# Patient Record
Sex: MALE | Race: WHITE | NOT HISPANIC OR LATINO | Employment: OTHER | URBAN - NONMETROPOLITAN AREA
[De-identification: names, ages, dates, MRNs, and addresses within clinical notes are randomized per-mention and may not be internally consistent; named-entity substitution may affect disease eponyms.]

---

## 2017-01-30 RX ORDER — ESOMEPRAZOLE MAGNESIUM 40 MG/1
CAPSULE, DELAYED RELEASE ORAL
Qty: 90 CAPSULE | Refills: 1 | Status: SHIPPED | OUTPATIENT
Start: 2017-01-30 | End: 2017-06-22 | Stop reason: ALTCHOICE

## 2017-02-13 RX ORDER — FUROSEMIDE 20 MG/1
TABLET ORAL
Qty: 30 TABLET | Refills: 5 | Status: SHIPPED | OUTPATIENT
Start: 2017-02-13 | End: 2017-06-14 | Stop reason: SDUPTHER

## 2017-03-24 ENCOUNTER — APPOINTMENT (OUTPATIENT)
Dept: GENERAL RADIOLOGY | Facility: HOSPITAL | Age: 77
End: 2017-03-24

## 2017-03-24 ENCOUNTER — HOSPITAL ENCOUNTER (EMERGENCY)
Facility: HOSPITAL | Age: 77
Discharge: HOME | End: 2017-03-24
Attending: EMERGENCY MEDICINE | Admitting: EMERGENCY MEDICINE

## 2017-03-24 VITALS
BODY MASS INDEX: 34.84 KG/M2 | OXYGEN SATURATION: 97 % | WEIGHT: 222 LBS | SYSTOLIC BLOOD PRESSURE: 136 MMHG | TEMPERATURE: 97.9 F | DIASTOLIC BLOOD PRESSURE: 77 MMHG | HEART RATE: 56 BPM | HEIGHT: 67 IN | RESPIRATION RATE: 18 BRPM

## 2017-03-24 DIAGNOSIS — R07.9 CHEST PAIN, UNSPECIFIED TYPE: Primary | ICD-10-CM

## 2017-03-24 LAB
ALBUMIN SERPL-MCNC: 3.9 G/DL (ref 3.5–5)
ALBUMIN/GLOB SERPL: 1.2 G/DL (ref 1–2)
ALP SERPL-CCNC: 67 U/L (ref 38–126)
ALT SERPL W P-5'-P-CCNC: 34 U/L (ref 13–69)
ANION GAP SERPL CALCULATED.3IONS-SCNC: 13.3 MMOL/L
AST SERPL-CCNC: 29 U/L (ref 15–46)
BASOPHILS # BLD AUTO: 0.06 10*3/MM3 (ref 0–0.2)
BASOPHILS NFR BLD AUTO: 0.7 % (ref 0–2.5)
BILIRUB SERPL-MCNC: 0.5 MG/DL (ref 0.2–1.3)
BUN BLD-MCNC: 28 MG/DL (ref 7–20)
BUN/CREAT SERPL: 21.5 (ref 6.3–21.9)
CALCIUM SPEC-SCNC: 9.4 MG/DL (ref 8.4–10.2)
CHLORIDE SERPL-SCNC: 105 MMOL/L (ref 98–107)
CO2 SERPL-SCNC: 29 MMOL/L (ref 26–30)
CREAT BLD-MCNC: 1.3 MG/DL (ref 0.6–1.3)
DEPRECATED RDW RBC AUTO: 46 FL (ref 37–54)
EOSINOPHIL # BLD AUTO: 0.28 10*3/MM3 (ref 0–0.7)
EOSINOPHIL NFR BLD AUTO: 3.4 % (ref 0–7)
ERYTHROCYTE [DISTWIDTH] IN BLOOD BY AUTOMATED COUNT: 13.9 % (ref 11.5–14.5)
GFR SERPL CREATININE-BSD FRML MDRD: 54 ML/MIN/1.73
GLOBULIN UR ELPH-MCNC: 3.3 GM/DL
GLUCOSE BLD-MCNC: 149 MG/DL (ref 74–98)
HCT VFR BLD AUTO: 42.7 % (ref 42–52)
HGB BLD-MCNC: 14.7 G/DL (ref 14–18)
HOLD SPECIMEN: NORMAL
HOLD SPECIMEN: NORMAL
IMM GRANULOCYTES # BLD: 0.03 10*3/MM3 (ref 0–0.06)
IMM GRANULOCYTES NFR BLD: 0.4 % (ref 0–0.6)
LYMPHOCYTES # BLD AUTO: 2.74 10*3/MM3 (ref 0.6–3.4)
LYMPHOCYTES NFR BLD AUTO: 33.3 % (ref 10–50)
MCH RBC QN AUTO: 31 PG (ref 27–31)
MCHC RBC AUTO-ENTMCNC: 34.4 G/DL (ref 30–37)
MCV RBC AUTO: 90.1 FL (ref 80–94)
MONOCYTES # BLD AUTO: 0.83 10*3/MM3 (ref 0–0.9)
MONOCYTES NFR BLD AUTO: 10.1 % (ref 0–12)
NEUTROPHILS # BLD AUTO: 4.3 10*3/MM3 (ref 2–6.9)
NEUTROPHILS NFR BLD AUTO: 52.1 % (ref 37–80)
NRBC BLD MANUAL-RTO: 0 /100 WBC (ref 0–0)
PLATELET # BLD AUTO: 140 10*3/MM3 (ref 130–400)
PMV BLD AUTO: 10.1 FL (ref 6–12)
POTASSIUM BLD-SCNC: 3.3 MMOL/L (ref 3.5–5.1)
PROT SERPL-MCNC: 7.2 G/DL (ref 6.3–8.2)
RBC # BLD AUTO: 4.74 10*6/MM3 (ref 4.7–6.1)
SODIUM BLD-SCNC: 144 MMOL/L (ref 137–145)
TROPONIN I SERPL-MCNC: 0.01 NG/ML (ref 0–0.05)
TROPONIN I SERPL-MCNC: <0.012 NG/ML (ref 0–0.03)
TROPONIN I SERPL-MCNC: <0.012 NG/ML (ref 0–0.03)
WBC NRBC COR # BLD: 8.24 10*3/MM3 (ref 4.8–10.8)
WHOLE BLOOD HOLD SPECIMEN: NORMAL
WHOLE BLOOD HOLD SPECIMEN: NORMAL

## 2017-03-24 PROCEDURE — 99284 EMERGENCY DEPT VISIT MOD MDM: CPT

## 2017-03-24 PROCEDURE — 85025 COMPLETE CBC W/AUTO DIFF WBC: CPT | Performed by: EMERGENCY MEDICINE

## 2017-03-24 PROCEDURE — 71010 HC CHEST PA OR AP: CPT

## 2017-03-24 PROCEDURE — 84484 ASSAY OF TROPONIN QUANT: CPT | Performed by: EMERGENCY MEDICINE

## 2017-03-24 PROCEDURE — 93005 ELECTROCARDIOGRAM TRACING: CPT

## 2017-03-24 PROCEDURE — 80053 COMPREHEN METABOLIC PANEL: CPT | Performed by: EMERGENCY MEDICINE

## 2017-03-24 RX ORDER — ASPIRIN 81 MG/1
243 TABLET, CHEWABLE ORAL ONCE
Status: COMPLETED | OUTPATIENT
Start: 2017-03-24 | End: 2017-03-24

## 2017-03-24 RX ORDER — SODIUM CHLORIDE 0.9 % (FLUSH) 0.9 %
10 SYRINGE (ML) INJECTION AS NEEDED
Status: DISCONTINUED | OUTPATIENT
Start: 2017-03-24 | End: 2017-03-24 | Stop reason: HOSPADM

## 2017-03-24 RX ADMIN — ASPIRIN 81 MG 243 MG: 81 TABLET ORAL at 15:49

## 2017-03-24 NOTE — ED PROVIDER NOTES
TRIAGE CHIEF COMPLAINT:   Chief Complaint   Patient presents with   • Chest Pain      HPI: Gabriel Conley is a 76 y.o. male who presents to the emergency department complaining of chest discomfort.  Patient describes aching midsternal chest discomfort that radiated to his left shoulder and hand.  Symptoms began yesterday and it been intermittently present.  Patient denies any discomfort on arrival.  Denies any associated shortness of breath.  No lightheadedness or dizziness.  No nausea or vomiting or diaphoresis.  Patient is a history of coronary artery disease and has had bypass surgery in the past.  Denies any cough or fevers or chills.  No other complaints currently.     REVIEW OF SYSTEMS: Otherwise negative unless stated above     PAST MEDICAL HISTORY:   Past Medical History:   Diagnosis Date   • Acid reflux    • Heart murmur    • Kidney stone    • Melena 9/9/2016        FAMILY HISTORY:   Family History   Problem Relation Age of Onset   • Stroke Other    • Hypertension Other    • Diabetes Other    • Arthritis Other    • Heart attack Other         SOCIAL HISTORY:   Social History     Social History   • Marital status:      Spouse name: N/A   • Number of children: N/A   • Years of education: N/A     Occupational History   • Not on file.     Social History Main Topics   • Smoking status: Never Smoker   • Smokeless tobacco: Not on file   • Alcohol use No   • Drug use: Not on file   • Sexual activity: Not on file     Other Topics Concern   • Not on file     Social History Narrative   • No narrative on file        SURGICAL HISTORY:   Past Surgical History:   Procedure Laterality Date   • CARDIAC SURGERY     • CORONARY ARTERY BYPASS GRAFT       Coronary Artery Triple Arterial Bypass Graft   • HERNIA REPAIR          CURRENT MEDICATIONS:      Medication List      ASK your doctor about these medications          aspirin 81 MG tablet       atorvastatin 20 MG tablet   Commonly known as:  LIPITOR       Coenzyme Q10 100 MG  tablet       * esomeprazole 40 MG packet   Commonly known as:  NexIUM   Take 40 mg by mouth every morning before breakfast.       * esomeprazole 40 MG capsule   Commonly known as:  nexIUM   TAKE 1 CAPSULE EVERY DAY       furosemide 20 MG tablet   Commonly known as:  LASIX   TAKE 1 TABLET DAILY AS NEEDED.       melatonin 5 MG tablet tablet       metoprolol tartrate 50 MG tablet   Commonly known as:  LOPRESSOR       MICARDIS 20 MG tablet   Generic drug:  telmisartan       NITROSTAT 0.4 MG SL tablet   Generic drug:  nitroglycerin       * LUTEIN VISION BLEND PO       * OSTEO BI-FLEX ADV DOUBLE ST PO       potassium chloride 20 MEQ CR tablet   Commonly known as:  K-DUR,KLOR-CON       telmisartan-hydrochlorothiazide 80-25 MG per tablet   Commonly known as:  MICARDIS HCT       * Notice:  This list has 4 medication(s) that are the same as other   medications prescribed for you. Read the directions carefully, and ask   your doctor or other care provider to review them with you.         ALLERGIES: Vicodin [hydrocodone-acetaminophen]     PHYSICAL EXAM:   VITAL SIGNS:   Vitals:    03/24/17 1732   BP: 126/76   Pulse: 57   Resp:    Temp:    SpO2: 94%      CONSTITUTIONAL: Awake, oriented, appears non-toxic   HENT: Atraumatic, normocephalic, oral mucosa pink and moist, airway patent.  EYES: Conjunctiva clear  NECK: Trachea midline, non-tender, supple   CARDIOVASCULAR: Normal heart rate, Normal rhythm, No murmurs, rubs, gallops   PULMONARY/CHEST: Clear to auscultation, no rhonchi, wheezes, or rales. Symmetrical breath sounds.   ABDOMINAL: Non-distended, soft, non-tender - no rebound or guarding.  NEUROLOGIC: Non-focal, moving all four extremities, no gross sensory or motor deficits.   EXTREMITIES: No clubbing, cyanosis, or edema   SKIN: Warm, Dry, No erythema, No rash     ED COURSE / MEDICAL DECISION MAKING:   Gabriel Conley is a 76 y.o. male who presents to the emergency department for evaluation of chest discomfort.  Patient currently  asymptomatic on arrival.  Vital signs stable on arrival.  Physical exam is unremarkable.  EKG on arrival revealed sinus rhythm with a rate of 67 bpm.  There were some nonspecific findings but no acute ischemic changes.  EKG was similar in appearance to previous EKGs.  Laboratory testing was largely unremarkable.  Chest x-ray per my interpretation did not reveal any acute abnormalities.  Patient remained asymptomatic in the emergency department.  Repeat set of cardiac enzymes was obtained and did not reveal any acute abnormalities, within normal limits.  Repeat EKG revealed sinus bradycardia with rate of 57 bpm.  It was similar in appearance to previous EKG.  Patient remained asymptomatic after several hours.  At this point I think is safe for discharged home.  Will refer him back to his cardiologist and have him return should his symptoms worsen in any way.    DECISION TO DISCHARGE/ADMIT: see ED care timeline     FINAL IMPRESSION:   1 -- chest pain   2 --   3 --     Electronically signed by: Apryl Rea MD, 3/24/2017 6:37 PM       Apryl Rea MD  03/24/17 1954

## 2017-03-24 NOTE — DISCHARGE INSTRUCTIONS
"Most recent vital signs: /61  Pulse 59  Temp 98.2 °F (36.8 °C) (Oral)   Resp 18  Ht 66.5\" (168.9 cm)  Wt 222 lb (101 kg)  SpO2 96%  BMI 35.29 kg/m2     Below you fill find any summaries of imaging results and further discharge instructions as discussed during your visit.     XR Chest 1 View    (Results Pending)        --PLEASE READ THESE DIRECTIONS IN FULL--     Our goal is to provide the best care we can AND to find any medical or surgical emergency while you are in the ER. If a medical or surgical emergency was not identified during your visit (or if the issue was resolved during the visit), following these directions for follow-up with a primary care provider and/or specialists and following the return precautions will be the safest and most effective way to protect your health after leaving the emergency room.     Therefore, in addition to the conversation we had in the room, please read all of the information in these discharge instructions, take medications as directed, and follow-up as directed.     You should seek immediate medical help for:     Worsening pain that is not helped with prescribed pain medicines and/or the recommended doses of over the counter pain medicines such as acetaminophen (Tylenol) or ibuprofen (Motrin/Advil)*.   New or worsening chest pain or trouble breathing   New or worsening headache, confusion, weakness, or visual changes   New or worsening fever (>100.4 F) that does not improve with the recommended doses of over the counter fever treatments such as acetaminophen (Tylenol) or ibuprofen (Motrin/Advil)* for more than a few hours.   Any other concerns that you feel could be life, limb, or eye-sight threatening     As a reminder, if you received any medicines or prescriptions for medicines that may be sedating (\"narcotics\", \"opioids\"), do NOT:   - operate any vehicles   - take if you are already tired   - take if you have had or plan to have alcohol   - perform other " responsibilities that require your full attention.     Common medications include, but are not limited to:   Opiates: Morphine, Norco, Lortab, Vicodin, Percocet, oxycodone, hydrocodone, Dilaudid, hydromorphone   Benzodiazepines: Ativan, Valium, alprazolam, lorazepam, diazepam,   Other sedating medications: promethazine, Phenergan, trazodone, Benadryl, hydroxyzine, Vistaril, diphenhydramine, zolpidem, and Ambien     *If you have any history of GI (stomach/intestinal) bleeding, allergic reactions, kidney problems, and/or certain heart problems or there is any chance you could be pregnant, and have been told to avoid NSAIDs (ibuprofen, naproxen, Aleve, Motrin, Advil) please avoid these medications. Please remember that prescribed pain medicines often contain Tylenol/acetaminophen, so make sure your total daily (24 hour) intake does not exceed 4 grams or 4000mg.

## 2017-06-14 ENCOUNTER — TELEPHONE (OUTPATIENT)
Dept: INTERNAL MEDICINE | Facility: CLINIC | Age: 77
End: 2017-06-14

## 2017-06-14 RX ORDER — FUROSEMIDE 20 MG/1
20 TABLET ORAL DAILY
Qty: 30 TABLET | Refills: 0 | Status: SHIPPED | OUTPATIENT
Start: 2017-06-14 | End: 2017-08-28 | Stop reason: SDUPTHER

## 2017-06-14 NOTE — TELEPHONE ENCOUNTER
PT SAID HE WANTS TO DISCONTINUE ESOMEPRAZOLE MAG 40MG. HE SAID PEPTO BISMOL IS HELPING HIS STOMACH PROBLEM.

## 2017-06-29 ENCOUNTER — HOSPITAL ENCOUNTER (EMERGENCY)
Facility: HOSPITAL | Age: 77
Discharge: ANOTHER HOSPITAL | End: 2017-06-30
Attending: EMERGENCY MEDICINE | Admitting: EMERGENCY MEDICINE

## 2017-06-29 ENCOUNTER — APPOINTMENT (OUTPATIENT)
Dept: GENERAL RADIOLOGY | Facility: HOSPITAL | Age: 77
End: 2017-06-29

## 2017-06-29 VITALS
TEMPERATURE: 97.7 F | RESPIRATION RATE: 18 BRPM | OXYGEN SATURATION: 95 % | DIASTOLIC BLOOD PRESSURE: 68 MMHG | WEIGHT: 221 LBS | BODY MASS INDEX: 34.69 KG/M2 | SYSTOLIC BLOOD PRESSURE: 118 MMHG | HEART RATE: 59 BPM | HEIGHT: 67 IN

## 2017-06-29 DIAGNOSIS — I48.0 PAROXYSMAL ATRIAL FIBRILLATION (HCC): ICD-10-CM

## 2017-06-29 DIAGNOSIS — I21.4 ACUTE NON-ST SEGMENT ELEVATION MYOCARDIAL INFARCTION (HCC): Primary | ICD-10-CM

## 2017-06-29 LAB
ALBUMIN SERPL-MCNC: 4.2 G/DL (ref 3.5–5)
ALBUMIN/GLOB SERPL: 1.3 G/DL (ref 1–2)
ALP SERPL-CCNC: 69 U/L (ref 38–126)
ALT SERPL W P-5'-P-CCNC: 34 U/L (ref 13–69)
ANION GAP SERPL CALCULATED.3IONS-SCNC: 16.2 MMOL/L
AST SERPL-CCNC: 25 U/L (ref 15–46)
BASOPHILS # BLD AUTO: 0.04 10*3/MM3 (ref 0–0.2)
BASOPHILS NFR BLD AUTO: 0.3 % (ref 0–2.5)
BILIRUB SERPL-MCNC: 0.6 MG/DL (ref 0.2–1.3)
BUN BLD-MCNC: 28 MG/DL (ref 7–20)
BUN/CREAT SERPL: 21.5 (ref 6.3–21.9)
CALCIUM SPEC-SCNC: 9.8 MG/DL (ref 8.4–10.2)
CHLORIDE SERPL-SCNC: 102 MMOL/L (ref 98–107)
CO2 SERPL-SCNC: 27 MMOL/L (ref 26–30)
CREAT BLD-MCNC: 1.3 MG/DL (ref 0.6–1.3)
DEPRECATED RDW RBC AUTO: 45.1 FL (ref 37–54)
EOSINOPHIL # BLD AUTO: 0.13 10*3/MM3 (ref 0–0.7)
EOSINOPHIL NFR BLD AUTO: 1.1 % (ref 0–7)
ERYTHROCYTE [DISTWIDTH] IN BLOOD BY AUTOMATED COUNT: 14 % (ref 11.5–14.5)
GFR SERPL CREATININE-BSD FRML MDRD: 54 ML/MIN/1.73
GLOBULIN UR ELPH-MCNC: 3.3 GM/DL
GLUCOSE BLD-MCNC: 191 MG/DL (ref 74–98)
HCT VFR BLD AUTO: 43.6 % (ref 42–52)
HGB BLD-MCNC: 15 G/DL (ref 14–18)
HOLD SPECIMEN: NORMAL
IMM GRANULOCYTES # BLD: 0.05 10*3/MM3 (ref 0–0.06)
IMM GRANULOCYTES NFR BLD: 0.4 % (ref 0–0.6)
LYMPHOCYTES # BLD AUTO: 2.85 10*3/MM3 (ref 0.6–3.4)
LYMPHOCYTES NFR BLD AUTO: 24.3 % (ref 10–50)
MCH RBC QN AUTO: 30.5 PG (ref 27–31)
MCHC RBC AUTO-ENTMCNC: 34.4 G/DL (ref 30–37)
MCV RBC AUTO: 88.8 FL (ref 80–94)
MONOCYTES # BLD AUTO: 0.72 10*3/MM3 (ref 0–0.9)
MONOCYTES NFR BLD AUTO: 6.1 % (ref 0–12)
NEUTROPHILS # BLD AUTO: 7.92 10*3/MM3 (ref 2–6.9)
NEUTROPHILS NFR BLD AUTO: 67.8 % (ref 37–80)
NRBC BLD MANUAL-RTO: 0 /100 WBC (ref 0–0)
PLATELET # BLD AUTO: 132 10*3/MM3 (ref 130–400)
PMV BLD AUTO: 10.1 FL (ref 6–12)
POTASSIUM BLD-SCNC: 3.2 MMOL/L (ref 3.5–5.1)
PROT SERPL-MCNC: 7.5 G/DL (ref 6.3–8.2)
RBC # BLD AUTO: 4.91 10*6/MM3 (ref 4.7–6.1)
SODIUM BLD-SCNC: 142 MMOL/L (ref 137–145)
TROPONIN I SERPL-MCNC: 0.01 NG/ML (ref 0–0.05)
TROPONIN I SERPL-MCNC: 0.29 NG/ML (ref 0–0.03)
TROPONIN I SERPL-MCNC: <0.012 NG/ML (ref 0–0.03)
TSH SERPL DL<=0.05 MIU/L-ACNC: 1.89 MIU/ML (ref 0.47–4.68)
WBC NRBC COR # BLD: 11.71 10*3/MM3 (ref 4.8–10.8)
WHOLE BLOOD HOLD SPECIMEN: NORMAL
WHOLE BLOOD HOLD SPECIMEN: NORMAL

## 2017-06-29 PROCEDURE — 71010 HC CHEST PA OR AP: CPT

## 2017-06-29 PROCEDURE — 80053 COMPREHEN METABOLIC PANEL: CPT | Performed by: EMERGENCY MEDICINE

## 2017-06-29 PROCEDURE — 84443 ASSAY THYROID STIM HORMONE: CPT | Performed by: PHYSICIAN ASSISTANT

## 2017-06-29 PROCEDURE — 99284 EMERGENCY DEPT VISIT MOD MDM: CPT

## 2017-06-29 PROCEDURE — 84484 ASSAY OF TROPONIN QUANT: CPT | Performed by: PHYSICIAN ASSISTANT

## 2017-06-29 PROCEDURE — 25010000002 ENOXAPARIN PER 10 MG: Performed by: PHYSICIAN ASSISTANT

## 2017-06-29 PROCEDURE — 93005 ELECTROCARDIOGRAM TRACING: CPT | Performed by: EMERGENCY MEDICINE

## 2017-06-29 PROCEDURE — 96374 THER/PROPH/DIAG INJ IV PUSH: CPT

## 2017-06-29 PROCEDURE — 85025 COMPLETE CBC W/AUTO DIFF WBC: CPT | Performed by: EMERGENCY MEDICINE

## 2017-06-29 PROCEDURE — 96372 THER/PROPH/DIAG INJ SC/IM: CPT

## 2017-06-29 PROCEDURE — 84484 ASSAY OF TROPONIN QUANT: CPT | Performed by: EMERGENCY MEDICINE

## 2017-06-29 RX ORDER — ASPIRIN 81 MG/1
243 TABLET, CHEWABLE ORAL ONCE
Status: COMPLETED | OUTPATIENT
Start: 2017-06-29 | End: 2017-06-29

## 2017-06-29 RX ORDER — POTASSIUM CHLORIDE 750 MG/1
40 CAPSULE, EXTENDED RELEASE ORAL ONCE
Status: COMPLETED | OUTPATIENT
Start: 2017-06-29 | End: 2017-06-29

## 2017-06-29 RX ORDER — DILTIAZEM HYDROCHLORIDE 5 MG/ML
20 INJECTION INTRAVENOUS ONCE
Status: COMPLETED | OUTPATIENT
Start: 2017-06-29 | End: 2017-06-29

## 2017-06-29 RX ORDER — SODIUM CHLORIDE 0.9 % (FLUSH) 0.9 %
10 SYRINGE (ML) INJECTION AS NEEDED
Status: DISCONTINUED | OUTPATIENT
Start: 2017-06-29 | End: 2017-06-30 | Stop reason: HOSPADM

## 2017-06-29 RX ORDER — ASPIRIN 325 MG
325 TABLET ORAL ONCE
Status: DISCONTINUED | OUTPATIENT
Start: 2017-06-29 | End: 2017-06-30 | Stop reason: HOSPADM

## 2017-06-29 RX ADMIN — DILTIAZEM HYDROCHLORIDE 20 MG: 5 INJECTION INTRAVENOUS at 18:01

## 2017-06-29 RX ADMIN — ENOXAPARIN SODIUM 100 MG: 100 INJECTION SUBCUTANEOUS at 23:40

## 2017-06-29 RX ADMIN — ASPIRIN 81 MG 243 MG: 81 TABLET ORAL at 21:25

## 2017-06-29 RX ADMIN — POTASSIUM CHLORIDE 40 MEQ: 750 CAPSULE, EXTENDED RELEASE ORAL at 21:25

## 2017-08-28 RX ORDER — FUROSEMIDE 20 MG/1
TABLET ORAL
Qty: 30 TABLET | Refills: 11 | Status: SHIPPED | OUTPATIENT
Start: 2017-08-28 | End: 2019-12-11 | Stop reason: SDUPTHER

## 2018-02-05 ENCOUNTER — OFFICE VISIT (OUTPATIENT)
Dept: INTERNAL MEDICINE | Facility: CLINIC | Age: 78
End: 2018-02-05

## 2018-02-05 VITALS
RESPIRATION RATE: 16 BRPM | HEIGHT: 67 IN | SYSTOLIC BLOOD PRESSURE: 122 MMHG | HEART RATE: 56 BPM | OXYGEN SATURATION: 97 % | TEMPERATURE: 98.3 F | DIASTOLIC BLOOD PRESSURE: 70 MMHG | BODY MASS INDEX: 37.67 KG/M2 | WEIGHT: 240 LBS

## 2018-02-05 DIAGNOSIS — Z12.5 ENCOUNTER FOR SCREENING FOR MALIGNANT NEOPLASM OF PROSTATE: ICD-10-CM

## 2018-02-05 DIAGNOSIS — I25.810 CORONARY ARTERY DISEASE INVOLVING CORONARY BYPASS GRAFT OF NATIVE HEART WITHOUT ANGINA PECTORIS: ICD-10-CM

## 2018-02-05 DIAGNOSIS — Z12.11 SCREEN FOR COLON CANCER: ICD-10-CM

## 2018-02-05 DIAGNOSIS — I10 ESSENTIAL HYPERTENSION: Primary | ICD-10-CM

## 2018-02-05 DIAGNOSIS — I48.20 CHRONIC ATRIAL FIBRILLATION (HCC): ICD-10-CM

## 2018-02-05 DIAGNOSIS — G47.33 OSA (OBSTRUCTIVE SLEEP APNEA): ICD-10-CM

## 2018-02-05 DIAGNOSIS — L82.1 SK (SEBORRHEIC KERATOSIS): ICD-10-CM

## 2018-02-05 DIAGNOSIS — E55.9 VITAMIN D DEFICIENCY: ICD-10-CM

## 2018-02-05 DIAGNOSIS — R97.20 ELEVATED PSA: ICD-10-CM

## 2018-02-05 PROCEDURE — 99214 OFFICE O/P EST MOD 30 MIN: CPT | Performed by: INTERNAL MEDICINE

## 2018-02-05 NOTE — PROGRESS NOTES
Subjective     Patient ID: Gabriel Conley is a 77 y.o. male. Patient is here for management of multiple medical problems.     Chief Complaint   Patient presents with   • Hypertension     Initial visit to establish care   • Nevus     patient would like to have several moles on his face and back to be checked     History of Present Illness     Pcp Dr Lima.    Increased development of skin lesion.  Face and back.  Pt was to get colonoscopy 3 years ago. Had a stool study years before that.    CABG 13 yrs ago. 4v.  Stents after that 12 yrs ago.  Followed by Dr Kelly.    Pt with a fib.      RORY not treated.  Had cpap in the past and didn't tolerate.          The following portions of the patient's history were reviewed and updated as appropriate: allergies, current medications, past family history, past medical history, past social history, past surgical history and problem list.    Review of Systems   Constitutional: Negative for diaphoresis and fatigue.   Gastrointestinal: Negative for abdominal pain and diarrhea.   Genitourinary: Negative for difficulty urinating.   Musculoskeletal: Negative for arthralgias and back pain.   Psychiatric/Behavioral: Positive for sleep disturbance.   All other systems reviewed and are negative.      Current Outpatient Prescriptions:   •  aspirin 81 MG tablet, Take  by mouth., Disp: , Rfl:   •  atorvastatin (LIPITOR) 20 MG tablet, Take 40 mg by mouth Every Night., Disp: , Rfl:   •  Coenzyme Q10 100 MG tablet, Take  by mouth., Disp: , Rfl:   •  furosemide (LASIX) 20 MG tablet, TAKE 1 TABLET BY MOUTH DAILY. NEEDS APPOINTMENT FOR ADDITIONAL REFILLS (Patient taking differently: Patient taking 3 times a week.), Disp: 30 tablet, Rfl: 11  •  melatonin 5 MG tablet tablet, Take 5 mg by mouth., Disp: , Rfl:   •  metoprolol tartrate (LOPRESSOR) 50 MG tablet, Take  by mouth 2 (two) times a day., Disp: , Rfl:   •  Misc Natural Products (LUTEIN VISION BLEND PO), Take  by mouth., Disp: , Rfl:   •  Misc  "Natural Products (OSTEO BI-FLEX ADV DOUBLE ST PO), Take  by mouth., Disp: , Rfl:   •  potassium chloride (K-DUR,KLOR-CON) 20 MEQ CR tablet, Take  by mouth daily., Disp: , Rfl:   •  rivaroxaban (XARELTO) 15 MG tablet, Take 15 mg by mouth Daily., Disp: , Rfl:   •  telmisartan-hydrochlorothiazide (MICARDIS HCT) 80-25 MG per tablet, Take  by mouth daily., Disp: , Rfl:   •  nitroglycerin (NITROSTAT) 0.4 MG SL tablet, Place  under the tongue., Disp: , Rfl:     Objective      Blood pressure 122/70, pulse 56, temperature 98.3 °F (36.8 °C), temperature source Oral, resp. rate 16, height 168.9 cm (66.5\"), weight 109 kg (240 lb), SpO2 97 %.    Physical Exam     General Appearance:    Alert, cooperative, no distress, appears stated age   Head:    Normocephalic, without obvious abnormality, atraumatic   Eyes:    PERRL, conjunctiva/corneas clear, EOM's intact   Ears:    Normal TM's and external ear canals, both ears   Nose:   Nares normal, septum midline, mucosa normal, no drainage   or sinus tenderness   Throat:   Narrowed oral airway.  Lips, mucosa, and tongue normal; teeth and gums normal   Neck:   Thick neck, Supple, symmetrical, trachea midline, no adenopathy;        thyroid:  No enlargement/tenderness/nodules; no carotid    bruit or JVD   Back:     Symmetric, no curvature, ROM normal, no CVA tenderness   Lungs:     Clear to auscultation bilaterally, respirations unlabored   Chest wall:    No tenderness or deformity   Heart:    Regular rate and rhythm, S1 and S2 normal, no murmur,        rub or gallop   Abdomen:     Soft, non-tender, bowel sounds active all four quadrants,     no masses, no organomegaly   Extremities:   Extremities normal, atraumatic, no cyanosis +2 edema   Pulses:   2+ and symmetric  extremities   Skin:  skin lesion sk on back.      Lymph nodes:   Cervical, supraclavicular, and axillary nodes normal   Neurologic:   CNII-XII intact. Normal strength, sensation and reflexes       throughout      Results for " orders placed or performed during the hospital encounter of 06/29/17   Comprehensive Metabolic Panel   Result Value Ref Range    Glucose 191 (H) 74 - 98 mg/dL    BUN 28 (H) 7 - 20 mg/dL    Creatinine 1.30 0.60 - 1.30 mg/dL    Sodium 142 137 - 145 mmol/L    Potassium 3.2 (L) 3.5 - 5.1 mmol/L    Chloride 102 98 - 107 mmol/L    CO2 27.0 26.0 - 30.0 mmol/L    Calcium 9.8 8.4 - 10.2 mg/dL    Total Protein 7.5 6.3 - 8.2 g/dL    Albumin 4.20 3.50 - 5.00 g/dL    ALT (SGPT) 34 13 - 69 U/L    AST (SGOT) 25 15 - 46 U/L    Alkaline Phosphatase 69 38 - 126 U/L    Total Bilirubin 0.6 0.2 - 1.3 mg/dL    eGFR Non African Amer 54 (L) >60 mL/min/1.73    Globulin 3.3 gm/dL    A/G Ratio 1.3 1.0 - 2.0 g/dL    BUN/Creatinine Ratio 21.5 6.3 - 21.9    Anion Gap 16.2 mmol/L   Troponin I-Stat   Result Value Ref Range    Troponin I 0.010 0.000 - 0.050 ng/mL   Troponin   Result Value Ref Range    Troponin I <0.012 0.000 - 0.034 ng/mL   CBC Auto Differential   Result Value Ref Range    WBC 11.71 (H) 4.80 - 10.80 10*3/mm3    RBC 4.91 4.70 - 6.10 10*6/mm3    Hemoglobin 15.0 14.0 - 18.0 g/dL    Hematocrit 43.6 42.0 - 52.0 %    MCV 88.8 80.0 - 94.0 fL    MCH 30.5 27.0 - 31.0 pg    MCHC 34.4 30.0 - 37.0 g/dL    RDW 14.0 11.5 - 14.5 %    RDW-SD 45.1 37.0 - 54.0 fl    MPV 10.1 6.0 - 12.0 fL    Platelets 132 130 - 400 10*3/mm3    Neutrophil % 67.8 37.0 - 80.0 %    Lymphocyte % 24.3 10.0 - 50.0 %    Monocyte % 6.1 0.0 - 12.0 %    Eosinophil % 1.1 0.0 - 7.0 %    Basophil % 0.3 0.0 - 2.5 %    Immature Grans % 0.4 0.0 - 0.6 %    Neutrophils, Absolute 7.92 (H) 2.00 - 6.90 10*3/mm3    Lymphocytes, Absolute 2.85 0.60 - 3.40 10*3/mm3    Monocytes, Absolute 0.72 0.00 - 0.90 10*3/mm3    Eosinophils, Absolute 0.13 0.00 - 0.70 10*3/mm3    Basophils, Absolute 0.04 0.00 - 0.20 10*3/mm3    Immature Grans, Absolute 0.05 0.00 - 0.06 10*3/mm3    nRBC 0.0 0.0 - 0.0 /100 WBC   TSH   Result Value Ref Range    TSH 1.890 0.470 - 4.680 mIU/mL   Troponin   Result Value Ref  Range    Troponin I 0.289 (C) 0.000 - 0.034 ng/mL   Light Blue Top   Result Value Ref Range    Extra Tube hold for add-on    Lavender Top   Result Value Ref Range    Extra Tube hold for add-on    Gold Top - SST   Result Value Ref Range    Extra Tube Hold for add-ons.          Assessment/Plan       Gabriel was seen today for hypertension and nevus.    Diagnoses and all orders for this visit:    Essential hypertension  -     Ambulatory Referral to Neurology  -     TSH  -     T4, Free  -     Vitamin B12  -     CBC & Differential  -     Comprehensive Metabolic Panel  -     Lipid Panel  -     Occult Blood, Fecal By Immunoassay - Stool, Per Rectum    Coronary artery disease involving coronary bypass graft of native heart without angina pectoris  -     Ambulatory Referral to Neurology  -     TSH  -     T4, Free  -     Vitamin B12  -     CBC & Differential  -     Comprehensive Metabolic Panel  -     Lipid Panel  -     Occult Blood, Fecal By Immunoassay - Stool, Per Rectum    Vitamin D deficiency  -     Ambulatory Referral to Neurology  -     TSH  -     T4, Free  -     Vitamin B12  -     CBC & Differential  -     Comprehensive Metabolic Panel  -     Lipid Panel  -     Occult Blood, Fecal By Immunoassay - Stool, Per Rectum    Chronic atrial fibrillation  -     Ambulatory Referral to Neurology  -     TSH  -     T4, Free  -     Vitamin B12  -     CBC & Differential  -     Comprehensive Metabolic Panel  -     Lipid Panel  -     Occult Blood, Fecal By Immunoassay - Stool, Per Rectum    RORY (obstructive sleep apnea)  -     Ambulatory Referral to Neurology  -     TSH  -     T4, Free  -     Vitamin B12  -     CBC & Differential  -     Comprehensive Metabolic Panel  -     Lipid Panel  -     Occult Blood, Fecal By Immunoassay - Stool, Per Rectum    Elevated PSA  -     PSA Screen    Encounter for screening for malignant neoplasm of prostate   -     PSA Screen    SK (seborrheic keratosis)    Screen for colon cancer  -     Occult Blood,  Fecal By Immunoassay - Stool, Per Rectum      Return in about 6 weeks (around 3/19/2018).          There are no Patient Instructions on file for this visit.     Frank Torrez MD    Assessment/Plan

## 2018-02-11 LAB — HEMOCCULT STL QL IA: POSITIVE

## 2018-02-12 DIAGNOSIS — R19.5 HEME POSITIVE STOOL: Primary | ICD-10-CM

## 2018-02-20 ENCOUNTER — OFFICE VISIT (OUTPATIENT)
Dept: SURGERY | Facility: CLINIC | Age: 78
End: 2018-02-20

## 2018-02-20 VITALS
DIASTOLIC BLOOD PRESSURE: 78 MMHG | WEIGHT: 236 LBS | BODY MASS INDEX: 37.04 KG/M2 | SYSTOLIC BLOOD PRESSURE: 122 MMHG | HEIGHT: 67 IN | HEART RATE: 60 BPM | OXYGEN SATURATION: 98 % | TEMPERATURE: 98.2 F | RESPIRATION RATE: 18 BRPM

## 2018-02-20 DIAGNOSIS — Z12.11 SCREENING FOR COLON CANCER: ICD-10-CM

## 2018-02-20 DIAGNOSIS — R19.5 GUAIAC POSITIVE STOOLS: Primary | ICD-10-CM

## 2018-02-20 DIAGNOSIS — K59.04 CHRONIC IDIOPATHIC CONSTIPATION: ICD-10-CM

## 2018-02-20 PROCEDURE — 99203 OFFICE O/P NEW LOW 30 MIN: CPT | Performed by: SURGERY

## 2018-02-20 RX ORDER — FENOFIBRATE 145 MG/1
TABLET, COATED ORAL
COMMUNITY
Start: 2017-12-19 | End: 2019-01-28

## 2018-02-20 RX ORDER — ATORVASTATIN CALCIUM 20 MG/1
TABLET, FILM COATED ORAL
COMMUNITY
Start: 2017-12-19 | End: 2019-01-28 | Stop reason: SDUPTHER

## 2018-02-20 NOTE — PROGRESS NOTES
Patient: Gabriel Conley    YOB: 1940    Date: 02/20/2018    Primary Care Provider: Frank Torrez MD    Chief complaint:   Chief Complaint   Patient presents with   • Colonoscopy     evaluation.       Subjective .     History of present illness:   I saw the patient in the office today as a consultation for evaluation for colonoscopy, he had a recent positive hemocult stool test. Patient had an episode of bleeding after a constipation episode and had the stool guaiac shortly thereafter.  He notices no black or bloody bowel movements otherwise.  He does have relative constipation but he takes fiber therapy for this.    The following portions of the patient's history were reviewed and updated as appropriate: allergies, current medications, past family history, past medical history, past social history, past surgical history and problem list.      Review of Systems   Constitutional: Negative for chills, fever and unexpected weight change.   HENT: Negative for trouble swallowing and voice change.    Eyes: Negative for visual disturbance.   Respiratory: Negative for apnea, cough, chest tightness, shortness of breath and wheezing.    Cardiovascular: Negative for chest pain, palpitations and leg swelling.   Gastrointestinal: Positive for anal bleeding and constipation. Negative for abdominal distention, abdominal pain, blood in stool, diarrhea, nausea, rectal pain and vomiting.   Endocrine: Negative for cold intolerance and heat intolerance.   Genitourinary: Negative for difficulty urinating, dysuria, flank pain, scrotal swelling and testicular pain.   Musculoskeletal: Negative for back pain, gait problem and joint swelling.   Skin: Negative for color change, rash and wound.   Neurological: Negative for dizziness, syncope, speech difficulty, weakness, numbness and headaches.   Hematological: Negative for adenopathy. Does not bruise/bleed easily.   Psychiatric/Behavioral: Negative for confusion. The patient is  not nervous/anxious.        History:  Past Medical History:   Diagnosis Date   • Acid reflux    • Atrial fibrillation    • Cataract    • Coronary artery disease    • Heart murmur    • Kidney stone    • Macular degeneration    • Melena 9/9/2016       Past Surgical History:   Procedure Laterality Date   • CARDIAC SURGERY     • CATARACT EXTRACTION     • CORONARY ARTERY BYPASS GRAFT       Coronary Artery Triple Arterial Bypass Graft   • HERNIA REPAIR         Family History   Problem Relation Age of Onset   • Stroke Other    • Hypertension Other    • Diabetes Other    • Arthritis Other    • Heart attack Other    • Hypertension Mother    • Cancer Mother        Social History   Substance Use Topics   • Smoking status: Never Smoker   • Smokeless tobacco: Never Used   • Alcohol use No       Allergies:  Allergies   Allergen Reactions   • Vicodin [Hydrocodone-Acetaminophen] Nausea And Vomiting       Medications:    Current Outpatient Prescriptions:   •  aspirin 81 MG tablet, Take  by mouth., Disp: , Rfl:   •  atorvastatin (LIPITOR) 20 MG tablet, one by mouth daily, Disp: , Rfl:   •  Coenzyme Q10 100 MG tablet, Take  by mouth., Disp: , Rfl:   •  fenofibrate (TRICOR) 145 MG tablet, one by mouth daily, Disp: , Rfl:   •  furosemide (LASIX) 20 MG tablet, TAKE 1 TABLET BY MOUTH DAILY. NEEDS APPOINTMENT FOR ADDITIONAL REFILLS (Patient taking differently: Patient taking 3 times a week.), Disp: 30 tablet, Rfl: 11  •  Misc Natural Products (OSTEO BI-FLEX ADV DOUBLE ST PO), Take  by mouth., Disp: , Rfl:   •  nitroglycerin (NITROSTAT) 0.4 MG SL tablet, Place  under the tongue., Disp: , Rfl:   •  potassium chloride (K-DUR,KLOR-CON) 20 MEQ CR tablet, Take  by mouth daily., Disp: , Rfl:   •  rivaroxaban (XARELTO) 15 MG tablet, Take 15 mg by mouth Daily., Disp: , Rfl:   •  telmisartan-hydrochlorothiazide (MICARDIS HCT) 80-25 MG per tablet, Take  by mouth daily., Disp: , Rfl:   •  melatonin 5 MG tablet tablet, Take 5 mg by mouth., Disp: , Rfl:    •  metoprolol tartrate (LOPRESSOR) 50 MG tablet, Take  by mouth 2 (two) times a day., Disp: , Rfl:     Objective     Vital Signs:   Temp:  [98.2 °F (36.8 °C)] 98.2 °F (36.8 °C)  Heart Rate:  [60] 60  Resp:  [18] 18  BP: (122)/(78) 122/78    Physical Exam:   General Appearance:    Alert, cooperative, in no acute distress   Head:    Normocephalic, without obvious abnormality, atraumatic   Eyes:            Lids and lashes normal, conjunctivae and sclerae normal, no   icterus, no pallor, corneas clear, PERRL   Ears:    Ears appear intact with no abnormalities noted   Lungs:     Clear to auscultation,respirations regular, even and                  unlabored    Heart:    Regular rhythm and normal rate, normal S1 and S2. does not appear to be in atrial fibrillation currently    Abdomen:     no masses, no organomegaly, soft non-tender, non-distended, no guarding, there is no evidence of tenderness   Extremities:   Moves all extremities well, no edema, no cyanosis, no             redness   Skin:   No bleeding, bruising or rash   Neurologic:   Cranial nerves 2 - 12 grossly intact, sensation intact   Psychiatric: No evidence of depression or anxiety.       Results Review:   None    Review of Systems was reviewed and confirmed as accurate today.    Assessment/Plan :    1. Guaiac positive stools :Likely related to hemorrhoidal bleeding after constipation.  Otherwise having no issues.     2. Screening for colon cancer    3. Chronic idiopathic constipation        I recommend a colonoscopy for further evaluation. The procedure was explained as well as the risks which include but are not limited to bleeding, infection, perforation, abdominal pain etc. The patient understands these risks and the procedure and wishes to proceed.     Electronically signed by Ifeanyi Conley MD  02/20/18 9:41 AM      Scribed for Ifeanyi Conley MD by Daly Kelley. 2/20/2018  10:13 AM

## 2018-02-21 PROBLEM — Z12.11 SCREENING FOR COLON CANCER: Status: ACTIVE | Noted: 2018-02-21

## 2018-03-02 ENCOUNTER — APPOINTMENT (OUTPATIENT)
Dept: PREADMISSION TESTING | Facility: HOSPITAL | Age: 78
End: 2018-03-02

## 2018-03-02 VITALS — BODY MASS INDEX: 36.1 KG/M2 | WEIGHT: 230 LBS | HEIGHT: 67 IN

## 2018-03-02 RX ORDER — MELATONIN
1000 DAILY
COMMUNITY
End: 2019-01-28

## 2018-03-08 ENCOUNTER — APPOINTMENT (OUTPATIENT)
Dept: LAB | Facility: HOSPITAL | Age: 78
End: 2018-03-08

## 2018-03-08 ENCOUNTER — TRANSCRIBE ORDERS (OUTPATIENT)
Dept: ADMINISTRATIVE | Facility: HOSPITAL | Age: 78
End: 2018-03-08

## 2018-03-08 DIAGNOSIS — E87.6 HYPOKALEMIA: Primary | ICD-10-CM

## 2018-03-08 LAB
ALBUMIN SERPL-MCNC: 4.3 G/DL (ref 3.5–5)
ALBUMIN/GLOB SERPL: 1.3 G/DL (ref 1–2)
ALP SERPL-CCNC: 62 U/L (ref 38–126)
ALT SERPL W P-5'-P-CCNC: 43 U/L (ref 13–69)
ANION GAP SERPL CALCULATED.3IONS-SCNC: 16.8 MMOL/L
AST SERPL-CCNC: 28 U/L (ref 15–46)
BACTERIA UR QL AUTO: ABNORMAL /HPF
BASOPHILS # BLD AUTO: 0.05 10*3/MM3 (ref 0–0.2)
BASOPHILS NFR BLD AUTO: 0.6 % (ref 0–2.5)
BILIRUB SERPL-MCNC: 0.4 MG/DL (ref 0.2–1.3)
BILIRUB UR QL STRIP: NEGATIVE
BUN BLD-MCNC: 24 MG/DL (ref 7–20)
BUN/CREAT SERPL: 20 (ref 6.3–21.9)
CALCIUM SPEC-SCNC: 10 MG/DL (ref 8.4–10.2)
CHLORIDE SERPL-SCNC: 104 MMOL/L (ref 98–107)
CHOLEST SERPL-MCNC: 169 MG/DL (ref 0–199)
CLARITY UR: CLEAR
CO2 SERPL-SCNC: 29 MMOL/L (ref 26–30)
COLOR UR: YELLOW
CREAT BLD-MCNC: 1.2 MG/DL (ref 0.6–1.3)
DEPRECATED RDW RBC AUTO: 45.7 FL (ref 37–54)
EOSINOPHIL # BLD AUTO: 0.28 10*3/MM3 (ref 0–0.7)
EOSINOPHIL NFR BLD AUTO: 3.5 % (ref 0–7)
ERYTHROCYTE [DISTWIDTH] IN BLOOD BY AUTOMATED COUNT: 13.8 % (ref 11.5–14.5)
GFR SERPL CREATININE-BSD FRML MDRD: 59 ML/MIN/1.73
GLOBULIN UR ELPH-MCNC: 3.2 GM/DL
GLUCOSE BLD-MCNC: 59 MG/DL (ref 74–98)
GLUCOSE UR STRIP-MCNC: NEGATIVE MG/DL
HCT VFR BLD AUTO: 44.1 % (ref 42–52)
HDLC SERPL-MCNC: 29 MG/DL (ref 40–60)
HGB BLD-MCNC: 15.2 G/DL (ref 14–18)
HGB UR QL STRIP.AUTO: ABNORMAL
HYALINE CASTS UR QL AUTO: ABNORMAL /LPF
IMM GRANULOCYTES # BLD: 0.03 10*3/MM3 (ref 0–0.06)
IMM GRANULOCYTES NFR BLD: 0.4 % (ref 0–0.6)
KETONES UR QL STRIP: NEGATIVE
LDLC SERPL CALC-MCNC: 74 MG/DL (ref 0–99)
LDLC/HDLC SERPL: 2.56 {RATIO}
LEUKOCYTE ESTERASE UR QL STRIP.AUTO: NEGATIVE
LYMPHOCYTES # BLD AUTO: 3.01 10*3/MM3 (ref 0.6–3.4)
LYMPHOCYTES NFR BLD AUTO: 37.2 % (ref 10–50)
MCH RBC QN AUTO: 31.1 PG (ref 27–31)
MCHC RBC AUTO-ENTMCNC: 34.5 G/DL (ref 30–37)
MCV RBC AUTO: 90.4 FL (ref 80–94)
MONOCYTES # BLD AUTO: 0.97 10*3/MM3 (ref 0–0.9)
MONOCYTES NFR BLD AUTO: 12 % (ref 0–12)
NEUTROPHILS # BLD AUTO: 3.75 10*3/MM3 (ref 2–6.9)
NEUTROPHILS NFR BLD AUTO: 46.3 % (ref 37–80)
NITRITE UR QL STRIP: NEGATIVE
NRBC BLD MANUAL-RTO: 0 /100 WBC (ref 0–0)
PH UR STRIP.AUTO: <=5 [PH] (ref 5–8)
PLATELET # BLD AUTO: 139 10*3/MM3 (ref 130–400)
PMV BLD AUTO: 10.2 FL (ref 6–12)
POTASSIUM BLD-SCNC: 3.8 MMOL/L (ref 3.5–5.1)
PROT SERPL-MCNC: 7.5 G/DL (ref 6.3–8.2)
PROT UR QL STRIP: NEGATIVE
PSA SERPL-MCNC: 5.03 NG/ML (ref 0.06–4)
RBC # BLD AUTO: 4.88 10*6/MM3 (ref 4.7–6.1)
RBC # UR: ABNORMAL /HPF
REF LAB TEST METHOD: ABNORMAL
SODIUM BLD-SCNC: 146 MMOL/L (ref 137–145)
SP GR UR STRIP: 1.02 (ref 1–1.03)
SQUAMOUS #/AREA URNS HPF: ABNORMAL /HPF
T4 FREE SERPL-MCNC: 1.07 NG/DL (ref 0.78–2.19)
TRIGL SERPL-MCNC: 329 MG/DL
TSH SERPL DL<=0.05 MIU/L-ACNC: 3.88 MIU/ML (ref 0.47–4.68)
UROBILINOGEN UR QL STRIP: ABNORMAL
VIT B12 BLD-MCNC: 393 PG/ML (ref 239–931)
VLDLC SERPL-MCNC: 65.8 MG/DL
WBC NRBC COR # BLD: 8.09 10*3/MM3 (ref 4.8–10.8)
WBC UR QL AUTO: ABNORMAL /HPF

## 2018-03-08 PROCEDURE — 81001 URINALYSIS AUTO W/SCOPE: CPT | Performed by: INTERNAL MEDICINE

## 2018-03-08 PROCEDURE — 84443 ASSAY THYROID STIM HORMONE: CPT | Performed by: INTERNAL MEDICINE

## 2018-03-08 PROCEDURE — 85025 COMPLETE CBC W/AUTO DIFF WBC: CPT | Performed by: INTERNAL MEDICINE

## 2018-03-08 PROCEDURE — G0103 PSA SCREENING: HCPCS | Performed by: INTERNAL MEDICINE

## 2018-03-08 PROCEDURE — 84439 ASSAY OF FREE THYROXINE: CPT | Performed by: INTERNAL MEDICINE

## 2018-03-08 PROCEDURE — 82607 VITAMIN B-12: CPT | Performed by: INTERNAL MEDICINE

## 2018-03-08 PROCEDURE — 36415 COLL VENOUS BLD VENIPUNCTURE: CPT | Performed by: INTERNAL MEDICINE

## 2018-03-08 PROCEDURE — 80061 LIPID PANEL: CPT | Performed by: INTERNAL MEDICINE

## 2018-03-08 PROCEDURE — 80053 COMPREHEN METABOLIC PANEL: CPT | Performed by: INTERNAL MEDICINE

## 2018-03-13 ENCOUNTER — HOSPITAL ENCOUNTER (OUTPATIENT)
Facility: HOSPITAL | Age: 78
Setting detail: HOSPITAL OUTPATIENT SURGERY
Discharge: HOME | End: 2018-03-13
Attending: SURGERY | Admitting: SURGERY

## 2018-03-13 ENCOUNTER — ANESTHESIA (OUTPATIENT)
Dept: GASTROENTEROLOGY | Facility: HOSPITAL | Age: 78
End: 2018-03-13

## 2018-03-13 ENCOUNTER — ANESTHESIA EVENT (OUTPATIENT)
Dept: GASTROENTEROLOGY | Facility: HOSPITAL | Age: 78
End: 2018-03-13

## 2018-03-13 ENCOUNTER — TRANSCRIBE ORDERS (OUTPATIENT)
Dept: INTERVENTIONAL RADIOLOGY/VASCULAR | Facility: HOSPITAL | Age: 78
End: 2018-03-13

## 2018-03-13 VITALS
HEART RATE: 66 BPM | DIASTOLIC BLOOD PRESSURE: 82 MMHG | OXYGEN SATURATION: 96 % | RESPIRATION RATE: 16 BRPM | TEMPERATURE: 99.2 F | BODY MASS INDEX: 36.96 KG/M2 | SYSTOLIC BLOOD PRESSURE: 150 MMHG | WEIGHT: 230 LBS | HEIGHT: 66 IN

## 2018-03-13 DIAGNOSIS — Z12.11 SCREENING FOR COLON CANCER: ICD-10-CM

## 2018-03-13 DIAGNOSIS — R31.29 MICROSCOPIC HEMATURIA: Primary | ICD-10-CM

## 2018-03-13 PROCEDURE — 25010000002 PROPOFOL 10 MG/ML EMULSION: Performed by: NURSE ANESTHETIST, CERTIFIED REGISTERED

## 2018-03-13 PROCEDURE — 88305 TISSUE EXAM BY PATHOLOGIST: CPT | Performed by: SURGERY

## 2018-03-13 RX ORDER — PROPOFOL 10 MG/ML
VIAL (ML) INTRAVENOUS AS NEEDED
Status: DISCONTINUED | OUTPATIENT
Start: 2018-03-13 | End: 2018-03-13 | Stop reason: SURG

## 2018-03-13 RX ORDER — LIDOCAINE HYDROCHLORIDE 20 MG/ML
INJECTION, SOLUTION INFILTRATION; PERINEURAL AS NEEDED
Status: DISCONTINUED | OUTPATIENT
Start: 2018-03-13 | End: 2018-03-13 | Stop reason: SURG

## 2018-03-13 RX ORDER — ONDANSETRON 2 MG/ML
4 INJECTION INTRAMUSCULAR; INTRAVENOUS ONCE AS NEEDED
Status: DISCONTINUED | OUTPATIENT
Start: 2018-03-13 | End: 2018-03-13 | Stop reason: HOSPADM

## 2018-03-13 RX ORDER — ONDANSETRON 2 MG/ML
4 INJECTION INTRAMUSCULAR; INTRAVENOUS ONCE AS NEEDED
Status: CANCELLED | OUTPATIENT
Start: 2018-03-13 | End: 2018-03-13

## 2018-03-13 RX ORDER — SODIUM CHLORIDE 0.9 % (FLUSH) 0.9 %
3 SYRINGE (ML) INJECTION AS NEEDED
Status: DISCONTINUED | OUTPATIENT
Start: 2018-03-13 | End: 2018-03-13 | Stop reason: HOSPADM

## 2018-03-13 RX ORDER — SODIUM CHLORIDE, SODIUM LACTATE, POTASSIUM CHLORIDE, CALCIUM CHLORIDE 600; 310; 30; 20 MG/100ML; MG/100ML; MG/100ML; MG/100ML
1000 INJECTION, SOLUTION INTRAVENOUS CONTINUOUS PRN
Status: DISCONTINUED | OUTPATIENT
Start: 2018-03-13 | End: 2018-03-13 | Stop reason: HOSPADM

## 2018-03-13 RX ADMIN — LIDOCAINE HYDROCHLORIDE 100 MG: 20 INJECTION, SOLUTION INFILTRATION; PERINEURAL at 12:51

## 2018-03-13 RX ADMIN — PROPOFOL 350 MG: 10 INJECTION, EMULSION INTRAVENOUS at 13:24

## 2018-03-13 RX ADMIN — SODIUM CHLORIDE, POTASSIUM CHLORIDE, SODIUM LACTATE AND CALCIUM CHLORIDE: 600; 310; 30; 20 INJECTION, SOLUTION INTRAVENOUS at 12:49

## 2018-03-13 RX ADMIN — SODIUM CHLORIDE, POTASSIUM CHLORIDE, SODIUM LACTATE AND CALCIUM CHLORIDE 1000 ML: 600; 310; 30; 20 INJECTION, SOLUTION INTRAVENOUS at 10:35

## 2018-03-13 RX ADMIN — SODIUM CHLORIDE, POTASSIUM CHLORIDE, SODIUM LACTATE AND CALCIUM CHLORIDE: 600; 310; 30; 20 INJECTION, SOLUTION INTRAVENOUS at 13:24

## 2018-03-13 NOTE — ANESTHESIA PREPROCEDURE EVALUATION
Anesthesia Evaluation     Patient summary reviewed and Nursing notes reviewed   no history of anesthetic complications:  NPO Solid Status: > 8 hours  NPO Liquid Status: > 8 hours           Airway   Mallampati: II  TM distance: >3 FB  Neck ROM: full  no difficulty expected  Dental - normal exam     Pulmonary - normal exam   (+) sleep apnea,   Cardiovascular - normal exam    Rhythm: regular  Rate: normal    (+) hypertension well controlled, valvular problems/murmurs murmur, CAD, CABG > 6 Months, cardiac stents more than 12 months ago dysrhythmias Atrial Fib, angina,       Neuro/Psych- negative ROS  GI/Hepatic/Renal/Endo    (+)  hiatal hernia, GERD well controlled,      Musculoskeletal     Abdominal    Substance History - negative use     OB/GYN negative ob/gyn ROS         Other   (+) arthritis                     Anesthesia Plan    ASA 3     MAC   (Pt told that intravenous sedation will be used as the primary anesthetic along with local anesthesia if necessary. Every effort will be made to make sure the patient is comfortable.     The patient was told they may or may not have recall for the procedure. It was further explained that if the MAC was not adequate that a general anesthetic with either an LMA or endotracheal tube would be required.     Will proceed with the plan of care.)  intravenous induction   Anesthetic plan and risks discussed with patient.

## 2018-03-13 NOTE — DISCHARGE INSTRUCTIONS
Rest today  No pushing,pulling,tugging,heavy lifting, or strenuous activity   No major decision making,driving,or drinking alcoholic beverages for 24 hours due to the sedation you received  Always use good hand hygiene/washing technique  No driving on pain medication.  .   To assist you in voiding:  Drink plenty of fluids  Listen to running water while attempting to void.    If you are unable to urinate and you have an uncomfortable urge to void or it has been   6 hours since you were discharged, return to the Emergency Room.

## 2018-03-13 NOTE — ANESTHESIA POSTPROCEDURE EVALUATION
Patient: Gabriel Conley Jr.    Procedure Summary     Date:  03/13/18 Room / Location:  [facility] ENDOSCOPY 2 / [facility] ENDOSCOPY    Anesthesia Start:  1249 Anesthesia Stop:  1332    Procedure:  COLONOSCOPY WITH POLYPECTOMY (N/A Anus) Diagnosis:       Screening for colon cancer      (Screening for colon cancer [Z12.11])    Surgeon:  Ifeanyi Conley MD Provider:  Michael Oliver CRNA    Anesthesia Type:  MAC ASA Status:  3          Anesthesia Type: MAC  Last vitals  BP   150/82 (03/13/18 1405)   Temp   99.2 °F (37.3 °C) (03/13/18 1405)   Pulse   66 (03/13/18 1405)   Resp   16 (03/13/18 1405)     SpO2   96 % (03/13/18 1405)     Post Anesthesia Care and Evaluation    Patient location during evaluation: PHASE II  Patient participation: complete - patient participated  Level of consciousness: awake  Pain score: 1  Pain management: adequate  Airway patency: patent  Anesthetic complications: No anesthetic complications  PONV Status: controlled  Cardiovascular status: acceptable and stable  Respiratory status: acceptable and nasal cannula  Hydration status: acceptable

## 2018-03-17 LAB
LAB AP CASE REPORT: NORMAL
Lab: NORMAL
PATH REPORT.FINAL DX SPEC: NORMAL

## 2018-03-19 ENCOUNTER — HOSPITAL ENCOUNTER (OUTPATIENT)
Dept: CT IMAGING | Facility: HOSPITAL | Age: 78
Discharge: HOME | End: 2018-03-19
Admitting: INTERNAL MEDICINE

## 2018-03-19 DIAGNOSIS — R31.29 MICROSCOPIC HEMATURIA: ICD-10-CM

## 2018-03-19 PROCEDURE — 74176 CT ABD & PELVIS W/O CONTRAST: CPT

## 2018-03-29 ENCOUNTER — OFFICE VISIT (OUTPATIENT)
Dept: INTERNAL MEDICINE | Facility: CLINIC | Age: 78
End: 2018-03-29

## 2018-03-29 VITALS
TEMPERATURE: 97.6 F | HEART RATE: 54 BPM | DIASTOLIC BLOOD PRESSURE: 63 MMHG | WEIGHT: 239 LBS | BODY MASS INDEX: 37.51 KG/M2 | OXYGEN SATURATION: 97 % | SYSTOLIC BLOOD PRESSURE: 136 MMHG | HEIGHT: 67 IN | RESPIRATION RATE: 16 BRPM

## 2018-03-29 DIAGNOSIS — I25.810 CORONARY ARTERY DISEASE INVOLVING CORONARY BYPASS GRAFT OF NATIVE HEART WITHOUT ANGINA PECTORIS: ICD-10-CM

## 2018-03-29 DIAGNOSIS — R97.20 ELEVATED PSA: ICD-10-CM

## 2018-03-29 DIAGNOSIS — E55.9 VITAMIN D DEFICIENCY: ICD-10-CM

## 2018-03-29 DIAGNOSIS — E78.5 DYSLIPIDEMIA: ICD-10-CM

## 2018-03-29 DIAGNOSIS — I10 ESSENTIAL HYPERTENSION: Primary | ICD-10-CM

## 2018-03-29 DIAGNOSIS — R31.1 BENIGN ESSENTIAL MICROSCOPIC HEMATURIA: ICD-10-CM

## 2018-03-29 PROCEDURE — G0438 PPPS, INITIAL VISIT: HCPCS | Performed by: INTERNAL MEDICINE

## 2018-03-29 PROCEDURE — 99214 OFFICE O/P EST MOD 30 MIN: CPT | Performed by: INTERNAL MEDICINE

## 2018-03-29 RX ORDER — CHOLECALCIFEROL (VITAMIN D3) 125 MCG
1 CAPSULE ORAL DAILY
Qty: 90 TABLET | Refills: 3 | Status: SHIPPED | OUTPATIENT
Start: 2018-03-29 | End: 2019-07-30 | Stop reason: SDUPTHER

## 2018-03-29 NOTE — PROGRESS NOTES
QUICK REFERENCE INFORMATION:  The ABCs of the Annual Wellness Visit    Initial Medicare Wellness Visit    HEALTH RISK ASSESSMENT    1940    Recent Hospitalizations:  Recently treated at the following:  [facility].        Current Medical Providers:  Patient Care Team:  Frank Torrez MD as PCP - General (Internal Medicine)  Gabriel Rust MD as PCP - Claims Attributed  Ifeanyi Conley MD as Consulting Physician (General Surgery)    Pain: no pain reported.      Smoking Status:  History   Smoking Status   • Never Smoker   Smokeless Tobacco   • Never Used       Alcohol Consumption:  History   Alcohol Use No       Depression Screen:   PHQ-2/PHQ-9 Depression Screening 3/29/2018   Little interest or pleasure in doing things 0   Feeling down, depressed, or hopeless 0   Total Score 0       Health Habits and Functional and Cognitive Screening:  Functional & Cognitive Status 3/29/2018   Do you have difficulty preparing food and eating? No   Do you have difficulty bathing yourself, getting dressed or grooming yourself? No   Do you have difficulty using the toilet? No   Do you have difficulty moving around from place to place? No   Do you have trouble with steps or getting out of a bed or a chair? No   In the past year have you fallen or experienced a near fall? No   Current Diet Well Balanced Diet   Dental Exam Up to date   Eye Exam Up to date   Exercise (times per week) 6 times per week   Current Exercise Activities Include Walking   Do you need help using the phone?  No   Are you deaf or do you have serious difficulty hearing?  No   Do you need help with transportation? No   Do you need help shopping? No   Do you need help preparing meals?  No   Do you need help with housework?  No   Do you need help with laundry? No   Do you need help taking your medications? No   Do you need help managing money? No   Do you ever drive or ride in a car without wearing a seat belt? No   Have you felt unusual  stress, anger or loneliness in the last month? No   Who do you live with? Spouse   If you need help, do you have trouble finding someone available to you? No   Have you been bothered in the last four weeks by sexual problems? No   Do you have difficulty concentrating, remembering or making decisions? No           Does the patient have evidence of cognitive impairment? No    Asiprin use counseling: Taking ASA appropriately as indicated      Recent Lab Results:    Visual Acuity:  No exam data present    Age-appropriate Screening Schedule:  Refer to the list below for future screening recommendations based on patient's age, sex and/or medical conditions. Orders for these recommended tests are listed in the plan section. The patient has been provided with a written plan.    Health Maintenance   Topic Date Due   • TDAP/TD VACCINES (1 - Tdap) 11/06/1959   • PNEUMOCOCCAL VACCINES (65+ LOW/MEDIUM RISK) (2 of 2 - PPSV23) 12/19/2018   • COLONOSCOPY  03/13/2028   • INFLUENZA VACCINE  Completed   • ZOSTER VACCINE  Completed        Subjective   History of Present Illness    Gabriel Conley Jr. is a 77 y.o. male who presents for an Annual Wellness Visit.    The following portions of the patient's history were reviewed and updated as appropriate: allergies, current medications, past family history, past medical history, past social history, past surgical history and problem list.    Outpatient Medications Prior to Visit   Medication Sig Dispense Refill   • aspirin 81 MG tablet Take 81 mg by mouth Daily.     • atorvastatin (LIPITOR) 20 MG tablet one by mouth daily     • cholecalciferol (VITAMIN D3) 1000 units tablet Take 1,000 Units by mouth Daily.     • Coenzyme Q10 100 MG tablet Take 200 mg by mouth Daily.     • fenofibrate (TRICOR) 145 MG tablet one by mouth daily     • furosemide (LASIX) 20 MG tablet TAKE 1 TABLET BY MOUTH DAILY. NEEDS APPOINTMENT FOR ADDITIONAL REFILLS (Patient taking differently: Patient taking 3 times a week.)  30 tablet 11   • melatonin 5 MG tablet tablet Take 5 mg by mouth Every Night.     • metoprolol tartrate (LOPRESSOR) 50 MG tablet Take 50 mg by mouth 2 (Two) Times a Day.     • Misc Natural Products (OSTEO BI-FLEX ADV DOUBLE ST PO) Take 1 tablet by mouth 2 (Two) Times a Day.     • nitroglycerin (NITROSTAT) 0.4 MG SL tablet Place 0.4 mg under the tongue Every 5 (Five) Minutes As Needed.     • Nutritional Supplements (ENSURE HIGH PROTEIN PO) Take 8 oz by mouth Every Morning.     • potassium chloride (K-DUR,KLOR-CON) 20 MEQ CR tablet Take 20 mEq by mouth Daily.     • Psyllium (METAMUCIL) 30.9 % powder Take 1 dose by mouth Daily.     • rivaroxaban (XARELTO) 15 MG tablet Take 15 mg by mouth Daily.     • telmisartan-hydrochlorothiazide (MICARDIS HCT) 80-25 MG per tablet Take  by mouth daily.       No facility-administered medications prior to visit.        Patient Active Problem List   Diagnosis   • Angina pectoris   • Coronary artery disease   • Dyslipidemia   • Elevated fasting glucose   • Encounter for long-term (current) use of medications   • Hernia, hiatal   • Hypertension   • Melena   • Prediabetes   • BMI 36.0-36.9,adult   • Vitamin D deficiency   • Encounter for special screening examination for neoplasm of prostate   • Screening for colon cancer       Advance Care Planning:  has an advance directive - a copy HAS NOT been provided. Have asked the patient to send this to us to add to record.    Identification of Risk Factors:  Risk factors include: weight , unhealthy diet, increased fall risk and polypharmacy.    Review of Systems    Compared to one year ago, the patient feels his physical health is the same.  Compared to one year ago, the patient feels his mental health is the same.    Objective     Physical Exam    Vitals:    03/29/18 1016   BP: 136/63   BP Location: Left arm   Patient Position: Sitting   Cuff Size: Large Adult   Pulse: 54   Resp: 16   Temp: 97.6 °F (36.4 °C)   TempSrc: Oral   SpO2: 97%   Weight:  "108 kg (239 lb)   Height: 168.9 cm (66.5\")       Body mass index is 38 kg/m².  Discussed the patient's BMI with him. BMI is above normal parameters. Follow-up plan includes:  educational material and exercise counseling.    Assessment/Plan   Patient Self-Management and Personalized Health Advice  The patient has been provided with information about: diet, exercise and fall prevention and preventive services including:   · Advance directive, Fall Risk assessment done, Fall Risk plan of care done, TdaP vaccine.    Visit Diagnoses:    ICD-10-CM ICD-9-CM   1. Essential hypertension I10 401.9   2. Dyslipidemia E78.5 272.4   3. Coronary artery disease involving coronary bypass graft of native heart without angina pectoris I25.810 414.05   4. Vitamin D deficiency E55.9 268.9   5. Elevated PSA R97.20 790.93       Orders Placed This Encounter   Procedures   • TSH   • Vitamin B12   • Comprehensive Metabolic Panel   • Lipid Panel   • Vitamin D 25 Hydroxy   • Ambulatory Referral to Urology     Referral Priority:   Routine     Referral Type:   Consultation     Referral Reason:   Specialty Services Required     Referred to Provider:   Cleveland Roldan MD     Requested Specialty:   Urology     Number of Visits Requested:   1   • CBC & Differential     Order Specific Question:   Manual Differential     Answer:   No       Outpatient Encounter Prescriptions as of 3/29/2018   Medication Sig Dispense Refill   • aspirin 81 MG tablet Take 81 mg by mouth Daily.     • atorvastatin (LIPITOR) 20 MG tablet one by mouth daily     • cholecalciferol (VITAMIN D3) 1000 units tablet Take 1,000 Units by mouth Daily.     • Coenzyme Q10 100 MG tablet Take 200 mg by mouth Daily.     • fenofibrate (TRICOR) 145 MG tablet one by mouth daily     • furosemide (LASIX) 20 MG tablet TAKE 1 TABLET BY MOUTH DAILY. NEEDS APPOINTMENT FOR ADDITIONAL REFILLS (Patient taking differently: Patient taking 3 times a week.) 30 tablet 11   • melatonin 5 MG tablet tablet " Take 5 mg by mouth Every Night.     • metoprolol tartrate (LOPRESSOR) 50 MG tablet Take 50 mg by mouth 2 (Two) Times a Day.     • Misc Natural Products (OSTEO BI-FLEX ADV DOUBLE ST PO) Take 1 tablet by mouth 2 (Two) Times a Day.     • nitroglycerin (NITROSTAT) 0.4 MG SL tablet Place 0.4 mg under the tongue Every 5 (Five) Minutes As Needed.     • Nutritional Supplements (ENSURE HIGH PROTEIN PO) Take 8 oz by mouth Every Morning.     • potassium chloride (K-DUR,KLOR-CON) 20 MEQ CR tablet Take 20 mEq by mouth Daily.     • Psyllium (METAMUCIL) 30.9 % powder Take 1 dose by mouth Daily.     • rivaroxaban (XARELTO) 15 MG tablet Take 15 mg by mouth Daily.     • telmisartan-hydrochlorothiazide (MICARDIS HCT) 80-25 MG per tablet Take  by mouth daily.     • B-12, Methylcobalamin, 1000 MCG sublingual tablet Place 1 tablet under the tongue Daily. 90 tablet 3     No facility-administered encounter medications on file as of 3/29/2018.        Reviewed use of high risk medication in the elderly: yes  Reviewed for potential of harmful drug interactions in the elderly: yes    Follow Up:  Return in about 6 months (around 9/29/2018).     An After Visit Summary and PPPS with all of these plans were given to the patient.

## 2018-03-29 NOTE — PROGRESS NOTES
Subjective     Patient ID: Gabriel Conley Jr. is a 77 y.o. male. Patient is here for management of multiple medical problems.     Chief Complaint   Patient presents with   • Hypertension     follow-up     Hypertension   This is a chronic problem. The problem is controlled. Pertinent negatives include no chest pain. Risk factors for coronary artery disease include dyslipidemia, male gender, obesity and stress. Past treatments include diuretics, beta blockers and angiotensin blockers. Current antihypertension treatment includes angiotensin blockers, diuretics and beta blockers. Hypertensive end-organ damage includes CAD/MI. There is no history of heart failure or left ventricular hypertrophy.   Hyperlipidemia   This is a chronic problem. Recent lipid tests were reviewed and are normal. Factors aggravating his hyperlipidemia include thiazides. Pertinent negatives include no chest pain or focal sensory loss. Current antihyperlipidemic treatment includes fibric acid derivatives. Risk factors for coronary artery disease include dyslipidemia, male sex, obesity and stress.        The following portions of the patient's history were reviewed and updated as appropriate: allergies, current medications, past family history, past medical history, past social history, past surgical history and problem list.    Review of Systems   Constitutional: Negative for fatigue.   HENT: Negative for congestion.    Cardiovascular: Negative for chest pain.   Gastrointestinal: Negative for abdominal distention.   Musculoskeletal: Negative for arthralgias.   Psychiatric/Behavioral: Positive for sleep disturbance.   All other systems reviewed and are negative.      Current Outpatient Prescriptions:   •  aspirin 81 MG tablet, Take 81 mg by mouth Daily., Disp: , Rfl:   •  atorvastatin (LIPITOR) 20 MG tablet, one by mouth daily, Disp: , Rfl:   •  cholecalciferol (VITAMIN D3) 1000 units tablet, Take 1,000 Units by mouth Daily., Disp: , Rfl:   •   "Coenzyme Q10 100 MG tablet, Take 200 mg by mouth Daily., Disp: , Rfl:   •  fenofibrate (TRICOR) 145 MG tablet, one by mouth daily, Disp: , Rfl:   •  furosemide (LASIX) 20 MG tablet, TAKE 1 TABLET BY MOUTH DAILY. NEEDS APPOINTMENT FOR ADDITIONAL REFILLS (Patient taking differently: Patient taking 3 times a week.), Disp: 30 tablet, Rfl: 11  •  melatonin 5 MG tablet tablet, Take 5 mg by mouth Every Night., Disp: , Rfl:   •  metoprolol tartrate (LOPRESSOR) 50 MG tablet, Take 50 mg by mouth 2 (Two) Times a Day., Disp: , Rfl:   •  Misc Natural Products (OSTEO BI-FLEX ADV DOUBLE ST PO), Take 1 tablet by mouth 2 (Two) Times a Day., Disp: , Rfl:   •  nitroglycerin (NITROSTAT) 0.4 MG SL tablet, Place 0.4 mg under the tongue Every 5 (Five) Minutes As Needed., Disp: , Rfl:   •  Nutritional Supplements (ENSURE HIGH PROTEIN PO), Take 8 oz by mouth Every Morning., Disp: , Rfl:   •  potassium chloride (K-DUR,KLOR-CON) 20 MEQ CR tablet, Take 20 mEq by mouth Daily., Disp: , Rfl:   •  Psyllium (METAMUCIL) 30.9 % powder, Take 1 dose by mouth Daily., Disp: , Rfl:   •  telmisartan-hydrochlorothiazide (MICARDIS HCT) 80-25 MG per tablet, Take  by mouth daily., Disp: , Rfl:   •  B-12, Methylcobalamin, 1000 MCG sublingual tablet, Place 1 tablet under the tongue Daily., Disp: 90 tablet, Rfl: 3    Objective      Blood pressure 136/63, pulse 54, temperature 97.6 °F (36.4 °C), temperature source Oral, resp. rate 16, height 168.9 cm (66.5\"), weight 108 kg (239 lb), SpO2 97 %.    Physical Exam     General Appearance:    Alert, cooperative, no distress, appears stated age   Head:    Normocephalic, without obvious abnormality, atraumatic   Eyes:    PERRL, conjunctiva/corneas clear, EOM's intact   Ears:    Normal TM's and external ear canals, both ears   Nose:   Nares normal, septum midline, mucosa normal, no drainage   or sinus tenderness   Throat:   Lips, mucosa, and tongue normal; teeth and gums normal   Neck:   Supple, symmetrical, trachea " midline, no adenopathy;        thyroid:  No enlargement/tenderness/nodules; no carotid    bruit or JVD   Back:     Symmetric, no curvature, ROM normal, no CVA tenderness   Lungs:     Clear to auscultation bilaterally, respirations unlabored   Chest wall:    No tenderness or deformity   Heart:    Regular rate and rhythm, S1 and S2 normal, no murmur,        rub or gallop   Abdomen:     Soft, non-tender, bowel sounds active all four quadrants,     no masses, no organomegaly   Extremities:   Extremities normal, atraumatic, no cyanosis or edema   Pulses:   2+ and symmetric all extremities   Skin:   Skin color, texture, turgor normal, no rashes or lesions   Lymph nodes:   Cervical, supraclavicular, and axillary nodes normal   Neurologic:   CNII-XII intact. Normal strength, sensation and reflexes       throughout      Results for orders placed or performed during the hospital encounter of 03/13/18   Tissue Pathology Exam   Result Value Ref Range    Case Report       Surgical Pathology Report                         Case: [id number]                                  Authorizing Provider:  Ifeanyi Conley MD       Collected:           03/13/2018 01:27 PM          Ordering Location:     [facility]    Received:            03/14/2018 02:18 PM                                 [facility]                                                                    Pathologist:           En Gannon MD                                                              Specimens:   1) - Large Intestine, Left / Descending Colon, left colon #1 polyp                                  2) - Large Intestine, Left / Descending Colon, left colon polyp #2                         Final Diagnosis       See Scanned Report        Embedded Images           Assessment/Plan       Gabriel was seen today for hypertension.    Diagnoses and all orders for this visit:    Essential hypertension  -     TSH  -     Vitamin B12  -     Comprehensive Metabolic  Panel  -     CBC & Differential  -     Lipid Panel    Dyslipidemia    Coronary artery disease involving coronary bypass graft of native heart without angina pectoris  -     Vitamin B12  -     Comprehensive Metabolic Panel  -     CBC & Differential  -     Lipid Panel    Vitamin D deficiency  -     Vitamin D 25 Hydroxy    Elevated PSA  -     Cancel: Ambulatory Referral to Urology    Benign essential microscopic hematuria    Other orders  -     B-12, Methylcobalamin, 1000 MCG sublingual tablet; Place 1 tablet under the tongue Daily.      Return in about 4 months (around 7/29/2018).          There are no Patient Instructions on file for this visit.     Frank Torrez MD    Assessment/Plan

## 2018-08-01 ENCOUNTER — OFFICE VISIT (OUTPATIENT)
Dept: INTERNAL MEDICINE | Facility: CLINIC | Age: 78
End: 2018-08-01

## 2018-08-01 VITALS
RESPIRATION RATE: 16 BRPM | BODY MASS INDEX: 38.57 KG/M2 | WEIGHT: 240 LBS | TEMPERATURE: 97.8 F | OXYGEN SATURATION: 96 % | SYSTOLIC BLOOD PRESSURE: 123 MMHG | DIASTOLIC BLOOD PRESSURE: 59 MMHG | HEART RATE: 58 BPM | HEIGHT: 66 IN

## 2018-08-01 DIAGNOSIS — C44.90 SKIN CANCER: ICD-10-CM

## 2018-08-01 DIAGNOSIS — E78.5 DYSLIPIDEMIA: ICD-10-CM

## 2018-08-01 DIAGNOSIS — I10 ESSENTIAL HYPERTENSION: ICD-10-CM

## 2018-08-01 DIAGNOSIS — G47.33 OSA (OBSTRUCTIVE SLEEP APNEA): ICD-10-CM

## 2018-08-01 DIAGNOSIS — H35.61 RETINAL HEMORRHAGE OF RIGHT EYE: Primary | ICD-10-CM

## 2018-08-01 PROCEDURE — 99214 OFFICE O/P EST MOD 30 MIN: CPT | Performed by: INTERNAL MEDICINE

## 2018-08-01 RX ORDER — APIXABAN 5 MG/1
1 TABLET, FILM COATED ORAL 2 TIMES DAILY
Refills: 1 | COMMUNITY
Start: 2018-05-14 | End: 2020-01-29 | Stop reason: SDUPTHER

## 2018-08-01 NOTE — PROGRESS NOTES
Subjective     Patient ID: Gabriel Conley Jr. is a 77 y.o. male. Patient is here for management of multiple medical problems.     Chief Complaint   Patient presents with   • Hypertension     4 month follow-up   • Questions     patient having issues he would prefer to discuss with Dr. Torrez     History of Present Illness     Pt with macular degeneration.  Pt with hemorrhage in back of eye.  Pt given xeralto       Skin lesions and request Derm visit.    Had christiano treated with cpap with Dr Kelly.  Pt didn't tolerate.  Refused treatment.  Sleep upright in lazyboy with humidify.  Open heart surgery in past. 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   States this also hurts with cpap.            The following portions of the patient's history were reviewed and updated as appropriate: allergies, current medications, past family history, past medical history, past social history, past surgical history and problem list.    Review of Systems   Constitutional: Negative for fatigue.   Respiratory: Negative for shortness of breath.    Skin: Positive for color change, pallor, rash and wound.   All other systems reviewed and are negative.      Current Outpatient Prescriptions:   •  aspirin 81 MG tablet, Take 81 mg by mouth Daily., Disp: , Rfl:   •  atorvastatin (LIPITOR) 20 MG tablet, one by mouth daily, Disp: , Rfl:   •  B-12, Methylcobalamin, 1000 MCG sublingual tablet, Place 1 tablet under the tongue Daily., Disp: 90 tablet, Rfl: 3  •  cholecalciferol (VITAMIN D3) 1000 units tablet, Take 1,000 Units by mouth Daily., Disp: , Rfl:   •  Coenzyme Q10 100 MG tablet, Take 200 mg by mouth Daily., Disp: , Rfl:   •  ELIQUIS 5 MG tablet tablet, Take 1 tablet by mouth 2 (Two) Times a Day., Disp: , Rfl: 1  •  fenofibrate (TRICOR) 145 MG tablet, one by mouth daily, Disp: , Rfl:   •  furosemide (LASIX) 20 MG tablet, TAKE 1 TABLET BY MOUTH DAILY. NEEDS APPOINTMENT FOR ADDITIONAL REFILLS (Patient taking differently: Patient taking 3 times a week.),  "Disp: 30 tablet, Rfl: 11  •  melatonin 5 MG tablet tablet, Take 5 mg by mouth Every Night., Disp: , Rfl:   •  metoprolol tartrate (LOPRESSOR) 50 MG tablet, Take 50 mg by mouth 2 (Two) Times a Day., Disp: , Rfl:   •  Misc Natural Products (OSTEO BI-FLEX ADV DOUBLE ST PO), Take 1 tablet by mouth 2 (Two) Times a Day., Disp: , Rfl:   •  nitroglycerin (NITROSTAT) 0.4 MG SL tablet, Place 0.4 mg under the tongue Every 5 (Five) Minutes As Needed., Disp: , Rfl:   •  Nutritional Supplements (ENSURE HIGH PROTEIN PO), Take 8 oz by mouth Every Morning., Disp: , Rfl:   •  potassium chloride (K-DUR,KLOR-CON) 20 MEQ CR tablet, Take 20 mEq by mouth Daily., Disp: , Rfl:   •  Psyllium (METAMUCIL) 30.9 % powder, Take 1 dose by mouth Daily., Disp: , Rfl:   •  telmisartan-hydrochlorothiazide (MICARDIS HCT) 80-25 MG per tablet, Take  by mouth daily., Disp: , Rfl:     Objective      Blood pressure 123/59, pulse 58, temperature 97.8 °F (36.6 °C), temperature source Oral, resp. rate 16, height 167.6 cm (66\"), weight 109 kg (240 lb), SpO2 96 %.    Physical Exam     General Appearance:    Morbidity.    Alert, cooperative, no distress, appears stated age   Head:    Normocephalic, without obvious abnormality, atraumatic   Eyes:    PERRL, conjunctiva/corneas clear, EOM's intact   Ears:    Normal TM's and external ear canals, both ears   Nose:   Nares normal, septum midline, mucosa normal, no drainage   or sinus tenderness   Throat:   Narrowed oral airway.  Lips, mucosa, and tongue normal; teeth and gums normal   Neck:   Supple, symmetrical, trachea midline, no adenopathy;        thyroid:  No enlargement/tenderness/nodules; no carotid    bruit or JVD   Back:     Symmetric, no curvature, ROM normal, no CVA tenderness   Lungs:     Clear to auscultation bilaterally, respirations unlabored   Chest wall:    No tenderness or deformity   Heart:    Regular rate and rhythm, S1 and S2 normal, no murmur,        rub or gallop   Abdomen:     Soft, non-tender, " bowel sounds active all four quadrants,     no masses, no organomegaly   Extremities:   Extremities normal, atraumatic, no cyanosis or edema   Pulses:   2+ and symmetric all extremities   Skin:   Lesion Many SK on on back.  Squamous on right face.     Lymph nodes:   Cervical, supraclavicular, and axillary nodes normal   Neurologic:   CNII-XII intact. Normal strength, sensation and reflexes       throughout      Results for orders placed or performed during the hospital encounter of 03/13/18   Tissue Pathology Exam   Result Value Ref Range    Case Report       Surgical Pathology Report                         Case: [id number]                                  Authorizing Provider:  Ifeanyi Conley MD       Collected:           03/13/2018 01:27 PM          Ordering Location[facility]     [facility]    Received:            03/14/2018 02:18 PM                                 [facility]                                                                    Pathologist:           En Gannon MD                                                              Specimens:   1) - Large Intestine, Left / Descending Colon, left colon #1 polyp                                  2) - Large Intestine, Left / Descending Colon, left colon polyp #2                         Final Diagnosis       See Scanned Report        Embedded Images           Assessment/Plan     Pt need labs done.  Pt needs rory treated. Difficult with cpap in past.  Will get in with DR Navarro.     Pt will get tdap from HD.    Eye followed by retina specialist. MD nelson get worse without treatment of rory.  Pt informed.        Gabriel was seen today for hypertension and questions.    Diagnoses and all orders for this visit:    Retinal hemorrhage of right eye    Dyslipidemia    Essential hypertension    RORY (obstructive sleep apnea)  -     Ambulatory Referral to Neurology    Skin cancer  -     Ambulatory Referral to Dermatology      Return in about 3 months (around 11/1/2018).           There are no Patient Instructions on file for this visit.     Frank Torrez MD    Assessment/Plan

## 2018-11-01 ENCOUNTER — OFFICE VISIT (OUTPATIENT)
Dept: INTERNAL MEDICINE | Facility: CLINIC | Age: 78
End: 2018-11-01

## 2018-11-01 VITALS
HEART RATE: 56 BPM | TEMPERATURE: 97.9 F | WEIGHT: 238 LBS | DIASTOLIC BLOOD PRESSURE: 67 MMHG | SYSTOLIC BLOOD PRESSURE: 126 MMHG | RESPIRATION RATE: 16 BRPM | BODY MASS INDEX: 37.35 KG/M2 | OXYGEN SATURATION: 96 % | HEIGHT: 67 IN

## 2018-11-01 DIAGNOSIS — G47.33 OSA (OBSTRUCTIVE SLEEP APNEA): ICD-10-CM

## 2018-11-01 DIAGNOSIS — K21.9 GASTROESOPHAGEAL REFLUX DISEASE WITHOUT ESOPHAGITIS: ICD-10-CM

## 2018-11-01 DIAGNOSIS — K27.9 PUD (PEPTIC ULCER DISEASE): ICD-10-CM

## 2018-11-01 DIAGNOSIS — K44.9 HERNIA, HIATAL: ICD-10-CM

## 2018-11-01 DIAGNOSIS — H57.89 BLEEDING OF EYE: ICD-10-CM

## 2018-11-01 DIAGNOSIS — M79.646 THUMB PAIN, UNSPECIFIED LATERALITY: ICD-10-CM

## 2018-11-01 DIAGNOSIS — I48.91 ATRIAL FIBRILLATION, UNSPECIFIED TYPE (HCC): Primary | ICD-10-CM

## 2018-11-01 PROCEDURE — 99214 OFFICE O/P EST MOD 30 MIN: CPT | Performed by: INTERNAL MEDICINE

## 2018-11-01 RX ORDER — NICOTINE POLACRILEX 4 MG/1
20 GUM, CHEWING ORAL DAILY
Qty: 90 EACH | Refills: 3 | Status: SHIPPED | OUTPATIENT
Start: 2018-11-01 | End: 2019-11-29 | Stop reason: SDUPTHER

## 2018-11-01 NOTE — PROGRESS NOTES
Subjective     Patient ID: Gabriel Conley Jr. is a 77 y.o. male. Patient is here for management of multiple medical problems.     Chief Complaint   Patient presents with   • Hyperlipidemia     follow-up   • Hypertension     follow-up   • Heartburn     patient states he has a hiatal hernia and he is having increased gas and acid reflux needs to discuss medication   • Carpel Tunnel     patient having pain in the right wrist needs to discuss medication options   • Blood Thinner     patient needs to discuss eye issues and blood thinner medication     History of Present Illness        recent retinal bleed. Started with bleeding in gums and bladder and gi on Xeralto.   Changed to eliquis now with retinal bleed.  Had episode of a fib in the past under extreme condtion last year.   Pt reports no further episodes of a fib.    Had open heart surgery.       Has dx of christiano. Unable to tolerate due to sternal pain on cpap.    Pt reluctant to get back on cpap.    Missed christiano apt.        Increase upper gi issues.  Gas bloating and ab pain.            The following portions of the patient's history were reviewed and updated as appropriate: allergies, current medications, past family history, past medical history, past social history, past surgical history and problem list.    Review of Systems   Constitutional: Positive for fatigue.   Eyes: Positive for pain and visual disturbance.   Psychiatric/Behavioral: Positive for sleep disturbance.   All other systems reviewed and are negative.      Current Outpatient Prescriptions:   •  aspirin 81 MG tablet, Take 81 mg by mouth Daily., Disp: , Rfl:   •  atorvastatin (LIPITOR) 20 MG tablet, one by mouth daily, Disp: , Rfl:   •  B-12, Methylcobalamin, 1000 MCG sublingual tablet, Place 1 tablet under the tongue Daily., Disp: 90 tablet, Rfl: 3  •  cholecalciferol (VITAMIN D3) 1000 units tablet, Take 1,000 Units by mouth Daily., Disp: , Rfl:   •  Coenzyme Q10 100 MG tablet, Take 200 mg by mouth  "Daily., Disp: , Rfl:   •  ELIQUIS 5 MG tablet tablet, Take 1 tablet by mouth 2 (Two) Times a Day., Disp: , Rfl: 1  •  fenofibrate (TRICOR) 145 MG tablet, one by mouth daily, Disp: , Rfl:   •  furosemide (LASIX) 20 MG tablet, TAKE 1 TABLET BY MOUTH DAILY. NEEDS APPOINTMENT FOR ADDITIONAL REFILLS (Patient taking differently: Patient taking 3 times a week.), Disp: 30 tablet, Rfl: 11  •  melatonin 5 MG tablet tablet, Take 5 mg by mouth Every Night., Disp: , Rfl:   •  metoprolol tartrate (LOPRESSOR) 50 MG tablet, Take 50 mg by mouth 2 (Two) Times a Day., Disp: , Rfl:   •  Misc Natural Products (OSTEO BI-FLEX ADV DOUBLE ST PO), Take 1 tablet by mouth 2 (Two) Times a Day., Disp: , Rfl:   •  Multiple Vitamins-Minerals (ICAPS AREDS 2) capsule, Take  by mouth 2 (Two) Times a Day., Disp: , Rfl:   •  nitroglycerin (NITROSTAT) 0.4 MG SL tablet, Place 0.4 mg under the tongue Every 5 (Five) Minutes As Needed., Disp: , Rfl:   •  Nutritional Supplements (ENSURE HIGH PROTEIN PO), Take 8 oz by mouth Every Morning., Disp: , Rfl:   •  potassium chloride (K-DUR,KLOR-CON) 20 MEQ CR tablet, Take 20 mEq by mouth Daily., Disp: , Rfl:   •  Psyllium (METAMUCIL) 30.9 % powder, Take 1 dose by mouth Daily., Disp: , Rfl:   •  telmisartan-hydrochlorothiazide (MICARDIS HCT) 80-25 MG per tablet, Take  by mouth daily., Disp: , Rfl:   •  Omeprazole (EQ OMEPRAZOLE) 20 MG tablet delayed-release, Take 20 mg by mouth Daily., Disp: 90 each, Rfl: 3    Objective      Blood pressure 126/67, pulse 56, temperature 97.9 °F (36.6 °C), temperature source Oral, resp. rate 16, height 168.9 cm (66.5\"), weight 108 kg (238 lb), SpO2 96 %.    Physical Exam     General Appearance:    Alert, cooperative, no distress, appears stated age   Head:    Normocephalic, without obvious abnormality, atraumatic   Eyes:    PERRL, conjunctiva/corneas clear, EOM's intact   Ears:    Normal TM's and external ear canals, both ears   Nose:   Nares normal, septum midline, mucosa normal, no " drainage   or sinus tenderness   Throat:   Lips, mucosa, and tongue normal; teeth and gums normal   Neck:   Supple, symmetrical, trachea midline, no adenopathy;        thyroid:  No enlargement/tenderness/nodules; no carotid    bruit or JVD   Back:     Symmetric, no curvature, ROM normal, no CVA tenderness   Lungs:     Clear to auscultation bilaterally, respirations unlabored   Chest wall:    No tenderness or deformity   Heart:    Regular rate and rhythm, S1 and S2 normal, no murmur,        rub or gallop   Abdomen:     Soft, non-tender, bowel sounds active all four quadrants,     no masses, no organomegaly   Extremities:   Extremities normal, atraumatic, no cyanosis or edema   Pulses:   2+ and symmetric all extremities   Skin:   Skin color, texture, turgor normal, no rashes or lesions   Lymph nodes:   Cervical, supraclavicular, and axillary nodes normal   Neurologic:   CNII-XII intact. Normal strength, sensation and reflexes       throughout      Results for orders placed or performed during the hospital encounter of 03/13/18   Tissue Pathology Exam   Result Value Ref Range    Case Report       Surgical Pathology Report                         Case: [id number]                                  Authorizing Provider:  Ifeanyi Conley MD       Collected:           03/13/2018 01:27 PM          Ordering Location:     [facility]    Received:            03/14/2018 02:18 PM                                 [facility]                                                                    Pathologist:           En Gannon MD                                                              Specimens:   1) - Large Intestine, Left / Descending Colon, left colon #1 polyp                                  2) - Large Intestine, Left / Descending Colon, left colon polyp #2                         Final Diagnosis       See Scanned Report        Embedded Images           Assessment/Plan   Increase pud symptoms. Pt does well on  ppi.not on as he is worried about med SE.   Pt will start ppi today.    Pt with NSR for now. Needs monitored to see if eliquis is needed. On eliquis with retinal bleed. Pt would like 2nd opinion for cardiac condition.         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      Gabriel was seen today for hyperlipidemia, hypertension, heartburn, carpel tunnel and blood thinner.    Diagnoses and all orders for this visit:    Atrial fibrillation, unspecified type (CMS/[facility])  -     Ambulatory Referral to Cardiology  -     Ambulatory Referral to Neurology    Bleeding of eye  -     Ambulatory Referral to Cardiology  -     Ambulatory Referral to Neurology    Gastroesophageal reflux disease without esophagitis  -     Omeprazole (EQ OMEPRAZOLE) 20 MG tablet delayed-release; Take 20 mg by mouth Daily.  -     Ambulatory Referral to Cardiology    PUD (peptic ulcer disease)  -     Omeprazole (EQ OMEPRAZOLE) 20 MG tablet delayed-release; Take 20 mg by mouth Daily.  -     Ambulatory Referral to Cardiology    Hernia, hiatal  -     Omeprazole (EQ OMEPRAZOLE) 20 MG tablet delayed-release; Take 20 mg by mouth Daily.    Thumb pain, unspecified laterality    RORY (obstructive sleep apnea)  -     Ambulatory Referral to Neurology      No Follow-up on file.          There are no Patient Instructions on file for this visit.     Frank Torrez MD    Assessment/Plan

## 2018-12-14 ENCOUNTER — OFFICE VISIT (OUTPATIENT)
Dept: INTERNAL MEDICINE | Facility: CLINIC | Age: 78
End: 2018-12-14

## 2018-12-14 VITALS
DIASTOLIC BLOOD PRESSURE: 67 MMHG | HEART RATE: 52 BPM | RESPIRATION RATE: 16 BRPM | TEMPERATURE: 97.9 F | WEIGHT: 236 LBS | BODY MASS INDEX: 37.93 KG/M2 | SYSTOLIC BLOOD PRESSURE: 115 MMHG | HEIGHT: 66 IN | OXYGEN SATURATION: 96 %

## 2018-12-14 DIAGNOSIS — I10 ESSENTIAL HYPERTENSION: Primary | ICD-10-CM

## 2018-12-14 DIAGNOSIS — H35.3211 EXUDATIVE AGE-RELATED MACULAR DEGENERATION OF RIGHT EYE WITH ACTIVE CHOROIDAL NEOVASCULARIZATION (HCC): ICD-10-CM

## 2018-12-14 DIAGNOSIS — E78.5 DYSLIPIDEMIA: ICD-10-CM

## 2018-12-14 PROCEDURE — 99213 OFFICE O/P EST LOW 20 MIN: CPT | Performed by: INTERNAL MEDICINE

## 2018-12-14 NOTE — PROGRESS NOTES
Subjective     Patient ID: Gabriel Conley Jr. is a 78 y.o. male. Patient is here for management of multiple medical problems.     Chief Complaint   Patient presents with   • Hypertension     follow-up   • Atrial Fibrillation     follow-up     Hypertension   This is a chronic problem. The current episode started more than 1 year ago. The problem is unchanged. The problem is controlled. Pertinent negatives include no anxiety, blurred vision, chest pain, headaches, palpitations or shortness of breath. There are no associated agents to hypertension. Past treatments include diuretics and angiotensin blockers. Current antihypertension treatment includes beta blockers and angiotensin blockers.   Atrial Fibrillation   Symptoms are negative for chest pain, palpitations and shortness of breath. Past medical history includes atrial fibrillation.        Shaun eval in FEB.          The following portions of the patient's history were reviewed and updated as appropriate: allergies, current medications, past family history, past medical history, past social history, past surgical history and problem list.    Review of Systems   Constitutional: Positive for fatigue.   Eyes: Negative for blurred vision.   Respiratory: Negative for shortness of breath.    Cardiovascular: Negative for chest pain and palpitations.   Neurological: Negative for headaches.   Psychiatric/Behavioral: Negative for sleep disturbance.   All other systems reviewed and are negative.      Current Outpatient Medications:   •  atorvastatin (LIPITOR) 20 MG tablet, one by mouth daily, Disp: , Rfl:   •  B-12, Methylcobalamin, 1000 MCG sublingual tablet, Place 1 tablet under the tongue Daily., Disp: 90 tablet, Rfl: 3  •  cholecalciferol (VITAMIN D3) 1000 units tablet, Take 1,000 Units by mouth Daily., Disp: , Rfl:   •  Coenzyme Q10 100 MG tablet, Take 200 mg by mouth Daily., Disp: , Rfl:   •  ELIQUIS 5 MG tablet tablet, Take 1 tablet by mouth 2 (Two) Times a Day., Disp: ,  "Rfl: 1  •  fenofibrate (TRICOR) 145 MG tablet, one by mouth daily, Disp: , Rfl:   •  furosemide (LASIX) 20 MG tablet, TAKE 1 TABLET BY MOUTH DAILY. NEEDS APPOINTMENT FOR ADDITIONAL REFILLS (Patient taking differently: Patient taking 3 times a week.), Disp: 30 tablet, Rfl: 11  •  melatonin 5 MG tablet tablet, Take 5 mg by mouth Every Night., Disp: , Rfl:   •  metoprolol tartrate (LOPRESSOR) 50 MG tablet, Take 50 mg by mouth 2 (Two) Times a Day., Disp: , Rfl:   •  Misc Natural Products (OSTEO BI-FLEX ADV DOUBLE ST PO), Take 1 tablet by mouth 2 (Two) Times a Day., Disp: , Rfl:   •  Multiple Vitamins-Minerals (ICAPS AREDS 2) capsule, Take  by mouth 2 (Two) Times a Day., Disp: , Rfl:   •  nitroglycerin (NITROSTAT) 0.4 MG SL tablet, Place 0.4 mg under the tongue Every 5 (Five) Minutes As Needed., Disp: , Rfl:   •  Nutritional Supplements (ENSURE HIGH PROTEIN PO), Take 8 oz by mouth Every Morning., Disp: , Rfl:   •  Omeprazole (EQ OMEPRAZOLE) 20 MG tablet delayed-release, Take 20 mg by mouth Daily., Disp: 90 each, Rfl: 3  •  potassium chloride (K-DUR,KLOR-CON) 20 MEQ CR tablet, Take 20 mEq by mouth Daily., Disp: , Rfl:   •  Psyllium (METAMUCIL) 30.9 % powder, Take 1 dose by mouth Daily., Disp: , Rfl:   •  telmisartan-hydrochlorothiazide (MICARDIS HCT) 80-25 MG per tablet, Take  by mouth daily., Disp: , Rfl:     Objective      Blood pressure 115/67, pulse 52, temperature 97.9 °F (36.6 °C), temperature source Oral, resp. rate 16, height 167.6 cm (66\"), weight 107 kg (236 lb), SpO2 96 %.    Physical Exam     General Appearance:    Alert, cooperative, no distress, appears stated age   Head:    Normocephalic, without obvious abnormality, atraumatic   Eyes:    PERRL, conjunctiva/corneas clear, EOM's intact   Ears:    Normal TM's and external ear canals, both ears   Nose:   Nares normal, septum midline, mucosa normal, no drainage   or sinus tenderness   Throat:   Thick neck.   Lips, mucosa, and tongue normal; teeth and gums " normal   Neck:   Supple, symmetrical, trachea midline, no adenopathy;        thyroid:  No enlargement/tenderness/nodules; no carotid    bruit or JVD   Back:     Symmetric, no curvature, ROM normal, no CVA tenderness   Lungs:     Clear to auscultation bilaterally, respirations unlabored   Chest wall:    No tenderness or deformity   Heart:    Regular rate and rhythm, S1 and S2 normal, no murmur,        rub or gallop   Abdomen:     Soft, non-tender, bowel sounds active all four quadrants,     no masses, no organomegaly   Extremities:   Extremities normal, atraumatic, no cyanosis or edema   Pulses:   2+ and symmetric all extremities   Skin:   Skin color, texture, turgor normal, no rashes or lesions   Lymph nodes:   Cervical, supraclavicular, and axillary nodes normal   Neurologic:   CNII-XII intact. Normal strength, sensation and reflexes       throughout      Results for orders placed or performed during the hospital encounter of 03/13/18   Tissue Pathology Exam   Result Value Ref Range    Case Report       Surgical Pathology Report                         Case: [id number]                                  Authorizing Provider:  Ifeanyi Conley MD       Collected:           03/13/2018 01:27 PM          Ordering Location:     [facility]    Received:            03/14/2018 02:18 PM                                 [facility]                                                                    Pathologist:           En Gannon MD                                                              Specimens:   1) - Large Intestine, Left / Descending Colon, left colon #1 polyp                                  2) - Large Intestine, Left / Descending Colon, left colon polyp #2                         Final Diagnosis       See Scanned Report        Embedded Images           Assessment/Plan     Pt needs labs done.   pt in NSR today.    Needs to get sleep study down.      Pt getting inj in eye soon. Pt told MD can be related  to untreated christiano.        Get Hep a vac.      Gabriel was seen today for hypertension and atrial fibrillation.    Diagnoses and all orders for this visit:    Essential hypertension    Dyslipidemia    Exudative age-related macular degeneration of right eye with active choroidal neovascularization (CMS/HCC)      Return in about 6 weeks (around 1/25/2019).          Patient Instructions   [facility]         Frank Torrez MD    Assessment/Plan

## 2018-12-17 LAB
25(OH)D3+25(OH)D2 SERPL-MCNC: 31.9 NG/ML
ALBUMIN SERPL-MCNC: 4.2 G/DL (ref 3.5–5)
ALBUMIN/GLOB SERPL: 1.6 G/DL (ref 1–2)
ALP SERPL-CCNC: 76 U/L (ref 38–126)
ALT SERPL-CCNC: 36 U/L (ref 13–69)
AST SERPL-CCNC: 27 U/L (ref 15–46)
BASOPHILS # BLD AUTO: 0.04 10*3/MM3 (ref 0–0.2)
BASOPHILS NFR BLD AUTO: 0.5 % (ref 0–2.5)
BILIRUB SERPL-MCNC: 0.6 MG/DL (ref 0.2–1.3)
BUN SERPL-MCNC: 22 MG/DL (ref 7–20)
BUN/CREAT SERPL: 18.3 (ref 6.3–21.9)
CALCIUM SERPL-MCNC: 9.1 MG/DL (ref 8.4–10.2)
CHLORIDE SERPL-SCNC: 105 MMOL/L (ref 98–107)
CHOLEST SERPL-MCNC: 149 MG/DL (ref 0–199)
CO2 SERPL-SCNC: 28 MMOL/L (ref 26–30)
CREAT SERPL-MCNC: 1.2 MG/DL (ref 0.6–1.3)
EOSINOPHIL # BLD AUTO: 0.26 10*3/MM3 (ref 0–0.7)
EOSINOPHIL NFR BLD AUTO: 3.3 % (ref 0–7)
ERYTHROCYTE [DISTWIDTH] IN BLOOD BY AUTOMATED COUNT: 14.1 % (ref 11.5–14.5)
GLOBULIN SER CALC-MCNC: 2.7 GM/DL
GLUCOSE SERPL-MCNC: 89 MG/DL (ref 74–98)
HCT VFR BLD AUTO: 43.9 % (ref 42–52)
HDLC SERPL-MCNC: 34 MG/DL (ref 40–60)
HGB BLD-MCNC: 15 G/DL (ref 14–18)
IMM GRANULOCYTES # BLD: 0.02 10*3/MM3 (ref 0–0.06)
IMM GRANULOCYTES NFR BLD: 0.3 % (ref 0–0.6)
LDLC SERPL CALC-MCNC: 71 MG/DL (ref 0–99)
LYMPHOCYTES # BLD AUTO: 3.15 10*3/MM3 (ref 0.6–3.4)
LYMPHOCYTES NFR BLD AUTO: 39.9 % (ref 10–50)
MCH RBC QN AUTO: 31 PG (ref 27–31)
MCHC RBC AUTO-ENTMCNC: 34.2 G/DL (ref 30–37)
MCV RBC AUTO: 90.7 FL (ref 80–94)
MONOCYTES # BLD AUTO: 0.64 10*3/MM3 (ref 0–0.9)
MONOCYTES NFR BLD AUTO: 8.1 % (ref 0–12)
NEUTROPHILS # BLD AUTO: 3.78 10*3/MM3 (ref 2–6.9)
NEUTROPHILS NFR BLD AUTO: 47.9 % (ref 37–80)
NRBC BLD AUTO-RTO: 0 /100 WBC (ref 0–0)
PLATELET # BLD AUTO: 140 10*3/MM3 (ref 130–400)
POTASSIUM SERPL-SCNC: 3.6 MMOL/L (ref 3.5–5.1)
PROT SERPL-MCNC: 6.9 G/DL (ref 6.3–8.2)
RBC # BLD AUTO: 4.84 10*6/MM3 (ref 4.7–6.1)
SODIUM SERPL-SCNC: 141 MMOL/L (ref 137–145)
TRIGL SERPL-MCNC: 221 MG/DL
TSH SERPL DL<=0.005 MIU/L-ACNC: 2.85 MIU/ML (ref 0.47–4.68)
VIT B12 SERPL-MCNC: 817 PG/ML (ref 239–931)
VLDLC SERPL CALC-MCNC: 44.2 MG/DL
WBC # BLD AUTO: 7.89 10*3/MM3 (ref 4.8–10.8)

## 2019-01-28 ENCOUNTER — OFFICE VISIT (OUTPATIENT)
Dept: INTERNAL MEDICINE | Facility: CLINIC | Age: 79
End: 2019-01-28

## 2019-01-28 VITALS
DIASTOLIC BLOOD PRESSURE: 65 MMHG | BODY MASS INDEX: 38.25 KG/M2 | SYSTOLIC BLOOD PRESSURE: 121 MMHG | HEIGHT: 66 IN | HEART RATE: 65 BPM | WEIGHT: 238 LBS | OXYGEN SATURATION: 96 % | RESPIRATION RATE: 16 BRPM | TEMPERATURE: 97.6 F

## 2019-01-28 DIAGNOSIS — I10 ESSENTIAL HYPERTENSION: ICD-10-CM

## 2019-01-28 DIAGNOSIS — R79.9 ABNORMAL FINDING OF BLOOD CHEMISTRY: ICD-10-CM

## 2019-01-28 DIAGNOSIS — I25.810 CORONARY ARTERY DISEASE INVOLVING CORONARY BYPASS GRAFT OF NATIVE HEART WITHOUT ANGINA PECTORIS: Primary | ICD-10-CM

## 2019-01-28 PROCEDURE — 99213 OFFICE O/P EST LOW 20 MIN: CPT | Performed by: INTERNAL MEDICINE

## 2019-01-28 RX ORDER — TELMISARTAN AND HYDROCHLORTHIAZIDE 80; 25 MG/1; MG/1
1 TABLET ORAL DAILY
Qty: 90 TABLET | Refills: 3 | Status: SHIPPED | OUTPATIENT
Start: 2019-01-28 | End: 2020-05-19 | Stop reason: SDUPTHER

## 2019-01-28 RX ORDER — CHOLECALCIFEROL (VITAMIN D3) 125 MCG
1 CAPSULE ORAL DAILY
Qty: 90 TABLET | Refills: 3 | Status: SHIPPED | OUTPATIENT
Start: 2019-01-28 | End: 2020-04-06 | Stop reason: SDUPTHER

## 2019-01-28 RX ORDER — METOPROLOL TARTRATE 50 MG/1
50 TABLET, FILM COATED ORAL 2 TIMES DAILY
Qty: 180 TABLET | Refills: 3 | Status: SHIPPED | OUTPATIENT
Start: 2019-01-28 | End: 2020-01-16

## 2019-01-28 RX ORDER — ATORVASTATIN CALCIUM 20 MG/1
20 TABLET, FILM COATED ORAL DAILY
Qty: 90 TABLET | Refills: 3 | Status: SHIPPED | OUTPATIENT
Start: 2019-01-28 | End: 2019-08-13 | Stop reason: SDUPTHER

## 2019-01-28 NOTE — PROGRESS NOTES
Subjective     Patient ID: Gabriel Conley Jr. is a 78 y.o. male. Patient is here for management of multiple medical problems.     Chief Complaint   Patient presents with   • Hypertension     follow-up   • Med Refill     patient needs refills on Micardis, Vitamin B12, Atorvastatin, Metoprolol and Potassium sent to [facility].     History of Present Illness     Pt on [place] dream  Cream otc working well for thumb pain.            The following portions of the patient's history were reviewed and updated as appropriate: allergies, current medications, past family history, past medical history, past social history, past surgical history and problem list.    Review of Systems   Constitutional: Positive for fatigue.   HENT: Negative for congestion, drooling, hearing loss, sinus pressure, sinus pain and sneezing.    Gastrointestinal: Negative for abdominal distention, abdominal pain, anal bleeding and constipation.   Musculoskeletal: Negative for gait problem, joint swelling, myalgias and neck pain.   Psychiatric/Behavioral: Negative for hallucinations and self-injury. The patient is not nervous/anxious and is not hyperactive.    All other systems reviewed and are negative.      Current Outpatient Medications:   •  atorvastatin (LIPITOR) 20 MG tablet, Take 1 tablet by mouth Daily., Disp: 90 tablet, Rfl: 3  •  B-12, Methylcobalamin, 1000 MCG sublingual tablet, Place 1 tablet under the tongue Daily., Disp: 90 tablet, Rfl: 3  •  Coenzyme Q10 100 MG tablet, Take 200 mg by mouth Daily., Disp: , Rfl:   •  ELIQUIS 5 MG tablet tablet, Take 1 tablet by mouth 2 (Two) Times a Day., Disp: , Rfl: 1  •  furosemide (LASIX) 20 MG tablet, TAKE 1 TABLET BY MOUTH DAILY. NEEDS APPOINTMENT FOR ADDITIONAL REFILLS (Patient taking differently: Patient taking 3 times a week.), Disp: 30 tablet, Rfl: 11  •  melatonin 5 MG tablet tablet, Take 5 mg by mouth Every Night., Disp: , Rfl:   •  metoprolol tartrate (LOPRESSOR) 50 MG tablet, Take 1 tablet by mouth  "2 (Two) Times a Day., Disp: 180 tablet, Rfl: 3  •  Misc Natural Products (OSTEO BI-FLEX ADV DOUBLE ST PO), Take 1 tablet by mouth 2 (Two) Times a Day., Disp: , Rfl:   •  Multiple Vitamins-Minerals (ICAPS AREDS 2) capsule, Take  by mouth 2 (Two) Times a Day., Disp: , Rfl:   •  nitroglycerin (NITROSTAT) 0.4 MG SL tablet, Place 0.4 mg under the tongue Every 5 (Five) Minutes As Needed., Disp: , Rfl:   •  Nutritional Supplements (ENSURE HIGH PROTEIN PO), Take 8 oz by mouth Every Morning., Disp: , Rfl:   •  Omeprazole (EQ OMEPRAZOLE) 20 MG tablet delayed-release, Take 20 mg by mouth Daily., Disp: 90 each, Rfl: 3  •  potassium chloride (K-DUR,KLOR-CON) 20 MEQ CR tablet, Take 20 mEq by mouth Daily., Disp: , Rfl:   •  Psyllium (METAMUCIL) 30.9 % powder, Take 1 dose by mouth Daily., Disp: , Rfl:   •  telmisartan-hydrochlorothiazide (MICARDIS HCT) 80-25 MG per tablet, Take 1 tablet by mouth Daily., Disp: 90 tablet, Rfl: 3  •  B-12, Methylcobalamin, 1000 MCG sublingual tablet, Place 1 tablet under the tongue Daily., Disp: 90 tablet, Rfl: 3    Objective      Blood pressure 121/65, pulse 65, temperature 97.6 °F (36.4 °C), temperature source Oral, resp. rate 16, height 167.6 cm (66\"), weight 108 kg (238 lb), SpO2 96 %.    Physical Exam     General Appearance:    Alert, cooperative, no distress, appears stated age   Head:    Normocephalic, without obvious abnormality, atraumatic   Eyes:    PERRL, conjunctiva/corneas clear, EOM's intact   Ears:    Normal TM's and external ear canals, both ears   Nose:   Nares normal, septum midline, mucosa normal, no drainage   or sinus tenderness   Throat:   Lips, mucosa, and tongue normal; teeth and gums normal   Neck:   Supple, symmetrical, trachea midline, no adenopathy;        thyroid:  No enlargement/tenderness/nodules; no carotid    bruit or JVD   Back:     Symmetric, no curvature, ROM normal, no CVA tenderness   Lungs:     Clear to auscultation bilaterally, respirations unlabored   Chest " wall:    No tenderness or deformity   Heart:    Regular rate and rhythm, S1 and S2 normal, no murmur,        rub or gallop   Abdomen:     Soft, non-tender, bowel sounds active all four quadrants,     no masses, no organomegaly   Extremities:   Extremities normal, atraumatic, no cyanosis or edema   Pulses:   2+ and symmetric all extremities   Skin:   sk all over back. Skin color, texture, turgor normal, no rashes or lesions   Lymph nodes:   Cervical, supraclavicular, and axillary nodes normal   Neurologic:   CNII-XII intact. Normal strength, sensation and reflexes       throughout      Results for orders placed or performed in visit on 03/29/18   TSH   Result Value Ref Range    TSH 2.850 0.470 - 4.680 mIU/mL   Vitamin B12   Result Value Ref Range    Vitamin B-12 817 239 - 931 pg/mL   Comprehensive Metabolic Panel   Result Value Ref Range    Glucose 89 74 - 98 mg/dL    BUN 22 (H) 7 - 20 mg/dL    Creatinine 1.20 0.60 - 1.30 mg/dL    eGFR Non African Am 59 (L) >60 mL/min/1.73    eGFR African Am 71 >60 mL/min/1.73    BUN/Creatinine Ratio 18.3 6.3 - 21.9    Sodium 141 137 - 145 mmol/L    Potassium 3.6 3.5 - 5.1 mmol/L    Chloride 105 98 - 107 mmol/L    Total CO2 28.0 26.0 - 30.0 mmol/L    Calcium 9.1 8.4 - 10.2 mg/dL    Total Protein 6.9 6.3 - 8.2 g/dL    Albumin 4.20 3.50 - 5.00 g/dL    Globulin 2.7 gm/dL    A/G Ratio 1.6 1.0 - 2.0 g/dL    Total Bilirubin 0.6 0.2 - 1.3 mg/dL    Alkaline Phosphatase 76 38 - 126 U/L    AST (SGOT) 27 15 - 46 U/L    ALT (SGPT) 36 13 - 69 U/L   Lipid Panel   Result Value Ref Range    Total Cholesterol 149 0 - 199 mg/dL    Triglycerides 221 (H) <150 mg/dL    HDL Cholesterol 34 (L) 40 - 60 mg/dL    VLDL Cholesterol 44.2 mg/dL    LDL Cholesterol  71 0 - 99 mg/dL   Vitamin D 25 Hydroxy   Result Value Ref Range    25 Hydroxy, Vitamin D 31.9 ng/ml   CBC & Differential   Result Value Ref Range    WBC 7.89 4.80 - 10.80 10*3/mm3    RBC 4.84 4.70 - 6.10 10*6/mm3    Hemoglobin 15.0 14.0 - 18.0 g/dL     Hematocrit 43.9 42.0 - 52.0 %    MCV 90.7 80.0 - 94.0 fL    MCH 31.0 27.0 - 31.0 pg    MCHC 34.2 30.0 - 37.0 g/dL    RDW 14.1 11.5 - 14.5 %    Platelets 140 130 - 400 10*3/mm3    Neutrophil Rel % 47.9 37.0 - 80.0 %    Lymphocyte Rel % 39.9 10.0 - 50.0 %    Monocyte Rel % 8.1 0.0 - 12.0 %    Eosinophil Rel % 3.3 0.0 - 7.0 %    Basophil Rel % 0.5 0.0 - 2.5 %    Neutrophils Absolute 3.78 2.00 - 6.90 10*3/mm3    Lymphocytes Absolute 3.15 0.60 - 3.40 10*3/mm3    Monocytes Absolute 0.64 0.00 - 0.90 10*3/mm3    Eosinophils Absolute 0.26 0.00 - 0.70 10*3/mm3    Basophils Absolute 0.04 0.00 - 0.20 10*3/mm3    Immature Granulocyte Rel % 0.3 0.0 - 0.6 %    Immature Grans Absolute 0.02 0.00 - 0.06 10*3/mm3    nRBC 0.0 0.0 - 0.0 /100 WBC         Assessment/Plan       Gabriel was seen today for hypertension and med refill.    Diagnoses and all orders for this visit:    Coronary artery disease involving coronary bypass graft of native heart without angina pectoris    Essential hypertension    Other orders  -     metoprolol tartrate (LOPRESSOR) 50 MG tablet; Take 1 tablet by mouth 2 (Two) Times a Day.  -     telmisartan-hydrochlorothiazide (MICARDIS HCT) 80-25 MG per tablet; Take 1 tablet by mouth Daily.  -     atorvastatin (LIPITOR) 20 MG tablet; Take 1 tablet by mouth Daily.  -     B-12, Methylcobalamin, 1000 MCG sublingual tablet; Place 1 tablet under the tongue Daily.      Return in about 6 months (around 7/28/2019).          Patient Instructions   Get Hep a vac and Tdap.         Frank Torrez MD    Assessment/Plan

## 2019-07-30 ENCOUNTER — OFFICE VISIT (OUTPATIENT)
Dept: INTERNAL MEDICINE | Facility: CLINIC | Age: 79
End: 2019-07-30

## 2019-07-30 VITALS
HEIGHT: 66 IN | BODY MASS INDEX: 38.06 KG/M2 | OXYGEN SATURATION: 96 % | SYSTOLIC BLOOD PRESSURE: 130 MMHG | RESPIRATION RATE: 16 BRPM | WEIGHT: 236.8 LBS | DIASTOLIC BLOOD PRESSURE: 64 MMHG | TEMPERATURE: 98.1 F | HEART RATE: 67 BPM

## 2019-07-30 DIAGNOSIS — K21.9 GASTROESOPHAGEAL REFLUX DISEASE WITHOUT ESOPHAGITIS: ICD-10-CM

## 2019-07-30 DIAGNOSIS — R14.0 BLOATING: ICD-10-CM

## 2019-07-30 DIAGNOSIS — G47.33 OSA (OBSTRUCTIVE SLEEP APNEA): Primary | ICD-10-CM

## 2019-07-30 DIAGNOSIS — Z95.1 HX OF CABG: ICD-10-CM

## 2019-07-30 DIAGNOSIS — Z78.9 INTOLERANCE OF CONTINUOUS POSITIVE AIRWAY PRESSURE (CPAP) VENTILATION: ICD-10-CM

## 2019-07-30 DIAGNOSIS — Z00.00 ROUTINE GENERAL MEDICAL EXAMINATION AT A HEALTH CARE FACILITY: ICD-10-CM

## 2019-07-30 DIAGNOSIS — I25.10 CORONARY ARTERY DISEASE INVOLVING NATIVE CORONARY ARTERY OF NATIVE HEART WITHOUT ANGINA PECTORIS: ICD-10-CM

## 2019-07-30 LAB
ALBUMIN SERPL-MCNC: 4 G/DL (ref 3.5–5)
ALBUMIN/GLOB SERPL: 1.3 G/DL (ref 1–2)
ALP SERPL-CCNC: 83 U/L (ref 38–126)
ALT SERPL-CCNC: 30 U/L (ref 13–69)
AST SERPL-CCNC: 31 U/L (ref 15–46)
BASOPHILS # BLD AUTO: 0.04 10*3/MM3 (ref 0–0.2)
BASOPHILS NFR BLD AUTO: 0.5 % (ref 0–1.5)
BILIRUB SERPL-MCNC: 0.5 MG/DL (ref 0.2–1.3)
BUN SERPL-MCNC: 21 MG/DL (ref 7–20)
BUN/CREAT SERPL: 17.5 (ref 6.3–21.9)
CALCIUM SERPL-MCNC: 9.3 MG/DL (ref 8.4–10.2)
CHLORIDE SERPL-SCNC: 98 MMOL/L (ref 98–107)
CHOLEST SERPL-MCNC: 169 MG/DL (ref 0–199)
CO2 SERPL-SCNC: 31 MMOL/L (ref 26–30)
CREAT SERPL-MCNC: 1.2 MG/DL (ref 0.6–1.3)
EOSINOPHIL # BLD AUTO: 0.32 10*3/MM3 (ref 0–0.4)
EOSINOPHIL NFR BLD AUTO: 4.2 % (ref 0.3–6.2)
ERYTHROCYTE [DISTWIDTH] IN BLOOD BY AUTOMATED COUNT: 14.1 % (ref 12.3–15.4)
GLOBULIN SER CALC-MCNC: 3.1 GM/DL
GLUCOSE SERPL-MCNC: 92 MG/DL (ref 74–98)
HBA1C MFR BLD: 6.3 % (ref 4.8–5.6)
HCT VFR BLD AUTO: 43.3 % (ref 37.5–51)
HDLC SERPL-MCNC: 32 MG/DL (ref 40–60)
HGB BLD-MCNC: 14.3 G/DL (ref 13–17.7)
IMM GRANULOCYTES # BLD AUTO: 0.03 10*3/MM3 (ref 0–0.05)
IMM GRANULOCYTES NFR BLD AUTO: 0.4 % (ref 0–0.5)
LDLC SERPL CALC-MCNC: 76 MG/DL (ref 0–99)
LYMPHOCYTES # BLD AUTO: 2.49 10*3/MM3 (ref 0.7–3.1)
LYMPHOCYTES NFR BLD AUTO: 32.8 % (ref 19.6–45.3)
MCH RBC QN AUTO: 30.3 PG (ref 26.6–33)
MCHC RBC AUTO-ENTMCNC: 33 G/DL (ref 31.5–35.7)
MCV RBC AUTO: 91.7 FL (ref 79–97)
MONOCYTES # BLD AUTO: 0.77 10*3/MM3 (ref 0.1–0.9)
MONOCYTES NFR BLD AUTO: 10.1 % (ref 5–12)
NEUTROPHILS # BLD AUTO: 3.94 10*3/MM3 (ref 1.7–7)
NEUTROPHILS NFR BLD AUTO: 52 % (ref 42.7–76)
NRBC BLD AUTO-RTO: 0 /100 WBC (ref 0–0.2)
PLATELET # BLD AUTO: 154 10*3/MM3 (ref 140–450)
POTASSIUM SERPL-SCNC: 3.5 MMOL/L (ref 3.5–5.1)
PROT SERPL-MCNC: 7.1 G/DL (ref 6.3–8.2)
RBC # BLD AUTO: 4.72 10*6/MM3 (ref 4.14–5.8)
SODIUM SERPL-SCNC: 139 MMOL/L (ref 137–145)
T4 FREE SERPL-MCNC: 1.22 NG/DL (ref 0.78–2.19)
TRIGL SERPL-MCNC: 306 MG/DL
TSH SERPL DL<=0.005 MIU/L-ACNC: 3.76 MIU/ML (ref 0.47–4.68)
VIT B12 SERPL-MCNC: 904 PG/ML (ref 239–931)
VLDLC SERPL CALC-MCNC: 61.2 MG/DL
WBC # BLD AUTO: 7.59 10*3/MM3 (ref 3.4–10.8)

## 2019-07-30 PROCEDURE — 99213 OFFICE O/P EST LOW 20 MIN: CPT | Performed by: INTERNAL MEDICINE

## 2019-07-30 PROCEDURE — G0439 PPPS, SUBSEQ VISIT: HCPCS | Performed by: INTERNAL MEDICINE

## 2019-07-30 RX ORDER — CLINDAMYCIN HYDROCHLORIDE 300 MG/1
CAPSULE ORAL
Refills: 0 | COMMUNITY
Start: 2019-07-25 | End: 2019-08-13

## 2019-07-30 NOTE — PROGRESS NOTES
Subjective     Patient ID: Gabriel Conley Jr. is a 78 y.o. male. Patient is here for management of multiple medical problems.     Chief Complaint   Patient presents with   • Coronary Artery Disease     6 month follow-up   • Hypertension     6 month follow-up   • skin redness     patient has 2 red areas on right lower leg, that become painful on and off x 3 months     Coronary Artery Disease   Presents for follow-up visit. Pertinent negatives include no chest pain, chest pressure or chest tightness. Compliance with diet is variable. Compliance with exercise is variable. Compliance with medications is variable.   Hypertension   This is a chronic problem. The current episode started more than 1 year ago. The problem is unchanged. The problem is controlled. Pertinent negatives include no anxiety, blurred vision, chest pain or headaches. Risk factors for coronary artery disease include sedentary lifestyle, obesity, male gender and dyslipidemia. Current antihypertension treatment includes angiotensin blockers and diuretics. The current treatment provides moderate improvement. Hypertensive end-organ damage includes CAD/MI. There is no history of angina or kidney disease. There is no history of chronic renal disease, coarctation of the aorta, hyperaldosteronism or hypercortisolism.        Increased gerd and gas and bloating.  EGD 10 years ago.  HH and gerd.    Pt with christiano and refused cpap.  Tried cpap and couldn't breath.  In er for CP due to cpap.  Sleep upright in chair at night.   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  Started by Yocasta.        Recent root canal and on 2 abx. Infected area. No diarrhea.  Tolerating abx ok.   Pain in mouth.    Lower back pain started     The following portions of the patient's history were reviewed and updated as appropriate: allergies, current medications, past family history, past medical history, past social history, past surgical history and problem list.    Review of Systems    Constitutional: Positive for fatigue.   Eyes: Negative for blurred vision.   Respiratory: Negative for chest tightness.    Cardiovascular: Negative for chest pain.   Gastrointestinal: Positive for abdominal distention. Negative for abdominal pain, anal bleeding, blood in stool, constipation and diarrhea.   Neurological: Negative for headaches.   Psychiatric/Behavioral: Negative for dysphoric mood and sleep disturbance.   All other systems reviewed and are negative.      Current Outpatient Medications:   •  atorvastatin (LIPITOR) 20 MG tablet, Take 1 tablet by mouth Daily., Disp: 90 tablet, Rfl: 3  •  B-12, Methylcobalamin, 1000 MCG sublingual tablet, Place 1 tablet under the tongue Daily., Disp: 90 tablet, Rfl: 3  •  clindamycin (CLEOCIN) 300 MG capsule, TAKE 1 CAPSULE BY MOUTH THREE TIMES A DAY UNTIL GONE, Disp: , Rfl: 0  •  Coenzyme Q10 100 MG tablet, Take 200 mg by mouth Daily., Disp: , Rfl:   •  ELIQUIS 5 MG tablet tablet, Take 1 tablet by mouth 2 (Two) Times a Day., Disp: , Rfl: 1  •  furosemide (LASIX) 20 MG tablet, TAKE 1 TABLET BY MOUTH DAILY. NEEDS APPOINTMENT FOR ADDITIONAL REFILLS (Patient taking differently: Patient taking 3 times a week.), Disp: 30 tablet, Rfl: 11  •  melatonin 5 MG tablet tablet, Take 5 mg by mouth Every Night., Disp: , Rfl:   •  metoprolol tartrate (LOPRESSOR) 50 MG tablet, Take 1 tablet by mouth 2 (Two) Times a Day., Disp: 180 tablet, Rfl: 3  •  Misc Natural Products (OSTEO BI-FLEX ADV DOUBLE ST PO), Take 1 tablet by mouth 2 (Two) Times a Day., Disp: , Rfl:   •  Multiple Vitamins-Minerals (ICAPS AREDS 2) capsule, Take  by mouth 2 (Two) Times a Day., Disp: , Rfl:   •  nitroglycerin (NITROSTAT) 0.4 MG SL tablet, Place 0.4 mg under the tongue Every 5 (Five) Minutes As Needed., Disp: , Rfl:   •  Nutritional Supplements (ENSURE HIGH PROTEIN PO), Take 8 oz by mouth Every Morning., Disp: , Rfl:   •  Omeprazole (EQ OMEPRAZOLE) 20 MG tablet delayed-release, Take 20 mg by mouth Daily., Disp:  "90 each, Rfl: 3  •  potassium chloride (K-DUR,KLOR-CON) 20 MEQ CR tablet, Take 20 mEq by mouth Daily., Disp: , Rfl:   •  Psyllium (METAMUCIL) 30.9 % powder, Take 1 dose by mouth Daily., Disp: , Rfl:   •  telmisartan-hydrochlorothiazide (MICARDIS HCT) 80-25 MG per tablet, Take 1 tablet by mouth Daily., Disp: 90 tablet, Rfl: 3    Objective      Blood pressure 130/64, pulse 67, temperature 98.1 °F (36.7 °C), temperature source Oral, resp. rate 16, height 167.6 cm (66\"), weight 107 kg (236 lb 12.8 oz), SpO2 96 %.    Physical Exam     General Appearance:    Alert, cooperative, no distress, appears stated age   Head:    Normocephalic, without obvious abnormality, atraumatic   Eyes:    PERRL, conjunctiva/corneas clear, EOM's intact   Ears:    Normal TM's and external ear canals, both ears   Nose:   Nares normal, septum midline, mucosa normal, no drainage   or sinus tenderness   Throat:   Narrowed oral airway. Lips, mucosa, and tongue normal; teeth and gums normal   Neck:   Supple, symmetrical, trachea midline, no adenopathy;        thyroid:  No enlargement/tenderness/nodules; no carotid    bruit or JVD   Back:     Symmetric, no curvature, ROM normal, no CVA tenderness   Lungs:     Clear to auscultation bilaterally, respirations unlabored   Chest wall:    No tenderness or deformity   Heart:    Regular rate and rhythm, S1 and S2 normal, no murmur,        rub or gallop   Abdomen:     Soft, non-tender, bowel sounds active all four quadrants,     no masses, no organomegaly   Extremities:   Extremities normal, atraumatic, no cyanosis, +2 edema   Pulses:   2+ and symmetric all extremities   Skin:   Skin color, texture, turgor normal, no rashes or lesions   Lymph nodes:   Cervical, supraclavicular, and axillary nodes normal   Neurologic:   CNII-XII intact. Normal strength, sensation and reflexes       throughout      Results for orders placed or performed in visit on 01/28/19   Lipid Panel   Result Value Ref Range    Total " Cholesterol 169 0 - 199 mg/dL    Triglycerides 306 (H) <150 mg/dL    HDL Cholesterol 32 (L) 40 - 60 mg/dL    VLDL Cholesterol 61.2 mg/dL    LDL Cholesterol  76 0 - 99 mg/dL   Vitamin B12   Result Value Ref Range    Vitamin B-12 904 239 - 931 pg/mL   Comprehensive Metabolic Panel   Result Value Ref Range    Glucose 92 74 - 98 mg/dL    BUN 21 (H) 7 - 20 mg/dL    Creatinine 1.20 0.60 - 1.30 mg/dL    eGFR Non African Am 59 (L) >60 mL/min/1.73    eGFR African Am 71 >60 mL/min/1.73    BUN/Creatinine Ratio 17.5 6.3 - 21.9    Sodium 139 137 - 145 mmol/L    Potassium 3.5 3.5 - 5.1 mmol/L    Chloride 98 98 - 107 mmol/L    Total CO2 31.0 (H) 26.0 - 30.0 mmol/L    Calcium 9.3 8.4 - 10.2 mg/dL    Total Protein 7.1 6.3 - 8.2 g/dL    Albumin 4.00 3.50 - 5.00 g/dL    Globulin 3.1 gm/dL    A/G Ratio 1.3 1.0 - 2.0 g/dL    Total Bilirubin 0.5 0.2 - 1.3 mg/dL    Alkaline Phosphatase 83 38 - 126 U/L    AST (SGOT) 31 15 - 46 U/L    ALT (SGPT) 30 13 - 69 U/L   TSH   Result Value Ref Range    TSH 3.760 0.470 - 4.680 mIU/mL   T4, Free   Result Value Ref Range    Free T4 1.22 0.78 - 2.19 ng/dL   Hemoglobin A1c   Result Value Ref Range    Hemoglobin A1C 6.30 (H) 4.80 - 5.60 %   CBC & Differential   Result Value Ref Range    WBC 7.59 3.40 - 10.80 10*3/mm3    RBC 4.72 4.14 - 5.80 10*6/mm3    Hemoglobin 14.3 13.0 - 17.7 g/dL    Hematocrit 43.3 37.5 - 51.0 %    MCV 91.7 79.0 - 97.0 fL    MCH 30.3 26.6 - 33.0 pg    MCHC 33.0 31.5 - 35.7 g/dL    RDW 14.1 12.3 - 15.4 %    Platelets 154 140 - 450 10*3/mm3    Neutrophil Rel % 52.0 42.7 - 76.0 %    Lymphocyte Rel % 32.8 19.6 - 45.3 %    Monocyte Rel % 10.1 5.0 - 12.0 %    Eosinophil Rel % 4.2 0.3 - 6.2 %    Basophil Rel % 0.5 0.0 - 1.5 %    Neutrophils Absolute 3.94 1.70 - 7.00 10*3/mm3    Lymphocytes Absolute 2.49 0.70 - 3.10 10*3/mm3    Monocytes Absolute 0.77 0.10 - 0.90 10*3/mm3    Eosinophils Absolute 0.32 0.00 - 0.40 10*3/mm3    Basophils Absolute 0.04 0.00 - 0.20 10*3/mm3    Immature Granulocyte  Rel % 0.4 0.0 - 0.5 %    Immature Grans Absolute 0.03 0.00 - 0.05 10*3/mm3    nRBC 0.0 0.0 - 0.2 /100 WBC         Assessment/Plan       Gabriel was seen today for coronary artery disease, hypertension and skin redness.    Diagnoses and all orders for this visit:    RORY (obstructive sleep apnea)  -     Ambulatory Referral to Pulmonology    Intolerance of continuous positive airway pressure (CPAP) ventilation  -     Ambulatory Referral to Pulmonology    Hx of CABG  -     Ambulatory Referral to Cardiology    Coronary artery disease involving native coronary artery of native heart without angina pectoris  -     Ambulatory Referral to Cardiology    Routine general medical examination at a [facility] facility    Gastroesophageal reflux disease without esophagitis    Bloating    pt on PPI.   egd 10 years ago. Will start with cardiology eval first. If problem persist consider egd.    Return in about 6 weeks (around 9/10/2019).          There are no Patient Instructions on file for this visit.     Frank Torrez MD    Assessment/Plan

## 2019-07-30 NOTE — PROGRESS NOTES
QUICK REFERENCE INFORMATION:  The ABCs of the Annual Wellness Visit    Medicare Annual Wellness Visit    Subjective   History of Present Illness    Gabriel Conley Jr. is a 78 y.o. male who presents for an Annual Wellness Visit. In addition, we addressed the following health issues:cabg and christiano with intolerance to cpap.        PMH, PSH, SocHx, FamHx, Allergies, and Medications: Reviewed and updated.     Outpatient Medications Prior to Visit   Medication Sig Dispense Refill   • atorvastatin (LIPITOR) 20 MG tablet Take 1 tablet by mouth Daily. 90 tablet 3   • B-12, Methylcobalamin, 1000 MCG sublingual tablet Place 1 tablet under the tongue Daily. 90 tablet 3   • clindamycin (CLEOCIN) 300 MG capsule TAKE 1 CAPSULE BY MOUTH THREE TIMES A DAY UNTIL GONE  0   • Coenzyme Q10 100 MG tablet Take 200 mg by mouth Daily.     • ELIQUIS 5 MG tablet tablet Take 1 tablet by mouth 2 (Two) Times a Day.  1   • furosemide (LASIX) 20 MG tablet TAKE 1 TABLET BY MOUTH DAILY. NEEDS APPOINTMENT FOR ADDITIONAL REFILLS (Patient taking differently: Patient taking 3 times a week.) 30 tablet 11   • melatonin 5 MG tablet tablet Take 5 mg by mouth Every Night.     • metoprolol tartrate (LOPRESSOR) 50 MG tablet Take 1 tablet by mouth 2 (Two) Times a Day. 180 tablet 3   • Misc Natural Products (OSTEO BI-FLEX ADV DOUBLE ST PO) Take 1 tablet by mouth 2 (Two) Times a Day.     • Multiple Vitamins-Minerals (ICAPS AREDS 2) capsule Take  by mouth 2 (Two) Times a Day.     • nitroglycerin (NITROSTAT) 0.4 MG SL tablet Place 0.4 mg under the tongue Every 5 (Five) Minutes As Needed.     • Nutritional Supplements (ENSURE HIGH PROTEIN PO) Take 8 oz by mouth Every Morning.     • Omeprazole (EQ OMEPRAZOLE) 20 MG tablet delayed-release Take 20 mg by mouth Daily. 90 each 3   • potassium chloride (K-DUR,KLOR-CON) 20 MEQ CR tablet Take 20 mEq by mouth Daily.     • Psyllium (METAMUCIL) 30.9 % powder Take 1 dose by mouth Daily.     • telmisartan-hydrochlorothiazide  (MICARDIS HCT) 80-25 MG per tablet Take 1 tablet by mouth Daily. 90 tablet 3   • B-12, Methylcobalamin, 1000 MCG sublingual tablet Place 1 tablet under the tongue Daily. 90 tablet 3     No facility-administered medications prior to visit.        Patient Active Problem List   Diagnosis   • Angina pectoris (CMS/HCC)   • Coronary artery disease   • Dyslipidemia   • Elevated fasting glucose   • Encounter for long-term (current) use of medications   • Hernia, hiatal   • Hypertension   • Melena   • Prediabetes   • BMI 36.0-36.9,adult   • Vitamin D deficiency   • Encounter for special screening examination for neoplasm of prostate   • Screening for colon cancer       Health Habits:  Dental Exam. up to date  Eye Exam. up to date  No exam data present  Exercise: 4 times/week.  Current exercise activities include: walking    Health Risk Assessment:  The patient has completed a Health Risk Assessment. This has been reviewed with them and has been scanned into the patient's chart.    Current Medical Providers:  Patient Care Team:  Frank Torrez MD as PCP - General (Internal Medicine)  Frank Torrez MD as PCP - [facility] Attributed  Ifeanyi Conley MD as Consulting Physician (General Surgery)    The [facility] providers who are involved in the care of this patient are listed above. Additional providers and suppliers are listed below:  Dr Maximino Rust.      Recent Hospitalizations:  No hospitalization(s) within the last year..    Recent Lab Results:  CMP:  Lab Results   Component Value Date    GLU 89 12/17/2018    BUN 22 (H) 12/17/2018    CREATININE 1.20 12/17/2018    EGFRIFNONA 59 (L) 12/17/2018    EGFRIFAFRI 71 12/17/2018    BCR 18.3 12/17/2018     12/17/2018    K 3.6 12/17/2018    CO2 28.0 12/17/2018    CALCIUM 9.1 12/17/2018    PROTENTOTREF 6.9 12/17/2018    ALBUMIN 4.20 12/17/2018    LABGLOBREF 2.7 12/17/2018    LABIL2 1.6 12/17/2018    BILITOT 0.6 12/17/2018    ALKPHOS 76 12/17/2018    AST 27  12/17/2018    ALT 36 12/17/2018       LIPID PANEL:  Lab Results   Component Value Date    CHLPL 149 12/17/2018    TRIG 221 (H) 12/17/2018    HDL 34 (L) 12/17/2018    VLDL 44.2 12/17/2018    LDL 71 12/17/2018       HbA1c:  Lab Results   Component Value Date    HGBA1C 5.70 09/15/2016       URINE MICROALBUMIN:  No results found for: MICROALBUR, POCMALB    PSA:  Lab Results   Component Value Date    PSA 5.030 (H) 03/08/2018       Age-appropriate Screening Schedule:  Refer to the list below for future screening recommendations based on patient's age, sex and/or medical conditions. Orders for these recommended tests are listed in the plan section. The patient has been provided with a written plan.    Health Maintenance   Topic Date Due   • TDAP/TD VACCINES (1 - Tdap) [birth year]/06/1959   • ZOSTER VACCINE (2 of 3) 02/13/2018   • INFLUENZA VACCINE  08/01/2019   • PNEUMOCOCCAL VACCINES (65+ LOW/MEDIUM RISK) (2 of 2 - PPSV23) 09/25/2019   • COLONOSCOPY  03/13/2028       Depression Screen:   PHQ-2/PHQ-9 Depression Screening 7/30/2019   Little interest or pleasure in doing things 0   Feeling down, depressed, or hopeless 0   Total Score 0         Functional and Cognitive Screening:  Functional & Cognitive Status 7/30/2019   Do you have difficulty preparing food and eating? No   Do you have difficulty bathing yourself, getting dressed or grooming yourself? No   Do you have difficulty using the toilet? No   Do you have difficulty moving around from place to place? No   Do you have trouble with steps or getting out of a bed or a chair? No   Current Diet Unhealthy Diet   Dental Exam Up to date   Eye Exam Up to date   Exercise (times per week) 3 times per week   Current Exercise Activities Include Walking   Do you need help using the phone?  No   Are you deaf or do you have serious difficulty hearing?  No   Do you need help with transportation? No   Do you need help shopping? No   Do you need help preparing meals?  No   Do you need help with  "housework?  No   Do you need help with laundry? No   Do you need help taking your medications? No   Do you need help managing money? No   Do you ever drive or ride in a car without wearing a seat belt? No   Have you felt unusual stress, anger or loneliness in the last month? No   Who do you live with? Spouse   If you need help, do you have trouble finding someone available to you? No   Have you been bothered in the last four weeks by sexual problems? Yes   Do you have difficulty concentrating, remembering or making decisions? No       Does the patient have evidence of cognitive impairment? No    Advanced Care Planning:  Patient has an advance directive - a copy has been provided and is visible in patient header    Identification of Risk Factors:  Risk factors include: Advance Directive Discussion  Cardiovascular risk  Chronic Pain   Fall Risk  Obesity/Overweight .    Review of Systems    Compared to one year ago, the patient feels his physical health is better.  Compared to one year ago, the patient feels his mental health is better.    Objective     Physical Exam    Vitals:    07/30/19 0836   BP: 130/64   BP Location: Left arm   Patient Position: Sitting   Cuff Size: Large Adult   Pulse: 67   Resp: 16   Temp: 98.1 °F (36.7 °C)   TempSrc: Oral   SpO2: 96%   Weight: 107 kg (236 lb 12.8 oz)   Height: 167.6 cm (66\")       Body mass index is 38.22 kg/m².  Discussed the patient's BMI with him. The BMI is above average; BMI management plan is completed.    Assessment/Plan   Patient Self-Management and Personalized Health Advice  The patient has been provided with information about: diet, exercise, weight management and fall prevention and preventive services including:   · Cardiovascular Disease Screening Tests (may do this order every 5 years in beneficiaries without signs or symptoms of cardiovascular disease)  · Diabetes Screening-Lab Order for either glucose quantitative blood (except reagent strip), glucose;post " glucose dose(includes glucose), or glucose tolerance test-3 specimens(includes glucose)  · Prostate Cancer Screening .    Visit Diagnoses:    ICD-10-CM ICD-9-CM   1. RORY (obstructive sleep apnea) G47.33 327.23   2. Intolerance of continuous positive airway pressure (CPAP) ventilation Z78.9 V49.89   3. Hx of CABG Z95.1 V45.81   4. Coronary artery disease involving native coronary artery of native heart without angina pectoris I25.10 414.01   5. Routine general medical examination at a health care facility Z00.00 V70.0       Orders Placed This Encounter   Procedures   • Ambulatory Referral to Pulmonology     Referral Priority:   Routine     Referral Type:   Consultation     Referral Reason:   Specialty Services Required     Referred to Provider:   Paty Hutchins MD     Requested Specialty:   Pulmonary Disease     Number of Visits Requested:   1   • Ambulatory Referral to Cardiology     Referral Priority:   Routine     Referral Type:   Consultation     Referral Reason:   Specialty Services Required     Referred to Provider:   Alan Savage MD     Requested Specialty:   Cardiology     Number of Visits Requested:   1       Outpatient Encounter Medications as of 7/30/2019   Medication Sig Dispense Refill   • atorvastatin (LIPITOR) 20 MG tablet Take 1 tablet by mouth Daily. 90 tablet 3   • B-12, Methylcobalamin, 1000 MCG sublingual tablet Place 1 tablet under the tongue Daily. 90 tablet 3   • clindamycin (CLEOCIN) 300 MG capsule TAKE 1 CAPSULE BY MOUTH THREE TIMES A DAY UNTIL GONE  0   • Coenzyme Q10 100 MG tablet Take 200 mg by mouth Daily.     • ELIQUIS 5 MG tablet tablet Take 1 tablet by mouth 2 (Two) Times a Day.  1   • furosemide (LASIX) 20 MG tablet TAKE 1 TABLET BY MOUTH DAILY. NEEDS APPOINTMENT FOR ADDITIONAL REFILLS (Patient taking differently: Patient taking 3 times a week.) 30 tablet 11   • melatonin 5 MG tablet tablet Take 5 mg by mouth Every Night.     • metoprolol tartrate (LOPRESSOR) 50 MG tablet  Take 1 tablet by mouth 2 (Two) Times a Day. 180 tablet 3   • Misc Natural Products (OSTEO BI-FLEX ADV DOUBLE ST PO) Take 1 tablet by mouth 2 (Two) Times a Day.     • Multiple Vitamins-Minerals (ICAPS AREDS 2) capsule Take  by mouth 2 (Two) Times a Day.     • nitroglycerin (NITROSTAT) 0.4 MG SL tablet Place 0.4 mg under the tongue Every 5 (Five) Minutes As Needed.     • Nutritional Supplements (ENSURE HIGH PROTEIN PO) Take 8 oz by mouth Every Morning.     • Omeprazole (EQ OMEPRAZOLE) 20 MG tablet delayed-release Take 20 mg by mouth Daily. 90 each 3   • potassium chloride (K-DUR,KLOR-CON) 20 MEQ CR tablet Take 20 mEq by mouth Daily.     • Psyllium (METAMUCIL) 30.9 % powder Take 1 dose by mouth Daily.     • telmisartan-hydrochlorothiazide (MICARDIS HCT) 80-25 MG per tablet Take 1 tablet by mouth Daily. 90 tablet 3   • [DISCONTINUED] B-12, Methylcobalamin, 1000 MCG sublingual tablet Place 1 tablet under the tongue Daily. 90 tablet 3     No facility-administered encounter medications on file as of 7/30/2019.        Reviewed use of high risk medication in the elderly: yes  Reviewed for potential of harmful drug interactions in the elderly: yes    Follow Up:  Return in about 6 weeks (around 9/10/2019).     An After Visit Summary and PPPS with all of these plans were given to the patient.             Gabriel was seen today for coronary artery disease, hypertension and skin redness.    Diagnoses and all orders for this visit:    Routine general medical examination at a health care facility

## 2019-08-12 NOTE — PROGRESS NOTES
[facility] Cardiology OP Consult Note    Gabriel Conley Jr.  [id number]  08/13/2019    Referred By: Frank Torrez MD    Chief Complaint: Reestablish cardiac care    History of Present Illness:   Mr. Gabriel Conley Jr. is a 78 y.o. male who presents to the Cardiology Clinic to re-establish cardiac care.  Mr. Conley has a past medical history which is significant for hypertension, dyslipidemia, chronic kidney disease, and GERD his past medical history is also significant for peripheral vascular disease, with prior left subclavian artery stenting in 2013.  He has a past cardiac history which is significant for suspected persistent atrial fibrillation and multivessel coronary artery disease reportedly diagnosed on coronary angiography in 2003 in [place].  At that time, he subsequently underwent a four-vessel CABG.  On review of records, he has previously followed by cardiology and it appears his last MPS was in 2008 with no evidence of ischemia at that time.  He presents to the cardiology clinic today to re-establish his primary cardiac care.  Currently, Mr. Conley denies any significant exertional chest pain or discomfort.  He reports his primary symptom leading up to his CABG was exertional dyspnea, which was significantly improved following bypass surgery.  He then had left subclavian stenosis, at which time he began experiencing shortness of breath in the setting of a LIMA graft.  Following subclavian stenting, he again reports resolution of his exertional dyspnea.  Currently, the patient is active and frequently [place].  During exertion, denies any exertional chest discomfort, exertional dyspnea, or anginal symptoms.  He does note one episode of brief substernal chest pain, which appeared to be more muscular in character.  The chest discomfort resolved spontaneously, and has not recurred.  He denies any orthopnea, lower extremity edema, or PND.  In terms of his atrial  "fibrillation, he denies any significant palpitations, presyncopal, or syncopal events.  He continues to tolerate Eliquis without any significant bleeding or bruising episodes.  He reports recently increasing his atorvastatin to 40 mg daily, and has tolerated the increased satin therapy well.  He has no specific complaints at this time.    Past Cardiac Testin. Last Coronary Angio:  in [place], revealed multivessel disease  2. Prior Stress Testing:    -MPS : Reportedly unremarkable with normal perfusion, LVEF 65%  3. Last Echo: Reportedly    -LVEF 55 to 60%   -RVSP 37 mmHg   -No significant valvular disease  4. Prior Holter Monitor: None    Review of Systems:   Review of Systems   Constitutional: Negative for activity change, appetite change, chills, diaphoresis, fatigue, fever, unexpected weight gain and unexpected weight loss.   Respiratory: Negative for cough, chest tightness, shortness of breath and wheezing.    Cardiovascular: Negative for chest pain, palpitations and leg swelling.   Gastrointestinal: Negative for abdominal pain, anal bleeding, blood in stool and GERD.   Endocrine: Negative for cold intolerance and heat intolerance.   Genitourinary: Negative for hematuria.   Neurological: Negative for dizziness, syncope, weakness and light-headedness.   Hematological: Does not bruise/bleed easily.   Psychiatric/Behavioral: Negative for depressed mood and stress. The patient is not nervous/anxious.        Past Medical History:   Past Medical History:   Diagnosis Date   • Acid reflux    • Acid reflux    • Arthritis    • Atrial fibrillation (CMS/HCC)    • Cataract     BILATERAL   • Coronary artery disease    • Glaucoma    • Heart disease    • Heart murmur    • History of nuclear stress test     DR. AMBROSE-\"IT WAS OK\"   • [place] (hard of hearing)    • Kidney stone    • Macular degeneration    • Melena 2016   • Sleep apnea     NO CPAP   • Wears glasses        Past Surgical History:   Past " Surgical History:   Procedure Laterality Date   • CARDIAC CATHETERIZATION      X2   • CARDIAC SURGERY     • CATARACT EXTRACTION Bilateral    • COLONOSCOPY  2000   • COLONOSCOPY N/A 3/13/2018    Procedure: COLONOSCOPY WITH POLYPECTOMY;  Surgeon: Ifeanyi Conley MD;  Location: [facility] ENDOSCOPY;  Service: Gastroenterology   • CORONARY ARTERY BYPASS GRAFT  2002     Coronary Artery Triple Arterial Bypass Graft   • CORONARY STENT PLACEMENT      X2   • ENDOSCOPY     • INGUINAL HERNIA REPAIR Left    • ROOT CANAL  09/22/2019       Family History:   Family History   Problem Relation Age of Onset   • Stroke Other    • Hypertension Other    • Diabetes Other    • Arthritis Other    • Heart attack Other    • Hypertension Mother    • Cancer Mother    • Arthritis Mother    • Heart attack Mother    • Stroke Father        Social History:   Social History     Socioeconomic History   • Marital status:      Spouse name: Not on file   • Number of children: Not on file   • Years of education: Not on file   • Highest education level: Not on file   Tobacco Use   • Smoking status: Never Smoker   • Smokeless tobacco: Never Used   Substance and Sexual Activity   • Alcohol use: No   • Drug use: No   • Sexual activity: Defer       Medications:     Current Outpatient Medications:   •  atorvastatin (LIPITOR) 20 MG tablet, Take 2 tablets by mouth Daily., Disp: 90 tablet, Rfl: 5  •  B-12, Methylcobalamin, 1000 MCG sublingual tablet, Place 1 tablet under the tongue Daily., Disp: 90 tablet, Rfl: 3  •  cholecalciferol (VITAMIN D3) 1000 units tablet, Take 1,000 Units by mouth Daily., Disp: , Rfl:   •  Coenzyme Q10 100 MG tablet, Take 200 mg by mouth Daily., Disp: , Rfl:   •  ELIQUIS 5 MG tablet tablet, Take 1 tablet by mouth 2 (Two) Times a Day., Disp: , Rfl: 1  •  fenofibrate (TRICOR) 145 MG tablet, Take 145 mg by mouth Daily., Disp: , Rfl:   •  furosemide (LASIX) 20 MG tablet, TAKE 1 TABLET BY MOUTH DAILY. NEEDS APPOINTMENT FOR ADDITIONAL  "REFILLS (Patient taking differently: Patient taking 3 times a week.), Disp: 30 tablet, Rfl: 11  •  melatonin 5 MG tablet tablet, Take 5 mg by mouth Every Night., Disp: , Rfl:   •  metoprolol tartrate (LOPRESSOR) 50 MG tablet, Take 1 tablet by mouth 2 (Two) Times a Day., Disp: 180 tablet, Rfl: 3  •  Misc Natural Products (OSTEO BI-FLEX ADV DOUBLE ST PO), Take 1 tablet by mouth 2 (Two) Times a Day., Disp: , Rfl:   •  nitroglycerin (NITROSTAT) 0.4 MG SL tablet, Place 0.4 mg under the tongue Every 5 (Five) Minutes As Needed., Disp: , Rfl:   •  Nutritional Supplements (ENSURE HIGH PROTEIN PO), Take 8 oz by mouth Every Morning., Disp: , Rfl:   •  Omeprazole (EQ OMEPRAZOLE) 20 MG tablet delayed-release, Take 20 mg by mouth Daily., Disp: 90 each, Rfl: 3  •  Psyllium (METAMUCIL) 30.9 % powder, Take 1 dose by mouth Daily., Disp: , Rfl:   •  telmisartan-hydrochlorothiazide (MICARDIS HCT) 80-25 MG per tablet, Take 1 tablet by mouth Daily., Disp: 90 tablet, Rfl: 3  •  potassium chloride (K-DUR,KLOR-CON) 20 MEQ CR tablet, Take 20 mEq by mouth Daily., Disp: , Rfl:     Allergies:   Allergies   Allergen Reactions   • Hydrocodone Delirium     NAUSEA AND VOMITING     • Xarelto [Rivaroxaban] Other (See Comments)     Bleeding gums       Physical Exam:  Vital Signs:   Vitals:    08/13/19 0916 08/13/19 0930   BP: 128/70 124/76   BP Location: Right arm Left arm   Patient Position: Sitting Sitting   Cuff Size: Adult Adult   Pulse: 67    SpO2: 97%    Weight: 105 kg (231 lb 6.4 oz)    Height: 167.6 cm (66\")        Physical Exam   Constitutional: He is oriented to person, place, and time. He appears well-developed. No distress.   Obese.   HENT:   Head: Normocephalic and atraumatic.   Moist Mucous Membranes.    Eyes: EOM are normal. Pupils are equal, round, and reactive to light. No scleral icterus.   Neck: No tracheal deviation present.   Cardiovascular: Normal rate, regular rhythm, normal heart sounds and intact distal pulses. Exam reveals no " gallop and no friction rub.   No murmur heard.  Normal JVD.   No lower extremity edema bilateral.      Pulmonary/Chest: Effort normal and breath sounds normal. No stridor. No respiratory distress. He has no wheezes. He has no rales. He exhibits no tenderness.   Abdominal: Soft. Bowel sounds are normal. He exhibits no distension. There is no tenderness. There is no rebound and no guarding.   Obese abdomen.   Musculoskeletal: Normal range of motion.   Trace bilateral lower extremity edema.   Lymphadenopathy:     He has no cervical adenopathy.   Neurological: He is alert and oriented to person, place, and time.   Skin: Skin is warm and dry. He is not diaphoretic. No erythema.   Psychiatric: He has a normal mood and affect. His behavior is normal.   Nursing note and vitals reviewed.      Results Review:   I reviewed the patient's new clinical results.   Labs reviewed from 7/30/2019:  1.  CMP- creatinine 1.2, GFR 59, sodium 139, potassium 3.5  2.  CBC- WBC 7.59, hemoglobin 14.3, platelets 154  3.  Lipid profile- total cholesterol 169, triglycerides 306, HDL 32, LDL 76  4.  Hemoglobin A1c- 6.3%      ECG 12 Lead  Date/Time: 8/13/2019 11:38 AM  Performed by: Alan Savage MD  Authorized by: Alan Savage MD   Comparison: compared with previous ECG from 6/29/2017  Comparison to previous ECG: No significant change.  Rhythm comments: Atrial fibrillation with a ventricular rate of 64 bpm.  Rate: normal  QRS axis: normal  Other findings: non-specific ST-T wave changes and low voltage    Clinical impression: abnormal EKG        Assessment / Plan:   Gabriel was seen today for advice only and coronary artery disease.    Diagnoses and all orders for this visit:    1.  Multivessel coronary artery disease  --Status post four-vessel CABG in 2003  --Currently active without anginal symptoms   --Given his last echocardiogram was in 2011, will repeat transthoracic echocardiogram to reevaluate LV systolic function  --Continue high  intensity statin therapy with atorvastatin 40 mg daily  --Will discuss reinitiation of daily aspirin at next follow-up visit  --Will schedule follow-up routine in 6 months    2.  Permanent atrial fibrillation  --Ventricular rate currently controlled  --Continue metoprolol for rate control  --Continue chronic anticoagulation with Eliquis for CVA prophylaxis    3.  Essential hypertension  --BP currently well controlled with current antihypertensives    4.  Dyslipidemia  --continue high intensity statin    5.  BMI 36.0-36.9,adult  --Encouraged continued weight loss through diet and exercise      Preventative Cardiology:   Tobacco Cessation: N/A  Obstructive Sleep Apnea Screening: Deffered  AAA Screening: N/A  Peripheral Arterial Disease Screening: N/A    Follow Up:   Return in about 6 months (around 2/13/2020).    Thank you for allowing me to participate in the care of your patient. Please to not hesitate to contact me with additional questions or concerns.     Please note, this document was produced using voice recognition software.     GIL Savage MD  Interventional Cardiology   08/13/2019  11:46 AM

## 2019-08-13 ENCOUNTER — CONSULT (OUTPATIENT)
Dept: CARDIOLOGY | Facility: CLINIC | Age: 79
End: 2019-08-13

## 2019-08-13 VITALS
OXYGEN SATURATION: 97 % | SYSTOLIC BLOOD PRESSURE: 124 MMHG | HEART RATE: 67 BPM | HEIGHT: 66 IN | DIASTOLIC BLOOD PRESSURE: 76 MMHG | BODY MASS INDEX: 37.19 KG/M2 | WEIGHT: 231.4 LBS

## 2019-08-13 DIAGNOSIS — I25.810 CORONARY ARTERY DISEASE INVOLVING CORONARY BYPASS GRAFT OF NATIVE HEART WITHOUT ANGINA PECTORIS: Primary | ICD-10-CM

## 2019-08-13 DIAGNOSIS — E78.5 DYSLIPIDEMIA: ICD-10-CM

## 2019-08-13 DIAGNOSIS — I48.21 PERMANENT ATRIAL FIBRILLATION (HCC): ICD-10-CM

## 2019-08-13 DIAGNOSIS — I10 ESSENTIAL HYPERTENSION: ICD-10-CM

## 2019-08-13 PROBLEM — I48.19 ATRIAL FIBRILLATION, PERSISTENT: Status: ACTIVE | Noted: 2019-08-13

## 2019-08-13 PROCEDURE — 93000 ELECTROCARDIOGRAM COMPLETE: CPT | Performed by: INTERNAL MEDICINE

## 2019-08-13 PROCEDURE — 99204 OFFICE O/P NEW MOD 45 MIN: CPT | Performed by: INTERNAL MEDICINE

## 2019-08-13 RX ORDER — OMEPRAZOLE 20 MG/1
20 CAPSULE, DELAYED RELEASE ORAL DAILY
Refills: 3 | COMMUNITY
Start: 2019-06-07 | End: 2019-08-13

## 2019-08-13 RX ORDER — MELATONIN
1000 DAILY
COMMUNITY

## 2019-08-13 RX ORDER — FENOFIBRATE 145 MG/1
145 TABLET, COATED ORAL DAILY
COMMUNITY
End: 2020-10-07 | Stop reason: ALTCHOICE

## 2019-08-13 RX ORDER — ATORVASTATIN CALCIUM 20 MG/1
40 TABLET, FILM COATED ORAL DAILY
Qty: 90 TABLET | Refills: 5 | Status: SHIPPED | OUTPATIENT
Start: 2019-08-13 | End: 2019-08-27 | Stop reason: ALTCHOICE

## 2019-08-27 ENCOUNTER — TELEPHONE (OUTPATIENT)
Dept: CARDIOLOGY | Facility: CLINIC | Age: 79
End: 2019-08-27

## 2019-08-27 DIAGNOSIS — E78.5 HYPERLIPIDEMIA, UNSPECIFIED HYPERLIPIDEMIA TYPE: Primary | ICD-10-CM

## 2019-08-27 RX ORDER — ATORVASTATIN CALCIUM 40 MG/1
40 TABLET, FILM COATED ORAL DAILY
Qty: 90 TABLET | Refills: 3 | Status: SHIPPED | OUTPATIENT
Start: 2019-08-27 | End: 2019-08-27 | Stop reason: SDUPTHER

## 2019-08-27 RX ORDER — ATORVASTATIN CALCIUM 40 MG/1
40 TABLET, FILM COATED ORAL DAILY
Qty: 90 TABLET | Refills: 3 | Status: SHIPPED | OUTPATIENT
Start: 2019-08-27 | End: 2020-02-14

## 2019-08-27 NOTE — TELEPHONE ENCOUNTER
Refill on Atorvastatin resent in for 40 mg 90/3 refills to [facility]  [address] [place]ott [place]

## 2019-08-27 NOTE — TELEPHONE ENCOUNTER
----- Message from Alan Savage MD sent at 8/26/2019  4:16 PM EDT -----  Please let Mr león know his echocardiogram looked good with no significant abnormalities. We will see him back as previously scheduled.     Thanks.

## 2019-09-11 ENCOUNTER — OFFICE VISIT (OUTPATIENT)
Dept: INTERNAL MEDICINE | Facility: CLINIC | Age: 79
End: 2019-09-11

## 2019-09-11 VITALS
BODY MASS INDEX: 36.64 KG/M2 | HEART RATE: 55 BPM | HEIGHT: 66 IN | RESPIRATION RATE: 16 BRPM | SYSTOLIC BLOOD PRESSURE: 95 MMHG | TEMPERATURE: 98.1 F | WEIGHT: 228 LBS | DIASTOLIC BLOOD PRESSURE: 48 MMHG | OXYGEN SATURATION: 97 %

## 2019-09-11 DIAGNOSIS — E11.59 TYPE 2 DIABETES MELLITUS WITH OTHER CIRCULATORY COMPLICATION, WITHOUT LONG-TERM CURRENT USE OF INSULIN (HCC): ICD-10-CM

## 2019-09-11 DIAGNOSIS — H35.3211 EXUDATIVE AGE-RELATED MACULAR DEGENERATION OF RIGHT EYE WITH ACTIVE CHOROIDAL NEOVASCULARIZATION (HCC): ICD-10-CM

## 2019-09-11 DIAGNOSIS — Z00.00 ROUTINE GENERAL MEDICAL EXAMINATION AT A HEALTH CARE FACILITY: Primary | ICD-10-CM

## 2019-09-11 DIAGNOSIS — I25.810 CORONARY ARTERY DISEASE INVOLVING CORONARY BYPASS GRAFT OF NATIVE HEART WITHOUT ANGINA PECTORIS: ICD-10-CM

## 2019-09-11 DIAGNOSIS — I48.21 PERMANENT ATRIAL FIBRILLATION (HCC): ICD-10-CM

## 2019-09-11 DIAGNOSIS — I20.9 ANGINA PECTORIS (HCC): ICD-10-CM

## 2019-09-11 DIAGNOSIS — E66.01 MORBIDLY OBESE (HCC): ICD-10-CM

## 2019-09-11 DIAGNOSIS — G47.34 NOCTURNAL HYPOXIA: ICD-10-CM

## 2019-09-11 PROBLEM — E11.9 DIABETES MELLITUS: Status: ACTIVE | Noted: 2019-09-11

## 2019-09-11 PROCEDURE — 99214 OFFICE O/P EST MOD 30 MIN: CPT | Performed by: INTERNAL MEDICINE

## 2019-09-11 NOTE — PROGRESS NOTES
QUICK REFERENCE INFORMATION:  The ABCs of the Annual Wellness Visit    Medicare Annual Wellness Visit    Subjective   History of Present Illness    Gabriel Conley Jr. is a 78 y.o. male who presents for an Annual Wellness Visit. In addition, we addressed the following health issues:low o2 at night.    Chronic pain. None to report.          PMH, PSH, SocHx, FamHx, Allergies, and Medications: Reviewed and updated.     Outpatient Medications Prior to Visit   Medication Sig Dispense Refill   • atorvastatin (LIPITOR) 40 MG tablet Take 1 tablet by mouth Daily. 90 tablet 3   • B-12, Methylcobalamin, 1000 MCG sublingual tablet Place 1 tablet under the tongue Daily. 90 tablet 3   • cholecalciferol (VITAMIN D3) 1000 units tablet Take 1,000 Units by mouth Daily.     • Coenzyme Q10 100 MG tablet Take 200 mg by mouth Daily.     • ELIQUIS 5 MG tablet tablet Take 1 tablet by mouth 2 (Two) Times a Day.  1   • fenofibrate (TRICOR) 145 MG tablet Take 145 mg by mouth Daily.     • furosemide (LASIX) 20 MG tablet TAKE 1 TABLET BY MOUTH DAILY. NEEDS APPOINTMENT FOR ADDITIONAL REFILLS (Patient taking differently: Patient taking 3 times a week.) 30 tablet 11   • melatonin 5 MG tablet tablet Take 5 mg by mouth Every Night.     • metoprolol tartrate (LOPRESSOR) 50 MG tablet Take 1 tablet by mouth 2 (Two) Times a Day. 180 tablet 3   • Misc Natural Products (OSTEO BI-FLEX ADV DOUBLE ST PO) Take 1 tablet by mouth 2 (Two) Times a Day.     • nitroglycerin (NITROSTAT) 0.4 MG SL tablet Place 0.4 mg under the tongue Every 5 (Five) Minutes As Needed.     • Nutritional Supplements (ENSURE HIGH PROTEIN PO) Take 8 oz by mouth Every Morning.     • Omeprazole (EQ OMEPRAZOLE) 20 MG tablet delayed-release Take 20 mg by mouth Daily. 90 each 3   • potassium chloride (K-DUR,KLOR-CON) 20 MEQ CR tablet Take 20 mEq by mouth Daily.     • Psyllium (METAMUCIL) 30.9 % powder Take 1 dose by mouth Daily.     • telmisartan-hydrochlorothiazide (MICARDIS HCT) 80-25 MG per  tablet Take 1 tablet by mouth Daily. 90 tablet 3     No facility-administered medications prior to visit.        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 up to date  Eye Exam. up to date  No exam data present  Exercise: 4 times/week.  Current exercise activities include: walking    Health Risk Assessment:  The patient has completed a Health Risk Assessment. This has been reviewed with them and has been scanned into the patient's chart.    Current Medical Providers:  Patient Care Team:  Frank Torrez MD as PCP - General (Internal Medicine)  Gabriel Rust MD as PCP - Claims Attributed  Ifeanyi Conley MD as Consulting Physician (General Surgery)    The [facility] providers who are involved in the care of this patient are listed above. Additional providers and suppliers are listed below:  Dr Savage      Recent Hospitalizations:  No hospitalization(s) within the last year..    Recent Lab Results:  CMP:  Lab Results   Component Value Date    GLU 92 07/30/2019    BUN 21 (H) 07/30/2019    CREATININE 1.20 07/30/2019    EGFRIFNONA 59 (L) 07/30/2019    EGFRIFAFRI 71 07/30/2019    BCR 17.5 07/30/2019     07/30/2019    K 3.5 07/30/2019    CO2 31.0 (H) 07/30/2019    CALCIUM 9.3 07/30/2019    PROTENTOTREF 7.1 07/30/2019    ALBUMIN 4.00 07/30/2019    LABGLOBREF 3.1  07/30/2019    LABIL2 1.3 07/30/2019    BILITOT 0.5 07/30/2019    ALKPHOS 83 07/30/2019    AST 31 07/30/2019    ALT 30 07/30/2019       LIPID PANEL:  Lab Results   Component Value Date    CHLPL 169 07/30/2019    TRIG 306 (H) 07/30/2019    HDL 32 (L) 07/30/2019    VLDL 61.2 07/30/2019    LDL 76 07/30/2019       HbA1c:  Lab Results   Component Value Date    HGBA1C 6.30 (H) 07/30/2019       URINE MICROALBUMIN:  No results found for: MICROALBUR, POCMALB    PSA:  Lab Results   Component Value Date    PSA 5.030 (H) 03/08/2018       Age-appropriate Screening Schedule:  Refer to the list below for future screening recommendations based on patient's age, sex and/or medical conditions. Orders for these recommended tests are listed in the plan section. The patient has been provided with a written plan.    Health Maintenance   Topic Date Due   • TDAP/TD VACCINES (1 - Tdap) [birth year]06/1959   • ZOSTER VACCINE (2 of 3) 02/13/2018   • INFLUENZA VACCINE  08/01/2019   • PNEUMOCOCCAL VACCINES (65+ LOW/MEDIUM RISK) (2 of 2 - PPSV23) 09/25/2019   • LIPID PANEL  07/30/2020   • COLONOSCOPY  03/13/2028       Depression Screen:   PHQ-2/PHQ-9 Depression Screening 9/11/2019   Little interest or pleasure in doing things 0   Feeling down, depressed, or hopeless 1   Total Score 1         Functional and Cognitive Screening:  Functional & Cognitive Status 9/11/2019   Do you have difficulty preparing food and eating? No   Do you have difficulty bathing yourself, getting dressed or grooming yourself? No   Do you have difficulty using the toilet? No   Do you have difficulty moving around from place to place? No   Do you have trouble with steps or getting out of a bed or a chair? No   Current Diet Well Balanced Diet   Dental Exam Up to date   Eye Exam Up to date   Exercise (times per week) 1 times per week   Current Exercise Activities Include Walking   Do you need help using the phone?  No   Are you deaf or do you have serious difficulty hearing?  Yes   Do  "you need help with transportation? No   Do you need help shopping? -   Do you need help preparing meals?  No   Do you need help with housework?  No   Do you need help with laundry? No   Do you need help taking your medications? No   Do you need help managing money? Yes   Do you ever drive or ride in a car without wearing a seat belt? No   Have you felt unusual stress, anger or loneliness in the last month? -   Who do you live with? -   If you need help, do you have trouble finding someone available to you? -   Have you been bothered in the last four weeks by sexual problems? -   Do you have difficulty concentrating, remembering or making decisions? -       Does the patient have evidence of cognitive impairment? No    Advanced Care Planning:  Patient has an advance directive - a copy has been provided and is visible in patient header    Identification of Risk Factors:  Risk factors include: Cardiovascular risk  Fall Risk.    Review of Systems    Compared to one year ago, the patient feels his physical health is better.  Compared to one year ago, the patient feels his mental health is better.    Objective     Physical Exam    Vitals:    09/11/19 1346   BP: 95/48   BP Location: Left arm   Patient Position: Sitting   Cuff Size: Large Adult   Pulse: 55   Resp: 16   Temp: 98.1 °F (36.7 °C)   TempSrc: Oral   SpO2: 97%   Weight: 103 kg (228 lb)   Height: 167.6 cm (66\")       Body mass index is 36.8 kg/m².  Discussed the patient's BMI with him. The BMI is above average; BMI management plan is completed.    Assessment/Plan   Patient Self-Management and Personalized Health Advice  The patient has been provided with information about: diet, exercise, weight management, prevention of cardiac or vascular disease and fall prevention and preventive services including:   · Annual Wellness Visit (AWV).    Visit Diagnoses:    ICD-10-CM ICD-9-CM   1. Routine general medical examination at a health care facility Z00.00 V70.0   2. " Permanent atrial fibrillation (CMS/HCC) I48.2 427.31   3. Coronary artery disease involving coronary bypass graft of native heart without angina pectoris I25.810 414.05   4. Angina pectoris (CMS/HCC) I20.9 413.9   5. Exudative age-related macular degeneration of right eye with active choroidal neovascularization (CMS/HCC) H35.3211 362.52     362.16   6. Morbidly obese (CMS/HCC) E66.01 278.01   7. Nocturnal hypoxia G47.34 327.24   8. Type 2 diabetes mellitus with other circulatory complication, without long-term current use of insulin (CMS/HCC) E11.59 250.70       Orders Placed This Encounter   Procedures   • Vitamin B12   • Lipid Panel   • Comprehensive Metabolic Panel   • TSH   • T4, Free   • Hemoglobin A1c   • Overnight Sleep Oximetry Study     Standing Status:   Future     Standing Expiration Date:   9/10/2020     Order Specific Question:   On room air?     Answer:   Yes   • CBC & Differential     Order Specific Question:   Manual Differential     Answer:   No       Outpatient Encounter Medications as of 9/11/2019   Medication Sig Dispense Refill   • atorvastatin (LIPITOR) 40 MG tablet Take 1 tablet by mouth Daily. 90 tablet 3   • B-12, Methylcobalamin, 1000 MCG sublingual tablet Place 1 tablet under the tongue Daily. 90 tablet 3   • cholecalciferol (VITAMIN D3) 1000 units tablet Take 1,000 Units by mouth Daily.     • Coenzyme Q10 100 MG tablet Take 200 mg by mouth Daily.     • ELIQUIS 5 MG tablet tablet Take 1 tablet by mouth 2 (Two) Times a Day.  1   • fenofibrate (TRICOR) 145 MG tablet Take 145 mg by mouth Daily.     • furosemide (LASIX) 20 MG tablet TAKE 1 TABLET BY MOUTH DAILY. NEEDS APPOINTMENT FOR ADDITIONAL REFILLS (Patient taking differently: Patient taking 3 times a week.) 30 tablet 11   • melatonin 5 MG tablet tablet Take 5 mg by mouth Every Night.     • metoprolol tartrate (LOPRESSOR) 50 MG tablet Take 1 tablet by mouth 2 (Two) Times a Day. 180 tablet 3   • Misc Natural Products (OSTEO BI-FLEX ADV  DOUBLE ST PO) Take 1 tablet by mouth 2 (Two) Times a Day.     • nitroglycerin (NITROSTAT) 0.4 MG SL tablet Place 0.4 mg under the tongue Every 5 (Five) Minutes As Needed.     • Nutritional Supplements (ENSURE HIGH PROTEIN PO) Take 8 oz by mouth Every Morning.     • Omeprazole (EQ OMEPRAZOLE) 20 MG tablet delayed-release Take 20 mg by mouth Daily. 90 each 3   • potassium chloride (K-DUR,KLOR-CON) 20 MEQ CR tablet Take 20 mEq by mouth Daily.     • Psyllium (METAMUCIL) 30.9 % powder Take 1 dose by mouth Daily.     • telmisartan-hydrochlorothiazide (MICARDIS HCT) 80-25 MG per tablet Take 1 tablet by mouth Daily. 90 tablet 3     No facility-administered encounter medications on file as of 9/11/2019.        Reviewed use of high risk medication in the elderly: yes  Reviewed for potential of harmful drug interactions in the elderly: yes    Follow Up:  No Follow-up on file.     An After Visit Summary and PPPS with all of these plans were given to the patient.        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 Will try to get over night o2 sat study and start o2 if qualifies.       Gabriel was seen today for medicare wellness-subsequent.    Diagnoses and all orders for this visit:    Routine general medical examination at a health care facility  -     Vitamin B12  -     CBC & Differential  -     Lipid Panel  -     Comprehensive Metabolic Panel  -     TSH  -     T4, Free  -     Hemoglobin A1c    Permanent atrial fibrillation (CMS/HCC)  -     Overnight Sleep Oximetry Study; Future    Coronary artery disease involving coronary bypass graft of native heart without angina pectoris  -     Overnight Sleep Oximetry Study; Future    Angina pectoris (CMS/HCC)  -     Vitamin B12  -     CBC & Differential  -     Lipid Panel  -     Comprehensive Metabolic Panel  -     TSH  -     T4, Free  -     Hemoglobin A1c    Exudative age-related macular degeneration of right eye with active choroidal neovascularization (CMS/HCC)  -      Overnight Sleep Oximetry Study; Future  -     Vitamin B12  -     CBC & Differential  -     Lipid Panel  -     Comprehensive Metabolic Panel  -     TSH  -     T4, Free  -     Hemoglobin A1c    Morbidly obese (CMS/HCC)  -     Overnight Sleep Oximetry Study; Future  -     Vitamin B12  -     CBC & Differential  -     Lipid Panel  -     Comprehensive Metabolic Panel  -     TSH  -     T4, Free  -     Hemoglobin A1c    Nocturnal hypoxia  -     Overnight Sleep Oximetry Study; Future  -     Vitamin B12  -     CBC & Differential  -     Lipid Panel  -     Comprehensive Metabolic Panel  -     TSH  -     T4, Free  -     Hemoglobin A1c    Type 2 diabetes mellitus with other circulatory complication, without long-term current use of insulin (CMS/[facility])  -     Vitamin B12  -     CBC & Differential  -     Lipid Panel  -     Comprehensive Metabolic Panel  -     TSH  -     T4, Free  -     Hemoglobin A1c

## 2019-11-29 DIAGNOSIS — K44.9 HERNIA, HIATAL: ICD-10-CM

## 2019-11-29 DIAGNOSIS — K21.9 GASTROESOPHAGEAL REFLUX DISEASE WITHOUT ESOPHAGITIS: ICD-10-CM

## 2019-11-29 DIAGNOSIS — K27.9 PUD (PEPTIC ULCER DISEASE): ICD-10-CM

## 2019-12-02 RX ORDER — OMEPRAZOLE 20 MG/1
CAPSULE, DELAYED RELEASE ORAL
Qty: 90 CAPSULE | Refills: 3 | Status: SHIPPED | OUTPATIENT
Start: 2019-12-02 | End: 2020-12-07 | Stop reason: SDUPTHER

## 2019-12-11 ENCOUNTER — OFFICE VISIT (OUTPATIENT)
Dept: INTERNAL MEDICINE | Facility: CLINIC | Age: 79
End: 2019-12-11

## 2019-12-11 VITALS
HEIGHT: 66 IN | HEART RATE: 57 BPM | TEMPERATURE: 97.7 F | BODY MASS INDEX: 37.61 KG/M2 | SYSTOLIC BLOOD PRESSURE: 136 MMHG | OXYGEN SATURATION: 97 % | DIASTOLIC BLOOD PRESSURE: 66 MMHG | WEIGHT: 234 LBS | RESPIRATION RATE: 16 BRPM

## 2019-12-11 DIAGNOSIS — M19.90 ARTHRITIS: Primary | ICD-10-CM

## 2019-12-11 DIAGNOSIS — E11.59 TYPE 2 DIABETES MELLITUS WITH OTHER CIRCULATORY COMPLICATION, WITHOUT LONG-TERM CURRENT USE OF INSULIN (HCC): ICD-10-CM

## 2019-12-11 DIAGNOSIS — I10 HYPERTENSION, UNSPECIFIED TYPE: ICD-10-CM

## 2019-12-11 DIAGNOSIS — Z12.5 ENCOUNTER FOR SCREENING FOR MALIGNANT NEOPLASM OF PROSTATE: ICD-10-CM

## 2019-12-11 PROCEDURE — 99214 OFFICE O/P EST MOD 30 MIN: CPT | Performed by: INTERNAL MEDICINE

## 2019-12-11 RX ORDER — FUROSEMIDE 20 MG/1
20 TABLET ORAL 3 TIMES WEEKLY
Qty: 30 TABLET | Refills: 11 | Status: SHIPPED | OUTPATIENT
Start: 2019-12-11 | End: 2020-10-07 | Stop reason: SDUPTHER

## 2019-12-11 NOTE — PROGRESS NOTES
Subjective     Patient ID: Gabriel Conley Jr. is a 79 y.o. male. Patient is here for management of multiple medical problems.     Chief Complaint   Patient presents with   • Coronary Artery Disease     follow-up     History of Present Illness         Htn, bp elevated has to under go root canal. Has ongoing tooth pain.    Increase arthitis in hands.  Chronic. Never worked up and now worse over the past few weeks.    On recently increase dose by Dr Brooks recently. Never seen him in the past year. Pt well established with Dr Savage and likes his care. Now dizzy on increase lasix dose.          The following portions of the patient's history were reviewed and updated as appropriate: allergies, current medications, past family history, past medical history, past social history, past surgical history and problem list.    Review of Systems   Constitutional: Positive for fatigue.   Musculoskeletal: Positive for arthralgias, back pain, gait problem and joint swelling.   Psychiatric/Behavioral: Negative for self-injury and sleep disturbance. The patient is not nervous/anxious.    All other systems reviewed and are negative.      Current Outpatient Medications:   •  atorvastatin (LIPITOR) 40 MG tablet, Take 1 tablet by mouth Daily., Disp: 90 tablet, Rfl: 3  •  B-12, Methylcobalamin, 1000 MCG sublingual tablet, Place 1 tablet under the tongue Daily., Disp: 90 tablet, Rfl: 3  •  cholecalciferol (VITAMIN D3) 1000 units tablet, Take 1,000 Units by mouth Daily., Disp: , Rfl:   •  Coenzyme Q10 100 MG tablet, Take 200 mg by mouth Daily., Disp: , Rfl:   •  ELIQUIS 5 MG tablet tablet, Take 1 tablet by mouth 2 (Two) Times a Day., Disp: , Rfl: 1  •  fenofibrate (TRICOR) 145 MG tablet, Take 145 mg by mouth Daily., Disp: , Rfl:   •  furosemide (LASIX) 20 MG tablet, Take 1 tablet by mouth 3 (Three) Times a Week., Disp: 30 tablet, Rfl: 11  •  melatonin 5 MG tablet tablet, Take 5 mg by mouth Every Night., Disp: , Rfl:   •  metoprolol tartrate  "(LOPRESSOR) 50 MG tablet, Take 1 tablet by mouth 2 (Two) Times a Day., Disp: 180 tablet, Rfl: 3  •  Misc Natural Products (OSTEO BI-FLEX ADV DOUBLE ST PO), Take 1 tablet by mouth 2 (Two) Times a Day., Disp: , Rfl:   •  nitroglycerin (NITROSTAT) 0.4 MG SL tablet, Place 0.4 mg under the tongue Every 5 (Five) Minutes As Needed., Disp: , Rfl:   •  Nutritional Supplements (ENSURE HIGH PROTEIN PO), Take 8 oz by mouth Every Morning., Disp: , Rfl:   •  omeprazole (priLOSEC) 20 MG capsule, TAKE 1 CAPSULE BY MOUTH EVERY DAY, Disp: 90 capsule, Rfl: 3  •  potassium chloride (K-DUR,KLOR-CON) 20 MEQ CR tablet, Take 20 mEq by mouth Daily., Disp: , Rfl:   •  Psyllium (METAMUCIL) 30.9 % powder, Take 1 dose by mouth Daily., Disp: , Rfl:   •  telmisartan-hydrochlorothiazide (MICARDIS HCT) 80-25 MG per tablet, Take 1 tablet by mouth Daily., Disp: 90 tablet, Rfl: 3    Objective      Blood pressure 136/66, pulse 57, temperature 97.7 °F (36.5 °C), temperature source Oral, resp. rate 16, height 167.6 cm (66\"), weight 106 kg (234 lb), SpO2 97 %.    Physical Exam     General Appearance:    Alert, cooperative, no distress, appears stated age   Head:    Normocephalic, without obvious abnormality, atraumatic   Eyes:    PERRL, conjunctiva/corneas clear, EOM's intact   Ears:    Normal TM's and external ear canals, both ears   Nose:   Nares normal, septum midline, mucosa normal, no drainage   or sinus tenderness   Throat:   Lips, mucosa, and tongue normal; teeth and gums normal   Neck:   Supple, symmetrical, trachea midline, no adenopathy;        thyroid:  No enlargement/tenderness/nodules; no carotid    bruit or JVD   Back:     Symmetric, no curvature, ROM normal, no CVA tenderness   Lungs:     Clear to auscultation bilaterally, respirations unlabored   Chest wall:    No tenderness or deformity   Heart:    Regular rate and rhythm, S1 and S2 normal, no murmur,        rub or gallop   Abdomen:     Soft, non-tender, bowel sounds active all four " quadrants,     no masses, no organomegaly   Extremities:   Synovitis in hand joints multiple.  Extremities normal, atraumatic, no cyanosis or edema   Pulses:   2+ and symmetric all extremities   Skin:   Skin color, texture, turgor normal, no rashes or lesions   Lymph nodes:   Cervical, supraclavicular, and axillary nodes normal   Neurologic:   CNII-XII intact. Normal strength, sensation and reflexes       throughout      Results for orders placed or performed during the hospital encounter of 08/21/19   Adult Transthoracic Echo Complete W/ Cont if Necessary Per Protocol   Result Value Ref Range    IVSd 1.0 cm    IVSs 1.8 cm    LVIDd 5.1 cm    LVIDs 3.8 cm    LVPWd 1.7 cm    BH CV ECHO DEIDRA - LVPWS 1.9 cm    IVS/LVPW 0.62     FS 26.0 %    EDV(Teich) 121.9 ml    ESV(Teich) 60.0 ml    EF(Teich) 50.8 %    EDV(cubed) 130.1 ml    ESV(cubed) 52.7 ml    EF(cubed) 59.5 %    % IVS thick 69.4 %    % LVPW thick 14.0 %    LV mass(C)d 282.2 grams    LV mass(C)s 293.9 grams    SV(Teich) 61.9 ml    SV(cubed) 77.3 ml    Ao root diam 2.2 cm    Ao root area 3.8 cm^2    LA dimension 4.6 cm    LA/Ao 2.1     LVOT diam 2.1 cm    LVOT area 3.5 cm^2    LVOT area(traced) 3.5 cm^2    LAd major 5.3 cm    LVLd ap4 8.1 cm    EDV(MOD-sp4) 125.0 ml    LVLs ap4 6.6 cm    ESV(MOD-sp4) 43.0 ml    EF(MOD-sp4) 65.6 %    LA volume 64.0 ml    SV(MOD-sp4) 82.0 ml    MV E max josue 59.1 cm/sec    MV A max josue 72.2 cm/sec    MV E/A 0.82     MV V2 max 77.2 cm/sec    MV max PG 2.4 mmHg    MV V2 mean 48.6 cm/sec    MV mean PG 1.0 mmHg    MV V2 VTI 22.6 cm    MVA(VTI) 3.4 cm^2    MV P1/2t max joseu 63.3 cm/sec    MV P1/2t 81.3 msec    MVA(P1/2t) 2.7 cm^2    MV dec slope 228.0 cm/sec^2    MV dec time 0.29 sec    Ao pk josue 169.0 cm/sec    Ao max PG 11.0 mmHg    Ao max PG (full) 6.8 mmHg    Ao V2 mean 125.0 cm/sec    Ao mean PG 7.0 mmHg    Ao mean PG (full) 5.0 mmHg    Ao V2 VTI 31.9 cm    CLARENCE(I,A) 2.4 cm^2    CLARENCE(I,D) 2.4 cm^2    CLARENCE(V,A) 2.1 cm^2    CLARENCE(V,D) 2.1 cm^2     LV V1 max PG 4.2 mmHg    LV V1 mean PG 2.0 mmHg    LV V1 max 102.7 cm/sec    LV V1 mean 53.2 cm/sec    LV V1 VTI 22.1 cm    SV(Ao) 121.3 ml    SV(LVOT) 76.5 ml    TV V2 max 63.9 cm/sec    TV max PG 1.6 mmHg    PA V2 max 87.3 cm/sec    PA max PG 3.0 mmHg    TR max kristian 197.5 cm/sec    BH CV ECHO DEIDRA - TR MAX PG 16.0 mmHg    RVSP(TR) 26.0 mmHg    RAP systole 10.0 mmHg    MVA P1/2T LCG 3.5 cm^2    Lat E/e'  4.2     Med E/e' 7.1     Lat Peak E' Kristian 14.1 cm/sec    Med Peak E' Kristian 8.3 cm/sec    Avg E/e' ratio 5.28          Assessment/Plan       Gabriel was seen today for coronary artery disease.    Diagnoses and all orders for this visit:    Arthritis  -     TSH  -     T4, Free  -     Comprehensive Metabolic Panel  -     Vitamin B12  -     CBC & Differential  -     Lipid Panel  -     PSA Screen  -     [place] Spotted Fever, IgM  -     [place] Spotted Fever, IgG  -     Lyme Disease, PCR  -     Ehrlichia Antibody Panel  -     Cyclic Citrul Peptide Antibody, IgG / IgA  -     Rheumatoid Factor  -     Sedimentation Rate  -     Uric Acid  -     C-reactive Protein  -     Antistreptolysin O Titer  -     Hemoglobin A1c  -     MicroAlbumin, Urine, Random - Urine, Clean Catch    Type 2 diabetes mellitus with other circulatory complication, without long-term current use of insulin (CMS/[facility])  -     TSH  -     T4, Free  -     Comprehensive Metabolic Panel  -     Vitamin B12  -     CBC & Differential  -     Lipid Panel  -     PSA Screen  -     [place] Spotted Fever, IgM  -     [place] Spotted Fever, IgG  -     Lyme Disease, PCR  -     Ehrlichia Antibody Panel  -     Cyclic Citrul Peptide Antibody, IgG / IgA  -     Rheumatoid Factor  -     Sedimentation Rate  -     Uric Acid  -     C-reactive Protein  -     Antistreptolysin O Titer  -     Hemoglobin A1c  -     MicroAlbumin, Urine, Random - Urine, Clean Catch    Hypertension, unspecified type  -     TSH  -     T4, Free  -     Comprehensive Metabolic Panel  -     Vitamin  B12  -     CBC & Differential  -     Lipid Panel  -     PSA Screen  -     [place] Spotted Fever, IgM  -     [place] Spotted Fever, IgG  -     Lyme Disease, PCR  -     Ehrlichia Antibody Panel  -     Cyclic Citrul Peptide Antibody, IgG / IgA  -     Rheumatoid Factor  -     Sedimentation Rate  -     Uric Acid  -     C-reactive Protein  -     Antistreptolysin O Titer  -     Hemoglobin A1c  -     MicroAlbumin, Urine, Random - Urine, Clean Catch    Encounter for screening for malignant neoplasm of prostate   -     PSA Screen    Other orders  -     furosemide (LASIX) 20 MG tablet; Take 1 tablet by mouth 3 (Three) Times a Week.      Return in about 6 weeks (around 1/22/2020).          There are no Patient Instructions on file for this visit.     Frank Torrez MD    Assessment/Plan

## 2019-12-12 NOTE — PROGRESS NOTES
Please let them know the *psa is increaseing. Other labs need adressed. Move rtc apt to  Next week.

## 2019-12-13 LAB
A PHAGOCYTOPH IGG TITR SER IF: NEGATIVE {TITER}
A PHAGOCYTOPH IGM TITR SER IF: NEGATIVE {TITER}
ALBUMIN SERPL-MCNC: 4.3 G/DL (ref 3.5–5.2)
ALBUMIN/GLOB SERPL: 1.6 G/DL
ALP SERPL-CCNC: 69 U/L (ref 39–117)
ALT SERPL-CCNC: 21 U/L (ref 1–41)
ASO AB SERPL-ACNC: <20 IU/ML (ref 0–200)
AST SERPL-CCNC: 22 U/L (ref 1–40)
B BURGDOR DNA SPEC QL NAA+PROBE: NEGATIVE
BASOPHILS # BLD AUTO: 0.04 10*3/MM3 (ref 0–0.2)
BASOPHILS NFR BLD AUTO: 0.5 % (ref 0–1.5)
BILIRUB SERPL-MCNC: 0.4 MG/DL (ref 0.2–1.2)
BUN SERPL-MCNC: 17 MG/DL (ref 8–23)
BUN/CREAT SERPL: 14.4 (ref 7–25)
CALCIUM SERPL-MCNC: 9.6 MG/DL (ref 8.6–10.5)
CCP IGA+IGG SERPL IA-ACNC: 48 UNITS (ref 0–19)
CHLORIDE SERPL-SCNC: 100 MMOL/L (ref 98–107)
CHOLEST SERPL-MCNC: 175 MG/DL (ref 0–200)
CO2 SERPL-SCNC: 30.5 MMOL/L (ref 22–29)
CREAT SERPL-MCNC: 1.18 MG/DL (ref 0.76–1.27)
CRP SERPL-MCNC: 0.31 MG/DL (ref 0–0.5)
E CHAFFEENSIS IGG TITR SER IF: NEGATIVE {TITER}
E CHAFFEENSIS IGM TITR SER IF: NEGATIVE {TITER}
EOSINOPHIL # BLD AUTO: 0.23 10*3/MM3 (ref 0–0.4)
EOSINOPHIL NFR BLD AUTO: 2.7 % (ref 0.3–6.2)
ERYTHROCYTE [DISTWIDTH] IN BLOOD BY AUTOMATED COUNT: 13.5 % (ref 12.3–15.4)
ERYTHROCYTE [SEDIMENTATION RATE] IN BLOOD BY WESTERGREN METHOD: 14 MM/HR (ref 0–20)
GLOBULIN SER CALC-MCNC: 2.7 GM/DL
GLUCOSE SERPL-MCNC: 89 MG/DL (ref 65–99)
HBA1C MFR BLD: 6.1 % (ref 4.8–5.6)
HCT VFR BLD AUTO: 43.3 % (ref 37.5–51)
HDLC SERPL-MCNC: 34 MG/DL (ref 40–60)
HGB BLD-MCNC: 15.3 G/DL (ref 13–17.7)
IMM GRANULOCYTES # BLD AUTO: 0.03 10*3/MM3 (ref 0–0.05)
IMM GRANULOCYTES NFR BLD AUTO: 0.4 % (ref 0–0.5)
LDLC SERPL CALC-MCNC: 69 MG/DL (ref 0–100)
LYMPHOCYTES # BLD AUTO: 2.72 10*3/MM3 (ref 0.7–3.1)
LYMPHOCYTES NFR BLD AUTO: 32.3 % (ref 19.6–45.3)
MCH RBC QN AUTO: 31.7 PG (ref 26.6–33)
MCHC RBC AUTO-ENTMCNC: 35.3 G/DL (ref 31.5–35.7)
MCV RBC AUTO: 89.6 FL (ref 79–97)
MICROALBUMIN UR-MCNC: 7.2 UG/ML
MONOCYTES # BLD AUTO: 0.69 10*3/MM3 (ref 0.1–0.9)
MONOCYTES NFR BLD AUTO: 8.2 % (ref 5–12)
NEUTROPHILS # BLD AUTO: 4.71 10*3/MM3 (ref 1.7–7)
NEUTROPHILS NFR BLD AUTO: 55.9 % (ref 42.7–76)
NRBC BLD AUTO-RTO: 0 /100 WBC (ref 0–0.2)
PLATELET # BLD AUTO: 150 10*3/MM3 (ref 140–450)
POTASSIUM SERPL-SCNC: 4 MMOL/L (ref 3.5–5.2)
PROT SERPL-MCNC: 7 G/DL (ref 6–8.5)
PSA SERPL-MCNC: 6.55 NG/ML (ref 0–4)
R RICKETTSI IGG SER QL IA: NEGATIVE
R RICKETTSI IGM SER-ACNC: 2.24 INDEX (ref 0–0.89)
RBC # BLD AUTO: 4.83 10*6/MM3 (ref 4.14–5.8)
RHEUMATOID FACT SERPL-ACNC: <10 IU/ML (ref 0–13.9)
SODIUM SERPL-SCNC: 141 MMOL/L (ref 136–145)
T4 FREE SERPL-MCNC: 1.26 NG/DL (ref 0.93–1.7)
TRIGL SERPL-MCNC: 358 MG/DL (ref 0–150)
TSH SERPL DL<=0.005 MIU/L-ACNC: 3.12 UIU/ML (ref 0.27–4.2)
URATE SERPL-MCNC: 7.6 MG/DL (ref 3.4–7)
VIT B12 SERPL-MCNC: 1193 PG/ML (ref 211–946)
VLDLC SERPL CALC-MCNC: 71.6 MG/DL
WBC # BLD AUTO: 8.42 10*3/MM3 (ref 3.4–10.8)

## 2019-12-17 ENCOUNTER — OFFICE VISIT (OUTPATIENT)
Dept: INTERNAL MEDICINE | Facility: CLINIC | Age: 79
End: 2019-12-17

## 2019-12-17 VITALS
HEIGHT: 66 IN | WEIGHT: 229.12 LBS | SYSTOLIC BLOOD PRESSURE: 121 MMHG | DIASTOLIC BLOOD PRESSURE: 47 MMHG | HEART RATE: 54 BPM | RESPIRATION RATE: 16 BRPM | OXYGEN SATURATION: 97 % | BODY MASS INDEX: 36.82 KG/M2 | TEMPERATURE: 98.2 F

## 2019-12-17 DIAGNOSIS — M19.90 ARTHRITIS: Primary | ICD-10-CM

## 2019-12-17 PROCEDURE — 99214 OFFICE O/P EST MOD 30 MIN: CPT | Performed by: INTERNAL MEDICINE

## 2019-12-17 RX ORDER — ALLOPURINOL 100 MG/1
100 TABLET ORAL DAILY
Qty: 30 TABLET | Refills: 11 | Status: SHIPPED | OUTPATIENT
Start: 2019-12-17 | End: 2020-02-14 | Stop reason: ALTCHOICE

## 2019-12-17 RX ORDER — MINOCYCLINE HYDROCHLORIDE 100 MG/1
100 CAPSULE ORAL 2 TIMES DAILY
Qty: 60 CAPSULE | Refills: 0 | Status: SHIPPED | OUTPATIENT
Start: 2019-12-17 | End: 2020-02-14

## 2019-12-17 NOTE — PROGRESS NOTES
Subjective     Patient ID: Gabriel Conley Jr. is a 79 y.o. male. Patient is here for management of multiple medical problems.     Chief Complaint   Patient presents with   • Arthritis     follwo-up on lab results     Arthritis   Presents for follow-up visit. He complains of pain, joint swelling and joint warmth. The symptoms have been worsening. His pain is at a severity of 6/10. Pertinent negatives include no diarrhea, dry eyes, dry mouth or dysuria. Compliance with medications is %.              The following portions of the patient's history were reviewed and updated as appropriate: allergies, current medications, past family history, past medical history, past social history, past surgical history and problem list.    Review of Systems   Gastrointestinal: Negative for diarrhea.   Genitourinary: Negative for dysuria.   Musculoskeletal: Positive for arthritis and joint swelling.       Current Outpatient Medications:   •  atorvastatin (LIPITOR) 40 MG tablet, Take 1 tablet by mouth Daily., Disp: 90 tablet, Rfl: 3  •  B-12, Methylcobalamin, 1000 MCG sublingual tablet, Place 1 tablet under the tongue Daily., Disp: 90 tablet, Rfl: 3  •  cholecalciferol (VITAMIN D3) 1000 units tablet, Take 1,000 Units by mouth Daily., Disp: , Rfl:   •  Coenzyme Q10 100 MG tablet, Take 200 mg by mouth Daily., Disp: , Rfl:   •  ELIQUIS 5 MG tablet tablet, Take 1 tablet by mouth 2 (Two) Times a Day., Disp: , Rfl: 1  •  fenofibrate (TRICOR) 145 MG tablet, Take 145 mg by mouth Daily., Disp: , Rfl:   •  furosemide (LASIX) 20 MG tablet, Take 1 tablet by mouth 3 (Three) Times a Week., Disp: 30 tablet, Rfl: 11  •  melatonin 5 MG tablet tablet, Take 5 mg by mouth Every Night., Disp: , Rfl:   •  Misc Natural Products (OSTEO BI-FLEX ADV DOUBLE ST PO), Take 1 tablet by mouth 2 (Two) Times a Day., Disp: , Rfl:   •  nitroglycerin (NITROSTAT) 0.4 MG SL tablet, Place 0.4 mg under the tongue Every 5 (Five) Minutes As Needed., Disp: , Rfl:   •   "Nutritional Supplements (ENSURE HIGH PROTEIN PO), Take 8 oz by mouth Every Morning., Disp: , Rfl:   •  omeprazole (priLOSEC) 20 MG capsule, TAKE 1 CAPSULE BY MOUTH EVERY DAY, Disp: 90 capsule, Rfl: 3  •  potassium chloride (K-DUR,KLOR-CON) 20 MEQ CR tablet, Take 20 mEq by mouth Daily., Disp: , Rfl:   •  Psyllium (METAMUCIL) 30.9 % powder, Take 1 dose by mouth Daily., Disp: , Rfl:   •  telmisartan-hydrochlorothiazide (MICARDIS HCT) 80-25 MG per tablet, Take 1 tablet by mouth Daily., Disp: 90 tablet, Rfl: 3  •  allopurinol (ZYLOPRIM) 100 MG tablet, Take 1 tablet by mouth Daily., Disp: 30 tablet, Rfl: 11  •  metoprolol tartrate (LOPRESSOR) 50 MG tablet, Take 1 tablet by mouth 2 (Two) Times a Day., Disp: 180 tablet, Rfl: 3  •  minocycline (MINOCIN) 100 MG capsule, Take 1 capsule by mouth 2 (Two) Times a Day., Disp: 60 capsule, Rfl: 0    Objective      Blood pressure 121/47, pulse 54, temperature 98.2 °F (36.8 °C), temperature source Oral, resp. rate 16, height 167.6 cm (66\"), weight 104 kg (229 lb 1.9 oz), SpO2 97 %.    Physical Exam     General Appearance:    Alert, cooperative, no distress, appears stated age   Head:    Normocephalic, without obvious abnormality, atraumatic   Eyes:    PERRL, conjunctiva/corneas clear, EOM's intact   Ears:    Normal TM's and external ear canals, both ears   Nose:   Nares normal, septum midline, mucosa normal, no drainage   or sinus tenderness   Throat:   Lips, mucosa, and tongue normal; teeth and gums normal   Neck:   Supple, symmetrical, trachea midline, no adenopathy;        thyroid:  No enlargement/tenderness/nodules; no carotid    bruit or JVD   Back:     Symmetric, no curvature, ROM normal, no CVA tenderness   Lungs:     Clear to auscultation bilaterally, respirations unlabored   Chest wall:    No tenderness or deformity   Heart:    Regular rate and rhythm, S1 and S2 normal, no murmur,        rub or gallop   Abdomen:     Soft, non-tender, bowel sounds active all four quadrants,    "  no masses, no organomegaly   Extremities:   Extremities normal, atraumatic, no cyanosis or edema   Pulses:   2+ and symmetric all extremities   Skin:   Skin color, texture, turgor normal, no rashes or lesions   Lymph nodes:   Cervical, supraclavicular, and axillary nodes normal   Neurologic:   CNII-XII intact. Normal strength, sensation and reflexes       throughout      Results for orders placed or performed in visit on 12/11/19   TSH   Result Value Ref Range    TSH 3.120 0.270 - 4.200 uIU/mL   T4, Free   Result Value Ref Range    Free T4 1.26 0.93 - 1.70 ng/dL   Comprehensive Metabolic Panel   Result Value Ref Range    Glucose 89 65 - 99 mg/dL    BUN 17 8 - 23 mg/dL    Creatinine 1.18 0.76 - 1.27 mg/dL    eGFR Non African Am 60 (L) >60 mL/min/1.73    eGFR African Am 72 >60 mL/min/1.73    BUN/Creatinine Ratio 14.4 7.0 - 25.0    Sodium 141 136 - 145 mmol/L    Potassium 4.0 3.5 - 5.2 mmol/L    Chloride 100 98 - 107 mmol/L    Total CO2 30.5 (H) 22.0 - 29.0 mmol/L    Calcium 9.6 8.6 - 10.5 mg/dL    Total Protein 7.0 6.0 - 8.5 g/dL    Albumin 4.30 3.50 - 5.20 g/dL    Globulin 2.7 gm/dL    A/G Ratio 1.6 g/dL    Total Bilirubin 0.4 0.2 - 1.2 mg/dL    Alkaline Phosphatase 69 39 - 117 U/L    AST (SGOT) 22 1 - 40 U/L    ALT (SGPT) 21 1 - 41 U/L   Vitamin B12   Result Value Ref Range    Vitamin B-12 1,193 (H) 211 - 946 pg/mL   Lipid Panel   Result Value Ref Range    Total Cholesterol 175 0 - 200 mg/dL    Triglycerides 358 (H) 0 - 150 mg/dL    HDL Cholesterol 34 (L) 40 - 60 mg/dL    VLDL Cholesterol 71.6 mg/dL    LDL Cholesterol  69 0 - 100 mg/dL   PSA Screen   Result Value Ref Range    PSA 6.550 (H) 0.000 - 4.000 ng/mL   [place] Spotted Fever, IgM   Result Value Ref Range    RMSF IgM 2.24 (H) 0.00 - 0.89 index   [place] Spotted Fever, IgG   Result Value Ref Range    RMSF IgG Negative Negative   Lyme Disease, PCR   Result Value Ref Range    Lyme Disease(B.burgdorferi)PCR Negative Negative   Ehrlichia Antibody Panel    Result Value Ref Range    E. chaffeensis ([facility]) IgG Titer Negative Neg:<1:64    E. chaffeensis ([facility]) IgM Titer Negative Neg:<1:20    HGE IgG Titer Negative Neg:<1:64    HGE IgM Titer Negative Neg:<1:20   Cyclic Citrul Peptide Antibody, IgG / IgA   Result Value Ref Range    CCP Antibodies IgG/IgA 48 (H) 0 - 19 units   Rheumatoid Factor   Result Value Ref Range    RA Latex Turbid <10.0 0.0 - 13.9 IU/mL   Sedimentation Rate   Result Value Ref Range    Sed Rate 14 0 - 20 mm/hr   Uric Acid   Result Value Ref Range    Uric Acid 7.6 (H) 3.4 - 7.0 mg/dL   C-reactive Protein   Result Value Ref Range    C-Reactive Protein 0.31 0.00 - 0.50 mg/dL   Antistreptolysin O Titer   Result Value Ref Range    ASO <20.0 0.0 - 200.0 IU/mL   Hemoglobin A1c   Result Value Ref Range    Hemoglobin A1C 6.10 (H) 4.80 - 5.60 %   MicroAlbumin, Urine, Random - Urine, Clean Catch   Result Value Ref Range    Microalbumin, Urine 7.2 Not Estab. ug/mL   CBC & Differential   Result Value Ref Range    WBC 8.42 3.40 - 10.80 10*3/mm3    RBC 4.83 4.14 - 5.80 10*6/mm3    Hemoglobin 15.3 13.0 - 17.7 g/dL    Hematocrit 43.3 37.5 - 51.0 %    MCV 89.6 79.0 - 97.0 fL    MCH 31.7 26.6 - 33.0 pg    MCHC 35.3 31.5 - 35.7 g/dL    RDW 13.5 12.3 - 15.4 %    Platelets 150 140 - 450 10*3/mm3    Neutrophil Rel % 55.9 42.7 - 76.0 %    Lymphocyte Rel % 32.3 19.6 - 45.3 %    Monocyte Rel % 8.2 5.0 - 12.0 %    Eosinophil Rel % 2.7 0.3 - 6.2 %    Basophil Rel % 0.5 0.0 - 1.5 %    Neutrophils Absolute 4.71 1.70 - 7.00 10*3/mm3    Lymphocytes Absolute 2.72 0.70 - 3.10 10*3/mm3    Monocytes Absolute 0.69 0.10 - 0.90 10*3/mm3    Eosinophils Absolute 0.23 0.00 - 0.40 10*3/mm3    Basophils Absolute 0.04 0.00 - 0.20 10*3/mm3    Immature Granulocyte Rel % 0.4 0.0 - 0.5 %    Immature Grans Absolute 0.03 0.00 - 0.05 10*3/mm3    nRBC 0.0 0.0 - 0.2 /100 WBC         Assessment/Plan   + RMSF ab found.  Elevated uric acid.  + anti ccp.           Gabriel was seen today for  arthritis.    Diagnoses and all orders for this visit:    Arthritis  -     XR Hand 2 View Bilateral  -     [place] Spotted Fever, IgM  -     Uric Acid    Other orders  -     allopurinol (ZYLOPRIM) 100 MG tablet; Take 1 tablet by mouth Daily.  -     minocycline (MINOCIN) 100 MG capsule; Take 1 capsule by mouth 2 (Two) Times a Day.      Return in about 6 weeks (around 1/28/2020).          There are no Patient Instructions on file for this visit.     Frank Torrez MD    Assessment/Plan

## 2020-01-10 RX ORDER — MINOCYCLINE HYDROCHLORIDE 100 MG/1
CAPSULE ORAL
Qty: 60 CAPSULE | Refills: 0 | OUTPATIENT
Start: 2020-01-10

## 2020-01-16 RX ORDER — METOPROLOL TARTRATE 50 MG/1
TABLET, FILM COATED ORAL
Qty: 180 TABLET | Refills: 3 | Status: SHIPPED | OUTPATIENT
Start: 2020-01-16 | End: 2021-02-01

## 2020-01-29 ENCOUNTER — TELEPHONE (OUTPATIENT)
Dept: CARDIOLOGY | Facility: CLINIC | Age: 80
End: 2020-01-29

## 2020-01-29 RX ORDER — APIXABAN 5 MG/1
5 TABLET, FILM COATED ORAL 2 TIMES DAILY
Qty: 180 TABLET | Refills: 1 | Status: SHIPPED | OUTPATIENT
Start: 2020-01-29 | End: 2020-07-15

## 2020-02-14 ENCOUNTER — OFFICE VISIT (OUTPATIENT)
Dept: CARDIOLOGY | Facility: CLINIC | Age: 80
End: 2020-02-14

## 2020-02-14 VITALS
SYSTOLIC BLOOD PRESSURE: 114 MMHG | HEIGHT: 66 IN | HEART RATE: 57 BPM | BODY MASS INDEX: 36.32 KG/M2 | DIASTOLIC BLOOD PRESSURE: 78 MMHG | OXYGEN SATURATION: 95 % | WEIGHT: 226 LBS

## 2020-02-14 DIAGNOSIS — E08.00 DIABETES MELLITUS DUE TO UNDERLYING CONDITION WITH HYPEROSMOLARITY WITHOUT COMA, WITHOUT LONG-TERM CURRENT USE OF INSULIN (HCC): ICD-10-CM

## 2020-02-14 DIAGNOSIS — I25.10 CORONARY ARTERY DISEASE INVOLVING NATIVE CORONARY ARTERY OF NATIVE HEART WITHOUT ANGINA PECTORIS: ICD-10-CM

## 2020-02-14 DIAGNOSIS — I10 ESSENTIAL HYPERTENSION: ICD-10-CM

## 2020-02-14 DIAGNOSIS — E11.59 TYPE 2 DIABETES MELLITUS WITH OTHER CIRCULATORY COMPLICATION, WITHOUT LONG-TERM CURRENT USE OF INSULIN (HCC): ICD-10-CM

## 2020-02-14 DIAGNOSIS — I20.9 ANGINA PECTORIS (HCC): Primary | ICD-10-CM

## 2020-02-14 DIAGNOSIS — H35.3211 EXUDATIVE AGE-RELATED MACULAR DEGENERATION OF RIGHT EYE WITH ACTIVE CHOROIDAL NEOVASCULARIZATION (HCC): ICD-10-CM

## 2020-02-14 DIAGNOSIS — E66.01 MORBIDLY OBESE (HCC): ICD-10-CM

## 2020-02-14 DIAGNOSIS — I48.21 PERMANENT ATRIAL FIBRILLATION (HCC): ICD-10-CM

## 2020-02-14 PROCEDURE — 99213 OFFICE O/P EST LOW 20 MIN: CPT | Performed by: NURSE PRACTITIONER

## 2020-02-14 RX ORDER — ATORVASTATIN CALCIUM 20 MG/1
40 TABLET, FILM COATED ORAL DAILY
COMMUNITY
Start: 2020-01-07 | End: 2020-08-13 | Stop reason: SDUPTHER

## 2020-02-14 NOTE — PROGRESS NOTES
"Gabriel Conley Jr. is a 79 y.o. male.  MRN #: [id number]    Referring Provider: Frank Torrez MD    Chief Complaint:   Chief Complaint   Patient presents with   • Follow-up   • Coronary Artery Disease   • Atrial Fibrillation        History of Present Illness:  Mr. Conley is a 79-year-old gentleman that presents for 6-month cardiac follow-up.  He has history of angina, CAD, hypertension, and permanent atrial fibrillation.  Patient has history of three-vessel CABG in 2002, and prior history of stent placement x2.  Patient reports that he is \"doing well\".  He denies any symptoms of chest pain, chest palpitations, chest pressure, unusual shortness of breath, weight gain or fluid retention, or diaphoresis.    The patient presents today with their self who contributes to the history of their care.     The following portions of the patient's history were reviewed and updated as appropriate: allergies, current medications, past family history, past medical history, past social history, past surgical history and problem list.     Review of Systems:     Review of Systems   Constitutional: Positive for fatigue. Negative for activity change, appetite change, diaphoresis, unexpected weight gain and unexpected weight loss.   HENT: Negative.    Eyes: Negative.  Negative for blurred vision, double vision, photophobia and visual disturbance.   Respiratory: Positive for shortness of breath. Negative for apnea, cough, chest tightness and wheezing.         Mild shortness of breath with exertion that he relates to aging.   Cardiovascular: Negative.  Negative for chest pain, palpitations and leg swelling.   Gastrointestinal: Negative.  Negative for abdominal distention, abdominal pain, blood in stool, nausea, vomiting, GERD and indigestion.   Endocrine: Negative.  Negative for cold intolerance, heat intolerance, polydipsia, polyphagia and polyuria.   Genitourinary: Negative.  Negative for decreased libido, frequency, genital sores, " hematuria and urgency.   Musculoskeletal: Negative.  Negative for back pain, joint swelling, myalgias, neck pain and neck stiffness.   Skin: Negative.  Negative for color change, pallor, rash and bruise.   Allergic/Immunologic: Negative.  Negative for environmental allergies, food allergies and immunocompromised state.   Neurological: Negative.  Negative for dizziness, tremors, seizures, syncope, facial asymmetry, speech difficulty, weakness, light-headedness and numbness.   Hematological: Negative.  Negative for adenopathy. Does not bruise/bleed easily.   Psychiatric/Behavioral: Negative.  Negative for agitation, decreased concentration, self-injury, sleep disturbance, suicidal ideas, negative for hyperactivity and stress. The patient is not nervous/anxious.    All other systems reviewed and are negative.         Current Outpatient Medications:   •  atorvastatin (LIPITOR) 20 MG tablet, Take 40 mg by mouth Daily., Disp: , Rfl:   •  B-12, Methylcobalamin, 1000 MCG sublingual tablet, Place 1 tablet under the tongue Daily., Disp: 90 tablet, Rfl: 3  •  cholecalciferol (VITAMIN D3) 1000 units tablet, Take 1,000 Units by mouth Daily., Disp: , Rfl:   •  Coenzyme Q10 100 MG tablet, Take 200 mg by mouth Daily., Disp: , Rfl:   •  ELIQUIS 5 MG tablet tablet, Take 1 tablet by mouth 2 (Two) Times a Day., Disp: 180 tablet, Rfl: 1  •  fenofibrate (TRICOR) 145 MG tablet, Take 145 mg by mouth Daily., Disp: , Rfl:   •  furosemide (LASIX) 20 MG tablet, Take 1 tablet by mouth 3 (Three) Times a Week., Disp: 30 tablet, Rfl: 11  •  melatonin 5 MG tablet tablet, Take 5 mg by mouth Every Night., Disp: , Rfl:   •  metoprolol tartrate (LOPRESSOR) 50 MG tablet, TAKE 1 TABLET BY MOUTH TWICE A DAY, Disp: 180 tablet, Rfl: 3  •  Misc Natural Products (OSTEO BI-FLEX ADV DOUBLE ST PO), Take 1 tablet by mouth 2 (Two) Times a Day., Disp: , Rfl:   •  nitroglycerin (NITROSTAT) 0.4 MG SL tablet, Place 0.4 mg under the tongue Every 5 (Five) Minutes As  "Needed., Disp: , Rfl:   •  Nutritional Supplements (ENSURE HIGH PROTEIN PO), Take 8 oz by mouth Every Morning., Disp: , Rfl:   •  omeprazole (priLOSEC) 20 MG capsule, TAKE 1 CAPSULE BY MOUTH EVERY DAY, Disp: 90 capsule, Rfl: 3  •  potassium chloride (K-DUR,KLOR-CON) 20 MEQ CR tablet, Take 20 mEq by mouth Daily., Disp: , Rfl:   •  telmisartan-hydrochlorothiazide (MICARDIS HCT) 80-25 MG per tablet, Take 1 tablet by mouth Daily., Disp: 90 tablet, Rfl: 3    Vitals:    02/14/20 1153   BP: 114/78   BP Location: Right arm   Patient Position: Sitting   Cuff Size: Adult   Pulse: 57   SpO2: 95%   Weight: 103 kg (226 lb)   Height: 167.6 cm (66\")       Physical Exam:     Physical Exam   Constitutional: He is oriented to person, place, and time. He appears well-developed and well-nourished. No distress.   HENT:   Head: Normocephalic and atraumatic.   Eyes: Pupils are equal, round, and reactive to light. Conjunctivae are normal. No scleral icterus.   Neck: Normal range of motion and full passive range of motion without pain. Neck supple. No JVD present. Carotid bruit is not present.   Cardiovascular: Normal rate, regular rhythm, S1 normal, S2 normal, normal heart sounds and intact distal pulses. PMI is not displaced. Exam reveals no gallop and no friction rub.   No murmur heard.  Pulses:       Carotid pulses are 1+ on the right side, and 1+ on the left side.  Pulmonary/Chest: Effort normal and breath sounds normal. No respiratory distress. He has no wheezes. He has no rales. He exhibits no tenderness.   Abdominal: Soft. Bowel sounds are normal. He exhibits no distension and no mass. There is no tenderness.   Musculoskeletal: Normal range of motion. He exhibits no edema.   Neurological: He is alert and oriented to person, place, and time. No sensory deficit.   Skin: Skin is warm and dry. Capillary refill takes less than 2 seconds. No rash noted. He is not diaphoretic.   Psychiatric: He has a normal mood and affect. His behavior is " normal. Judgment and thought content normal.   Nursing note and vitals reviewed.      Procedures    Results:   Reviewed patient's medication regimen and updated.  Viewed medical, surgical, social history and updated.    Assessment/Plan:   No changes made in medication regimen.  Follow-up 6 months or as needed for any cardiac related issues.  Gabriel was seen today for follow-up, coronary artery disease and atrial fibrillation.    Diagnoses and all orders for this visit:    Angina pectoris (CMS/HCC)  Stable at present with no recurrent symptoms  Coronary artery disease involving native coronary artery of native heart without angina pectoris  Stable with no anginal symptoms  Essential hypertension  Appears to be well controlled.  Permanent atrial fibrillation  Rate controlled.  Diabetes mellitus due to underlying condition with hyperosmolarity without coma, without long-term current use of insulin (CMS/HCC)  Monitored and managed by his primary care physician.  Morbidly obese (CMS/HCC)  [place] need for weight loss and increased activity.  Exudative age-related macular degeneration of right eye with active choroidal neovascularization (CMS/HCC)  Followed by his ophthalmologist  Type 2 diabetes mellitus with other circulatory complication, without long-term current use of insulin (CMS/[facility])  Followed by his primary care physician.      Return in about 6 months (around 8/14/2020).    MARY Salter

## 2020-04-06 RX ORDER — CHOLECALCIFEROL (VITAMIN D3) 125 MCG
1 CAPSULE ORAL DAILY
Qty: 90 TABLET | Refills: 1 | Status: SHIPPED | OUTPATIENT
Start: 2020-04-06 | End: 2021-10-05

## 2020-04-06 RX ORDER — POTASSIUM CHLORIDE 20 MEQ/1
20 TABLET, EXTENDED RELEASE ORAL DAILY
Qty: 90 TABLET | Refills: 1 | Status: SHIPPED | OUTPATIENT
Start: 2020-04-06 | End: 2020-09-14

## 2020-05-20 RX ORDER — TELMISARTAN AND HYDROCHLORTHIAZIDE 80; 25 MG/1; MG/1
1 TABLET ORAL DAILY
Qty: 90 TABLET | Refills: 3 | Status: SHIPPED | OUTPATIENT
Start: 2020-05-20 | End: 2021-06-02

## 2020-07-15 RX ORDER — APIXABAN 5 MG/1
TABLET, FILM COATED ORAL
Qty: 180 TABLET | Refills: 3 | Status: SHIPPED | OUTPATIENT
Start: 2020-07-15 | End: 2021-07-06

## 2020-08-13 ENCOUNTER — TELEPHONE (OUTPATIENT)
Dept: CARDIOLOGY | Facility: CLINIC | Age: 80
End: 2020-08-13

## 2020-08-13 RX ORDER — ATORVASTATIN CALCIUM 20 MG/1
20 TABLET, FILM COATED ORAL DAILY
Qty: 90 TABLET | Refills: 3 | Status: SHIPPED | OUTPATIENT
Start: 2020-08-13 | End: 2021-07-30

## 2020-08-14 ENCOUNTER — OFFICE VISIT (OUTPATIENT)
Dept: CARDIOLOGY | Facility: CLINIC | Age: 80
End: 2020-08-14

## 2020-08-14 VITALS
HEIGHT: 66 IN | BODY MASS INDEX: 34.23 KG/M2 | WEIGHT: 213 LBS | HEART RATE: 62 BPM | OXYGEN SATURATION: 96 % | DIASTOLIC BLOOD PRESSURE: 82 MMHG | SYSTOLIC BLOOD PRESSURE: 120 MMHG

## 2020-08-14 DIAGNOSIS — I48.21 PERMANENT ATRIAL FIBRILLATION (HCC): ICD-10-CM

## 2020-08-14 DIAGNOSIS — I20.9 ANGINA PECTORIS (HCC): Primary | ICD-10-CM

## 2020-08-14 DIAGNOSIS — I25.10 CORONARY ARTERY DISEASE INVOLVING NATIVE CORONARY ARTERY OF NATIVE HEART WITHOUT ANGINA PECTORIS: ICD-10-CM

## 2020-08-14 DIAGNOSIS — I10 ESSENTIAL HYPERTENSION: ICD-10-CM

## 2020-08-14 DIAGNOSIS — E78.5 DYSLIPIDEMIA: ICD-10-CM

## 2020-08-14 PROCEDURE — 99214 OFFICE O/P EST MOD 30 MIN: CPT | Performed by: NURSE PRACTITIONER

## 2020-08-14 NOTE — PROGRESS NOTES
"Gabriel Conley Jr. is a 79 y.o. male.  MRN #: [id number]    Referring Provider: Frank Torrez MD    Chief Complaint:   Chief Complaint   Patient presents with   • Follow-up   • Coronary Artery Disease   • Hypertension        History of Present Illness:   Mr Conley is a 79-year-old gentleman that presents for his 6-month cardiac follow-up.  Patient has history of CAD with prior three-vessel CABG in 2002, with coronary stent placement 2 months after his CABG.  Patient has history of atrial fibrillation.  Patient also reports a follow-up vasodilator nuclear stress test in 2015 which patient states \"it was okay\".  With today's evaluation patient reports no episodes of chest pain, pressure at exertion or with rest.  He denies any diaphoresis.  Denies any unusual shortness of breath or orthopnea.  Denies any weight increase or fluid retention.  His main symptom or complaint today is continued right upper gum and sinus pain related to recent oral surgery.    The patient presents today with their self who contributes to the history of their care.     The following portions of the patient's history were reviewed and updated as appropriate: allergies, current medications, past family history, past medical history, past social history, past surgical history and problem list.     Review of Systems:     Review of Systems   Constitutional: Positive for fatigue. Negative for activity change, appetite change, diaphoresis, unexpected weight gain and unexpected weight loss.   HENT: Positive for dental problem and mouth sores.    Eyes: Negative.  Negative for blurred vision, double vision, photophobia and visual disturbance.   Respiratory: Negative.  Negative for apnea, cough, chest tightness, shortness of breath and wheezing.    Cardiovascular: Positive for palpitations. Negative for chest pain and leg swelling.        History of atrial fibrillation, anticoagulated with Eliquis.  History of CAD with prior history of three-vessel CABG in " 2002.   Gastrointestinal: Negative.  Negative for abdominal distention, abdominal pain, blood in stool, nausea, vomiting, GERD and indigestion.   Endocrine: Negative.  Negative for cold intolerance, heat intolerance, polydipsia, polyphagia and polyuria.   Genitourinary: Negative.  Negative for decreased libido, frequency, genital sores, hematuria and urgency.   Musculoskeletal: Positive for arthralgias. Negative for back pain, joint swelling, myalgias, neck pain and neck stiffness.   Skin: Negative.  Negative for color change, pallor, rash and bruise.   Allergic/Immunologic: Negative.  Negative for environmental allergies, food allergies and immunocompromised state.   Neurological: Negative.  Negative for dizziness, tremors, seizures, syncope, facial asymmetry, speech difficulty, weakness, light-headedness and numbness.   Hematological: Negative.  Negative for adenopathy. Does not bruise/bleed easily.   Psychiatric/Behavioral: Negative.  Negative for agitation, decreased concentration, self-injury, sleep disturbance, suicidal ideas, negative for hyperactivity and stress. The patient is not nervous/anxious.    All other systems reviewed and are negative.         Current Outpatient Medications:   •  atorvastatin (LIPITOR) 20 MG tablet, Take 1 tablet by mouth Daily., Disp: 90 tablet, Rfl: 3  •  B-12, Methylcobalamin, 1000 MCG sublingual tablet, Place 1 tablet under the tongue Daily., Disp: 90 tablet, Rfl: 1  •  cholecalciferol (VITAMIN D3) 1000 units tablet, Take 1,000 Units by mouth Daily., Disp: , Rfl:   •  Coenzyme Q10 100 MG tablet, Take 200 mg by mouth Daily., Disp: , Rfl:   •  ELIQUIS 5 MG tablet tablet, TAKE 1 TABLET BY MOUTH TWICE A DAY, Disp: 180 tablet, Rfl: 3  •  fenofibrate (TRICOR) 145 MG tablet, Take 145 mg by mouth Daily., Disp: , Rfl:   •  furosemide (LASIX) 20 MG tablet, Take 1 tablet by mouth 3 (Three) Times a Week., Disp: 30 tablet, Rfl: 11  •  melatonin 5 MG tablet tablet, Take 5 mg by mouth Every  "Night., Disp: , Rfl:   •  metoprolol tartrate (LOPRESSOR) 50 MG tablet, TAKE 1 TABLET BY MOUTH TWICE A DAY, Disp: 180 tablet, Rfl: 3  •  Misc Natural Products (OSTEO BI-FLEX ADV DOUBLE ST PO), Take 1 tablet by mouth 2 (Two) Times a Day., Disp: , Rfl:   •  nitroglycerin (NITROSTAT) 0.4 MG SL tablet, Place 0.4 mg under the tongue Every 5 (Five) Minutes As Needed., Disp: , Rfl:   •  Nutritional Supplements (ENSURE HIGH PROTEIN PO), Take 8 oz by mouth Every Morning., Disp: , Rfl:   •  omeprazole (priLOSEC) 20 MG capsule, TAKE 1 CAPSULE BY MOUTH EVERY DAY, Disp: 90 capsule, Rfl: 3  •  potassium chloride (K-DUR,KLOR-CON) 20 MEQ CR tablet, Take 1 tablet by mouth Daily., Disp: 90 tablet, Rfl: 1  •  telmisartan-hydrochlorothiazide (MICARDIS HCT) 80-25 MG per tablet, Take 1 tablet by mouth Daily., Disp: 90 tablet, Rfl: 3    Vitals:    08/14/20 1109   BP: 120/82   BP Location: Right arm   Patient Position: Sitting   Cuff Size: Adult   Pulse: 62   SpO2: 96%   Weight: 96.6 kg (213 lb)   Height: 167.6 cm (66\")       Physical Exam:     Physical Exam   Constitutional: He is oriented to person, place, and time. He appears well-developed and well-nourished. No distress.   HENT:   Head: Normocephalic and atraumatic.   Mouth/Throat: Oropharynx is clear and moist.   Right upper gingiva with swelling with sutures intact.   Eyes: Conjunctivae are normal. No scleral icterus.   Neck: Normal range of motion. Neck supple. No JVD present. Carotid bruit is not present. No tracheal deviation present. No thyromegaly present.   Cardiovascular: Normal rate, regular rhythm, S1 normal, S2 normal, normal heart sounds and intact distal pulses. PMI is not displaced. Exam reveals no gallop and no friction rub.   No murmur heard.  Pulmonary/Chest: Effort normal and breath sounds normal. No respiratory distress. He has no wheezes. He has no rales. He exhibits no tenderness.   Abdominal: Soft. Bowel sounds are normal. He exhibits no mass. There is no " tenderness.   Musculoskeletal: Normal range of motion. He exhibits no edema.   Neurological: He is alert and oriented to person, place, and time. No sensory deficit.   Skin: Skin is warm and dry. Capillary refill takes less than 2 seconds. No rash noted. He is not diaphoretic.   Psychiatric: He has a normal mood and affect. His behavior is normal. Judgment and thought content normal.   Nursing note and vitals reviewed.      Procedures    Results:   Reviewed medication regimen and updated.  Reviewed medical, surgical, social history and updated.     Assessment/Plan:   No changes made in medication regimen.  Patient to follow-up in 6 months or as needed for any cardiac related issues with Dr.Breeding Rios was seen today for follow-up, coronary artery disease and hypertension.    Diagnoses and all orders for this visit:    Angina pectoris (CMS/[facility])  No reported symptoms of angina type chest discomfort, shortness of breath, palpitations, diaphoresis with this visit.  Nitroglycerin 0.4 mg sublingual as needed chest pain which patient reports she has not had to use.  Coronary artery disease involving native coronary artery of native heart without angina pectoris  Stable at present  Essential hypertension  Blood pressure 120/82.  Metoprolol tartrate 50 mg p.o. twice daily Micardis/hydrochlorothiazide 80/25 mg p.o. daily  Permanent atrial fibrillation (CMS/HCC)  Stable controlled rate.  Dyslipidemia  Monitored and managed by PCP.  Atorvastatin 20 mg p.o. nightly      Return in about 1 year (around 8/14/2021) for F/U with Neelam.    MARY Salter

## 2020-08-19 ENCOUNTER — HOSPITAL ENCOUNTER (OUTPATIENT)
Dept: GENERAL RADIOLOGY | Facility: HOSPITAL | Age: 80
Discharge: HOME | End: 2020-08-19
Admitting: INTERNAL MEDICINE

## 2020-08-19 ENCOUNTER — OFFICE VISIT (OUTPATIENT)
Dept: INTERNAL MEDICINE | Facility: CLINIC | Age: 80
End: 2020-08-19

## 2020-08-19 VITALS
BODY MASS INDEX: 34.2 KG/M2 | HEIGHT: 66 IN | TEMPERATURE: 98 F | RESPIRATION RATE: 16 BRPM | HEART RATE: 58 BPM | DIASTOLIC BLOOD PRESSURE: 65 MMHG | SYSTOLIC BLOOD PRESSURE: 121 MMHG | OXYGEN SATURATION: 96 % | WEIGHT: 212.8 LBS
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
## 2020-08-19 DIAGNOSIS — M79.642 PAIN OF LEFT HAND: ICD-10-CM

## 2020-08-19 DIAGNOSIS — K12.2 ORAL ABSCESS: Primary | ICD-10-CM

## 2020-08-19 DIAGNOSIS — M25.562 ACUTE PAIN OF LEFT KNEE: ICD-10-CM

## 2020-08-19 DIAGNOSIS — S62.92XA CLOSED FRACTURE OF LEFT HAND, INITIAL ENCOUNTER: Primary | ICD-10-CM

## 2020-08-19 PROCEDURE — 73560 X-RAY EXAM OF KNEE 1 OR 2: CPT

## 2020-08-19 PROCEDURE — 73130 X-RAY EXAM OF HAND: CPT

## 2020-08-19 PROCEDURE — 99214 OFFICE O/P EST MOD 30 MIN: CPT | Performed by: INTERNAL MEDICINE

## 2020-08-19 RX ORDER — CLINDAMYCIN HYDROCHLORIDE 150 MG/1
150 CAPSULE ORAL 4 TIMES DAILY
Qty: 40 CAPSULE | Refills: 0 | Status: SHIPPED | OUTPATIENT
Start: 2020-08-19 | End: 2020-10-07

## 2020-08-19 RX ORDER — CIPROFLOXACIN 500 MG/1
500 TABLET, FILM COATED ORAL 2 TIMES DAILY
Qty: 20 TABLET | Refills: 0 | Status: SHIPPED | OUTPATIENT
Start: 2020-08-19 | End: 2020-10-07

## 2020-08-19 NOTE — PROGRESS NOTES
Subjective     Patient ID: Gabriel Conley Jr. is a 79 y.o. male. Patient is here for management of multiple medical problems.     Chief Complaint   Patient presents with   • Facial Swelling     patient states since 02/08/2020 he has had 3 root canals and on Monday he had bone spurs scrapped from his jaw, patient having facial/nasal swelling and severe pain. Patient saw his cardiologist on 8/14/2020 and was told he may have a sinus infection also.      History of Present Illness     Pt with 3 teeth pulled in feb and now with swelling and pain.  DR [place] in [place] pulled the teeth.  All started feb 8 due to infection in tooth.  Pt never on abx.     Puss coming out of nasal cavity.    Had bones scrapped.  Burrs removed.   Sutures removed a week ago.        Left hand with pain from fall 2 weeks ago.  Still with sig pian in knee and hand left side.          The following portions of the patient's history were reviewed and updated as appropriate: allergies, current medications, past family history, past medical history, past social history, past surgical history and problem list.    Review of Systems   Constitutional: Positive for fatigue.   HENT: Positive for dental problem, facial swelling and sinus pressure.    Gastrointestinal: Negative for anal bleeding, blood in stool and constipation.   All other systems reviewed and are negative.      Current Outpatient Medications:   •  atorvastatin (LIPITOR) 20 MG tablet, Take 1 tablet by mouth Daily., Disp: 90 tablet, Rfl: 3  •  B-12, Methylcobalamin, 1000 MCG sublingual tablet, Place 1 tablet under the tongue Daily., Disp: 90 tablet, Rfl: 1  •  cholecalciferol (VITAMIN D3) 1000 units tablet, Take 1,000 Units by mouth Daily., Disp: , Rfl:   •  Coenzyme Q10 100 MG tablet, Take 200 mg by mouth Daily., Disp: , Rfl:   •  ELIQUIS 5 MG tablet tablet, TAKE 1 TABLET BY MOUTH TWICE A DAY, Disp: 180 tablet, Rfl: 3  •  fenofibrate (TRICOR) 145 MG tablet, Take 145 mg by mouth Daily.,  "Disp: , Rfl:   •  furosemide (LASIX) 20 MG tablet, Take 1 tablet by mouth 3 (Three) Times a Week., Disp: 30 tablet, Rfl: 11  •  melatonin 5 MG tablet tablet, Take 5 mg by mouth Every Night., Disp: , Rfl:   •  metoprolol tartrate (LOPRESSOR) 50 MG tablet, TAKE 1 TABLET BY MOUTH TWICE A DAY, Disp: 180 tablet, Rfl: 3  •  Misc Natural Products (OSTEO BI-FLEX ADV DOUBLE ST PO), Take 1 tablet by mouth 2 (Two) Times a Day., Disp: , Rfl:   •  nitroglycerin (NITROSTAT) 0.4 MG SL tablet, Place 0.4 mg under the tongue Every 5 (Five) Minutes As Needed., Disp: , Rfl:   •  Nutritional Supplements (ENSURE HIGH PROTEIN PO), Take 8 oz by mouth Every Morning., Disp: , Rfl:   •  omeprazole (priLOSEC) 20 MG capsule, TAKE 1 CAPSULE BY MOUTH EVERY DAY, Disp: 90 capsule, Rfl: 3  •  potassium chloride (K-DUR,KLOR-CON) 20 MEQ CR tablet, Take 1 tablet by mouth Daily., Disp: 90 tablet, Rfl: 1  •  telmisartan-hydrochlorothiazide (MICARDIS HCT) 80-25 MG per tablet, Take 1 tablet by mouth Daily., Disp: 90 tablet, Rfl: 3  •  ciprofloxacin (Cipro) 500 MG tablet, Take 1 tablet by mouth 2 (Two) Times a Day., Disp: 20 tablet, Rfl: 0  •  clindamycin (CLEOCIN) 150 MG capsule, Take 1 capsule by mouth 4 (Four) Times a Day., Disp: 40 capsule, Rfl: 0    Objective      Blood pressure 121/65, pulse 58, temperature 98 °F (36.7 °C), temperature source Temporal, resp. rate 16, height 167.6 cm (66\"), weight 96.5 kg (212 lb 12.8 oz), SpO2 96 %.    Physical Exam     General Appearance:    Alert, cooperative, no distress, appears stated age   Head:    Normocephalic, without obvious abnormality, atraumatic   Eyes:    PERRL, conjunctiva/corneas clear, EOM's intact   Ears:    Normal TM's and external ear canals, both ears   Nose:   Nares normal, septum midline, mucosa normal, no drainage   or sinus tenderness   Throat:   teeth pulled on right upper right jaw. No puss.      Neck:   Supple, symmetrical, trachea midline, no adenopathy;        thyroid:  No " enlargement/tenderness/nodules; no carotid    bruit or JVD   Back:     Symmetric, no curvature, ROM normal, no CVA tenderness   Lungs:     Clear to auscultation bilaterally, respirations unlabored   Chest wall:    No tenderness or deformity   Heart:    Regular rate and rhythm, S1 and S2 normal, no murmur,        rub or gallop   Abdomen:     Soft, non-tender, bowel sounds active all four quadrants,     no masses, no organomegaly   Extremities:   ttp medial hand. Close to wirst.  ttp knee left side.     Pulses:   2+ and symmetric all extremities   Skin:   Skin color, texture, turgor normal, no rashes or lesions   Lymph nodes:   Cervical, supraclavicular, and axillary nodes normal   Neurologic:   CNII-XII intact. Normal strength, sensation and reflexes       throughout      Results for orders placed or performed in visit on 12/11/19   TSH   Result Value Ref Range    TSH 3.120 0.270 - 4.200 uIU/mL   T4, Free   Result Value Ref Range    Free T4 1.26 0.93 - 1.70 ng/dL   Comprehensive Metabolic Panel   Result Value Ref Range    Glucose 89 65 - 99 mg/dL    BUN 17 8 - 23 mg/dL    Creatinine 1.18 0.76 - 1.27 mg/dL    eGFR Non African Am 60 (L) >60 mL/min/1.73    eGFR African Am 72 >60 mL/min/1.73    BUN/Creatinine Ratio 14.4 7.0 - 25.0    Sodium 141 136 - 145 mmol/L    Potassium 4.0 3.5 - 5.2 mmol/L    Chloride 100 98 - 107 mmol/L    Total CO2 30.5 (H) 22.0 - 29.0 mmol/L    Calcium 9.6 8.6 - 10.5 mg/dL    Total Protein 7.0 6.0 - 8.5 g/dL    Albumin 4.30 3.50 - 5.20 g/dL    Globulin 2.7 gm/dL    A/G Ratio 1.6 g/dL    Total Bilirubin 0.4 0.2 - 1.2 mg/dL    Alkaline Phosphatase 69 39 - 117 U/L    AST (SGOT) 22 1 - 40 U/L    ALT (SGPT) 21 1 - 41 U/L   Vitamin B12   Result Value Ref Range    Vitamin B-12 1,193 (H) 211 - 946 pg/mL   Lipid Panel   Result Value Ref Range    Total Cholesterol 175 0 - 200 mg/dL    Triglycerides 358 (H) 0 - 150 mg/dL    HDL Cholesterol 34 (L) 40 - 60 mg/dL    VLDL Cholesterol 71.6 mg/dL    LDL  Cholesterol  69 0 - 100 mg/dL   PSA Screen   Result Value Ref Range    PSA 6.550 (H) 0.000 - 4.000 ng/mL   [place] Spotted Fever, IgM   Result Value Ref Range    RMSF IgM 2.24 (H) 0.00 - 0.89 index   [facility] Spotted Fever, IgG   Result Value Ref Range    RMSF IgG Negative Negative   Lyme Disease, PCR   Result Value Ref Range    Lyme Disease(B.burgdorferi)PCR Negative Negative   Ehrlichia Antibody Panel   Result Value Ref Range    E. chaffeensis ([facility]) IgG Titer Negative Neg:<1:64    E. chaffeensis ([facility]) IgM Titer Negative Neg:<1:20    HGE IgG Titer Negative Neg:<1:64    [facility] IgM Titer Negative Neg:<1:20   Cyclic Citrul Peptide Antibody, IgG / IgA   Result Value Ref Range    CCP Antibodies IgG/IgA 48 (H) 0 - 19 units   Rheumatoid Factor   Result Value Ref Range    RA Latex Turbid <10.0 0.0 - 13.9 IU/mL   Sedimentation Rate   Result Value Ref Range    Sed Rate 14 0 - 20 mm/hr   Uric Acid   Result Value Ref Range    Uric Acid 7.6 (H) 3.4 - 7.0 mg/dL   C-reactive Protein   Result Value Ref Range    C-Reactive Protein 0.31 0.00 - 0.50 mg/dL   Antistreptolysin O Titer   Result Value Ref Range    ASO <20.0 0.0 - 200.0 IU/mL   Hemoglobin A1c   Result Value Ref Range    Hemoglobin A1C 6.10 (H) 4.80 - 5.60 %   MicroAlbumin, Urine, Random - Urine, Clean Catch   Result Value Ref Range    Microalbumin, Urine 7.2 Not Estab. ug/mL   CBC & Differential   Result Value Ref Range    WBC 8.42 3.40 - 10.80 10*3/mm3    RBC 4.83 4.14 - 5.80 10*6/mm3    Hemoglobin 15.3 13.0 - 17.7 g/dL    Hematocrit 43.3 37.5 - 51.0 %    MCV 89.6 79.0 - 97.0 fL    MCH 31.7 26.6 - 33.0 pg    MCHC 35.3 31.5 - 35.7 g/dL    RDW 13.5 12.3 - 15.4 %    Platelets 150 140 - 450 10*3/mm3    Neutrophil Rel % 55.9 42.7 - 76.0 %    Lymphocyte Rel % 32.3 19.6 - 45.3 %    Monocyte Rel % 8.2 5.0 - 12.0 %    Eosinophil Rel % 2.7 0.3 - 6.2 %    Basophil Rel % 0.5 0.0 - 1.5 %    Neutrophils Absolute 4.71 1.70 - 7.00 10*3/mm3    Lymphocytes Absolute 2.72 0.70 - 3.10  10*3/mm3    Monocytes Absolute 0.69 0.10 - 0.90 10*3/mm3    Eosinophils Absolute 0.23 0.00 - 0.40 10*3/mm3    Basophils Absolute 0.04 0.00 - 0.20 10*3/mm3    Immature Granulocyte Rel % 0.4 0.0 - 0.5 %    Immature Grans Absolute 0.03 0.00 - 0.05 10*3/mm3    nRBC 0.0 0.0 - 0.2 /100 WBC         Assessment/Plan   consicer OM in facial bones at this point.  Start cipro. Consider 6weeks abx if OM.         Gabriel was seen today for facial swelling.    Diagnoses and all orders for this visit:    Oral abscess  -     Ambulatory Referral to Oral Maxillofacial Surgery  -     clindamycin (CLEOCIN) 150 MG capsule; Take 1 capsule by mouth 4 (Four) Times a Day.  -     ciprofloxacin (Cipro) 500 MG tablet; Take 1 tablet by mouth 2 (Two) Times a Day.    Fall, initial encounter    Pain of left hand  -     XR Hand 3+ View Left; Future    Acute pain of left knee  -     XR Knee 1 or 2 View Left; Future      Return in about 1 week (around 8/26/2020).          There are no Patient Instructions on file for this visit.     Frank Torrez MD    Assessment/Plan

## 2020-08-25 ENCOUNTER — OFFICE VISIT (OUTPATIENT)
Dept: ORTHOPEDIC SURGERY | Facility: CLINIC | Age: 80
End: 2020-08-25

## 2020-08-25 VITALS — HEIGHT: 66 IN | OXYGEN SATURATION: 99 % | BODY MASS INDEX: 34.19 KG/M2 | HEART RATE: 54 BPM | WEIGHT: 212.74 LBS

## 2020-08-25 DIAGNOSIS — S62.317A CLOSED DISPLACED FRACTURE OF BASE OF FIFTH METACARPAL BONE OF LEFT HAND, INITIAL ENCOUNTER: Primary | ICD-10-CM

## 2020-08-25 PROCEDURE — 99204 OFFICE O/P NEW MOD 45 MIN: CPT | Performed by: ORTHOPAEDIC SURGERY

## 2020-08-25 NOTE — PROGRESS NOTES
"    [facility]        Subjective     Pain of the Left Hand      HPI    Gabriel Conley Jr. is a 79 y.o. male who presents with new problem of: left hand pain.  Onset: direct blow. The issue has been ongoing for 3 week(s). Pain is a 2/10 on the pain scale. Pain is described as aching. Associated symptoms include pain, swelling and stiffness. The pain is worse with activity, resting, ice and the hand glove improve the pain. Previous treatments have included: bracing and NSAIDS.    I have reviewed the following portions of the patient's history:History of Present Illnessand review of systems.    Patient is here today for an injury to his left hand.  This occurred about 3 weeks ago.  Patient fell and injured the left hand.  At age 17, he sustained an injury to his left small digit and he was placed in a splint and he remove the splint and the finger healed crooked.  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  He says he has minimal use of the ulnar side of his left hand at his baseline.  He is here at the request of Dr. Torrez for further evaluation and treatment.      Past Medical History:   Diagnosis Date   • Acid reflux    • Acid reflux    • Arthritis    • Atrial fibrillation (CMS/HCC)    • Cataract     BILATERAL   • Coronary artery disease    • Glaucoma    • Heart disease    • Heart murmur    • History of nuclear stress test 2015    DR. AMBROSE-\"IT WAS OK\"   • [place] (hard of hearing)    • Kidney stone    • Macular degeneration    • Melena 9/9/2016   • Sleep apnea     NO CPAP   • Wears glasses       Past Surgical History:   Procedure Laterality Date   • CARDIAC CATHETERIZATION      X2   • CARDIAC SURGERY     • CATARACT EXTRACTION Bilateral    • COLONOSCOPY  2000   • COLONOSCOPY N/A 3/13/2018    Procedure: COLONOSCOPY WITH POLYPECTOMY;  Surgeon: Ifeanyi Conley MD;  Location: [facility] ENDOSCOPY;  Service: Gastroenterology   • CORONARY ARTERY BYPASS GRAFT  2002     Coronary Artery Triple " Arterial Bypass Graft   • CORONARY STENT PLACEMENT      X2   • ENDOSCOPY     • INGUINAL HERNIA REPAIR Left    • ROOT CANAL  09/22/2019   • ROOT CANAL  02/08/2020      Family History   Problem Relation Age of Onset   • Stroke Other    • Hypertension Other    • Diabetes Other    • Arthritis Other    • Heart attack Other    • Hypertension Mother    • Cancer Mother    • Arthritis Mother    • Heart attack Mother    • Stroke Father      Social History     Socioeconomic History   • Marital status:      Spouse name: Not on file   • Number of children: Not on file   • Years of education: Not on file   • Highest education level: Not on file   Tobacco Use   • Smoking status: Never Smoker   • Smokeless tobacco: Never Used   Substance and Sexual Activity   • Alcohol use: No   • Drug use: No   • Sexual activity: Defer      Current Outpatient Medications on File Prior to Visit   Medication Sig Dispense Refill   • atorvastatin (LIPITOR) 20 MG tablet Take 1 tablet by mouth Daily. 90 tablet 3   • B-12, Methylcobalamin, 1000 MCG sublingual tablet Place 1 tablet under the tongue Daily. 90 tablet 1   • cholecalciferol (VITAMIN D3) 1000 units tablet Take 1,000 Units by mouth Daily.     • ciprofloxacin (Cipro) 500 MG tablet Take 1 tablet by mouth 2 (Two) Times a Day. 20 tablet 0   • clindamycin (CLEOCIN) 150 MG capsule Take 1 capsule by mouth 4 (Four) Times a Day. 40 capsule 0   • Coenzyme Q10 100 MG tablet Take 200 mg by mouth Daily.     • ELIQUIS 5 MG tablet tablet TAKE 1 TABLET BY MOUTH TWICE A  tablet 3   • fenofibrate (TRICOR) 145 MG tablet Take 145 mg by mouth Daily.     • furosemide (LASIX) 20 MG tablet Take 1 tablet by mouth 3 (Three) Times a Week. 30 tablet 11   • melatonin 5 MG tablet tablet Take 5 mg by mouth Every Night.     • metoprolol tartrate (LOPRESSOR) 50 MG tablet TAKE 1 TABLET BY MOUTH TWICE A  tablet 3   • Misc Natural Products (OSTEO BI-FLEX ADV DOUBLE ST PO) Take 1 tablet by mouth 2 (Two) Times  "a Day.     • nitroglycerin (NITROSTAT) 0.4 MG SL tablet Place 0.4 mg under the tongue Every 5 (Five) Minutes As Needed.     • Nutritional Supplements (ENSURE HIGH PROTEIN PO) Take 8 oz by mouth Every Morning.     • omeprazole (priLOSEC) 20 MG capsule TAKE 1 CAPSULE BY MOUTH EVERY DAY 90 capsule 3   • potassium chloride (K-DUR,KLOR-CON) 20 MEQ CR tablet Take 1 tablet by mouth Daily. 90 tablet 1   • telmisartan-hydrochlorothiazide (MICARDIS HCT) 80-25 MG per tablet Take 1 tablet by mouth Daily. 90 tablet 3     No current facility-administered medications on file prior to visit.       Allergies   Allergen Reactions   • Hydrocodone Delirium     NAUSEA AND VOMITING     • Xarelto [Rivaroxaban] Other (See Comments)     Bleeding gums          Review of Systems   Constitutional: Negative.    HENT: Positive for dental problem and sinus pressure.    Eyes: Negative.    Respiratory: Negative.    Cardiovascular: Negative.    Gastrointestinal: Negative.    Endocrine: Negative.    Genitourinary: Negative.    Musculoskeletal: Positive for arthralgias.   Skin: Negative.    Allergic/Immunologic: Negative.    Neurological: Negative.    Hematological: Negative.    Psychiatric/Behavioral: Negative.         I reviewed the patient's chief complaint, history of present illness, review of systems, past medical history, surgical history, family history, social history, medications and allergy list.        Objective      Physical Exam  Pulse 54   Ht 167.6 cm (65.98\")   Wt 96.5 kg (212 lb 11.9 oz)   SpO2 99%   BMI 34.35 kg/m²     Body mass index is 34.35 kg/m².    General  Mental Status - alert  General Appearance - cooperative, well groomed, not in acute distress  Orientation - Oriented X3  Build & Nutrition - well developed and well nourished  Posture - normal posture  Gait - normal gait     Integumentary  Global Assessment  Examination of related systems reveals - no lymphadenopathy  Ears:  No abnormality  Nose:  No mucous " drainage  General Characteristics  Overall examination of the patient's skin reveals - no rashes, no evidence of scars, no suspicious lesions and no bruises.  Color - normal coloration of skin.  Vascular: Brisk capillary refill in all extremities    Ortho Exam  Left hand: Patient is tender at the base of the fifth metacarpal.  He lacks full tip to palm flexion by 5 cm at least.  There is limited motion at the IP joints of the ring digit.  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  This is not passively correctable.    Imaging/Studies  Imaging Results (Last 24 Hours)     ** No results found for the last 24 hours. **      We reviewed images and report of an x-ray from 8/19/2020.  Patient has a mildly displaced base of the fifth metacarpal fracture.  There are multiple areas throughout the left hand with degenerative changes.      IMPRESSION:  Fracture involving the base of the fifth metacarpal with no  significant displacement. There is extensive degenerative changes seen  of the second metacarpophalangeal joint, first carpometacarpal joint and  interphalangeal joints.     D:  08/19/2020  E:  08/19/2020     This report was finalized on 8/19/2020 11:08 AM by Dr. Bambi Tejeda MD.           Assessment    Assessment:  1. Closed displaced fracture of base of fifth metacarpal bone of left hand, initial encounter        Plan:  1. Continue over-the-counter medication as needed for discomfort  2. Left base of the fifth metacarpal fracture--plan will be for nonoperative treatment.  The injury is already 3 weeks old.  I have suggested putting him into a removable eXOs splint rather than something nonremovable.  I will see him back in 3 weeks with a x-ray of the hand and we will begin weaning from the splint at that point and I am doubtful that physical therapy will be something he is interested in.        Mitch Ley MD  08/25/20  09:33

## 2020-08-26 ENCOUNTER — OFFICE VISIT (OUTPATIENT)
Dept: INTERNAL MEDICINE | Facility: CLINIC | Age: 80
End: 2020-08-26

## 2020-08-26 VITALS
TEMPERATURE: 97.5 F | HEIGHT: 66 IN | DIASTOLIC BLOOD PRESSURE: 64 MMHG | RESPIRATION RATE: 16 BRPM | WEIGHT: 217.4 LBS | OXYGEN SATURATION: 97 % | BODY MASS INDEX: 34.94 KG/M2 | HEART RATE: 51 BPM | SYSTOLIC BLOOD PRESSURE: 124 MMHG

## 2020-08-26 DIAGNOSIS — S62.92XD CLOSED FRACTURE OF LEFT HAND WITH ROUTINE HEALING, SUBSEQUENT ENCOUNTER: Primary | ICD-10-CM

## 2020-08-26 DIAGNOSIS — Z12.5 PROSTATE CANCER SCREENING: ICD-10-CM

## 2020-08-26 PROCEDURE — 99213 OFFICE O/P EST LOW 20 MIN: CPT | Performed by: INTERNAL MEDICINE

## 2020-08-26 NOTE — PROGRESS NOTES
Subjective     Patient ID: Gabriel Conley Jr. is a 79 y.o. male. Patient is here for management of multiple medical problems.     Chief Complaint   Patient presents with   • oral abscess     one week follow-up, patient states he is doing better on antibiotics   • Hand Pain     patient states the pain is some better,  now has a cast on the left wrist due to fracture      History of Present Illness       Pt doing better on ABX.   Feels better. Not enetal work done     Pt with recent hand fx. Has splint on.     The following portions of the patient's history were reviewed and updated as appropriate: allergies, current medications, past family history, past medical history, past social history, past surgical history and problem list.    Review of Systems   Constitutional: Negative for fatigue.   Musculoskeletal: Negative for myalgias and neck pain.   Psychiatric/Behavioral: Negative for self-injury and sleep disturbance. The patient is not nervous/anxious.    All other systems reviewed and are negative.      Current Outpatient Medications:   •  atorvastatin (LIPITOR) 20 MG tablet, Take 1 tablet by mouth Daily., Disp: 90 tablet, Rfl: 3  •  B-12, Methylcobalamin, 1000 MCG sublingual tablet, Place 1 tablet under the tongue Daily., Disp: 90 tablet, Rfl: 1  •  cholecalciferol (VITAMIN D3) 1000 units tablet, Take 1,000 Units by mouth Daily., Disp: , Rfl:   •  ciprofloxacin (Cipro) 500 MG tablet, Take 1 tablet by mouth 2 (Two) Times a Day., Disp: 20 tablet, Rfl: 0  •  clindamycin (CLEOCIN) 150 MG capsule, Take 1 capsule by mouth 4 (Four) Times a Day., Disp: 40 capsule, Rfl: 0  •  Coenzyme Q10 100 MG tablet, Take 200 mg by mouth Daily., Disp: , Rfl:   •  ELIQUIS 5 MG tablet tablet, TAKE 1 TABLET BY MOUTH TWICE A DAY, Disp: 180 tablet, Rfl: 3  •  fenofibrate (TRICOR) 145 MG tablet, Take 145 mg by mouth Daily., Disp: , Rfl:   •  furosemide (LASIX) 20 MG tablet, Take 1 tablet by mouth 3 (Three) Times a Week., Disp: 30 tablet, Rfl:  "11  •  melatonin 5 MG tablet tablet, Take 5 mg by mouth Every Night., Disp: , Rfl:   •  metoprolol tartrate (LOPRESSOR) 50 MG tablet, TAKE 1 TABLET BY MOUTH TWICE A DAY, Disp: 180 tablet, Rfl: 3  •  Misc Natural Products (OSTEO BI-FLEX ADV DOUBLE ST PO), Take 1 tablet by mouth 2 (Two) Times a Day., Disp: , Rfl:   •  nitroglycerin (NITROSTAT) 0.4 MG SL tablet, Place 0.4 mg under the tongue Every 5 (Five) Minutes As Needed., Disp: , Rfl:   •  Nutritional Supplements (ENSURE HIGH PROTEIN PO), Take 8 oz by mouth Every Morning., Disp: , Rfl:   •  omeprazole (priLOSEC) 20 MG capsule, TAKE 1 CAPSULE BY MOUTH EVERY DAY, Disp: 90 capsule, Rfl: 3  •  potassium chloride (K-DUR,KLOR-CON) 20 MEQ CR tablet, Take 1 tablet by mouth Daily., Disp: 90 tablet, Rfl: 1  •  telmisartan-hydrochlorothiazide (MICARDIS HCT) 80-25 MG per tablet, Take 1 tablet by mouth Daily., Disp: 90 tablet, Rfl: 3    Objective      Blood pressure 124/64, pulse 51, temperature 97.5 °F (36.4 °C), temperature source Temporal, resp. rate 16, height 167.6 cm (66\"), weight 98.6 kg (217 lb 6.4 oz), SpO2 97 %.    Physical Exam     General Appearance:    Alert, cooperative, no distress, appears stated age   Head:    Normocephalic, without obvious abnormality, atraumatic   Eyes:    PERRL, conjunctiva/corneas clear, EOM's intact   Ears:    Normal TM's and external ear canals, both ears   Nose:   Nares normal, septum midline, mucosa normal, no drainage   or sinus tenderness   Throat:   Lips, mucosa, and tongue normal; teeth and gums normal   Neck:   Supple, symmetrical, trachea midline, no adenopathy;        thyroid:  No enlargement/tenderness/nodules; no carotid    bruit or JVD   Back:     Symmetric, no curvature, ROM normal, no CVA tenderness   Lungs:     Clear to auscultation bilaterally, respirations unlabored   Chest wall:    No tenderness or deformity   Heart:    Regular rate and rhythm, S1 and S2 normal, no murmur,        rub or gallop   Abdomen:     Soft, " non-tender, bowel sounds active all four quadrants,     no masses, no organomegaly   Extremities:   Extremities normal, atraumatic, no cyanosis or edema   Pulses:   2+ and symmetric all extremities   Skin:   Skin color, texture, turgor normal, no rashes or lesions   Lymph nodes:   Cervical, supraclavicular, and axillary nodes normal   Neurologic:   CNII-XII intact. Normal strength, sensation and reflexes       throughout      Results for orders placed or performed in visit on 12/11/19   TSH   Result Value Ref Range    TSH 3.120 0.270 - 4.200 uIU/mL   T4, Free   Result Value Ref Range    Free T4 1.26 0.93 - 1.70 ng/dL   Comprehensive Metabolic Panel   Result Value Ref Range    Glucose 89 65 - 99 mg/dL    BUN 17 8 - 23 mg/dL    Creatinine 1.18 0.76 - 1.27 mg/dL    eGFR Non African Am 60 (L) >60 mL/min/1.73    eGFR African Am 72 >60 mL/min/1.73    BUN/Creatinine Ratio 14.4 7.0 - 25.0    Sodium 141 136 - 145 mmol/L    Potassium 4.0 3.5 - 5.2 mmol/L    Chloride 100 98 - 107 mmol/L    Total CO2 30.5 (H) 22.0 - 29.0 mmol/L    Calcium 9.6 8.6 - 10.5 mg/dL    Total Protein 7.0 6.0 - 8.5 g/dL    Albumin 4.30 3.50 - 5.20 g/dL    Globulin 2.7 gm/dL    A/G Ratio 1.6 g/dL    Total Bilirubin 0.4 0.2 - 1.2 mg/dL    Alkaline Phosphatase 69 39 - 117 U/L    AST (SGOT) 22 1 - 40 U/L    ALT (SGPT) 21 1 - 41 U/L   Vitamin B12   Result Value Ref Range    Vitamin B-12 1,193 (H) 211 - 946 pg/mL   Lipid Panel   Result Value Ref Range    Total Cholesterol 175 0 - 200 mg/dL    Triglycerides 358 (H) 0 - 150 mg/dL    HDL Cholesterol 34 (L) 40 - 60 mg/dL    VLDL Cholesterol 71.6 mg/dL    LDL Cholesterol  69 0 - 100 mg/dL   PSA Screen   Result Value Ref Range    PSA 6.550 (H) 0.000 - 4.000 ng/mL   [place] Spotted Fever, IgM   Result Value Ref Range    RMSF IgM 2.24 (H) 0.00 - 0.89 index   [place] Spotted Fever, IgG   Result Value Ref Range    RMSF IgG Negative Negative   Lyme Disease, PCR   Result Value Ref Range    Lyme  Disease(B.burgdorferi)PCR Negative Negative   Ehrlichia Antibody Panel   Result Value Ref Range    E. chaffeensis (HME) IgG Titer Negative Neg:<1:64    E. chaffeensis (HME) IgM Titer Negative Neg:<1:20    HGE IgG Titer Negative Neg:<1:64    HGE IgM Titer Negative Neg:<1:20   Cyclic Citrul Peptide Antibody, IgG / IgA   Result Value Ref Range    CCP Antibodies IgG/IgA 48 (H) 0 - 19 units   Rheumatoid Factor   Result Value Ref Range    RA Latex Turbid <10.0 0.0 - 13.9 IU/mL   Sedimentation Rate   Result Value Ref Range    Sed Rate 14 0 - 20 mm/hr   Uric Acid   Result Value Ref Range    Uric Acid 7.6 (H) 3.4 - 7.0 mg/dL   C-reactive Protein   Result Value Ref Range    C-Reactive Protein 0.31 0.00 - 0.50 mg/dL   Antistreptolysin O Titer   Result Value Ref Range    ASO <20.0 0.0 - 200.0 IU/mL   Hemoglobin A1c   Result Value Ref Range    Hemoglobin A1C 6.10 (H) 4.80 - 5.60 %   MicroAlbumin, Urine, Random - Urine, Clean Catch   Result Value Ref Range    Microalbumin, Urine 7.2 Not Estab. ug/mL   CBC & Differential   Result Value Ref Range    WBC 8.42 3.40 - 10.80 10*3/mm3    RBC 4.83 4.14 - 5.80 10*6/mm3    Hemoglobin 15.3 13.0 - 17.7 g/dL    Hematocrit 43.3 37.5 - 51.0 %    MCV 89.6 79.0 - 97.0 fL    MCH 31.7 26.6 - 33.0 pg    MCHC 35.3 31.5 - 35.7 g/dL    RDW 13.5 12.3 - 15.4 %    Platelets 150 140 - 450 10*3/mm3    Neutrophil Rel % 55.9 42.7 - 76.0 %    Lymphocyte Rel % 32.3 19.6 - 45.3 %    Monocyte Rel % 8.2 5.0 - 12.0 %    Eosinophil Rel % 2.7 0.3 - 6.2 %    Basophil Rel % 0.5 0.0 - 1.5 %    Neutrophils Absolute 4.71 1.70 - 7.00 10*3/mm3    Lymphocytes Absolute 2.72 0.70 - 3.10 10*3/mm3    Monocytes Absolute 0.69 0.10 - 0.90 10*3/mm3    Eosinophils Absolute 0.23 0.00 - 0.40 10*3/mm3    Basophils Absolute 0.04 0.00 - 0.20 10*3/mm3    Immature Granulocyte Rel % 0.4 0.0 - 0.5 %    Immature Grans Absolute 0.03 0.00 - 0.05 10*3/mm3    nRBC 0.0 0.0 - 0.2 /100 WBC         Assessment/Plan   Pt on vit d with rosalind and vit c. For  bone healing    abcess better.      Gabriel was seen today for oral abscess and hand pain.    Diagnoses and all orders for this visit:    Closed fracture of left hand with routine healing, subsequent encounter    Prostate cancer screening  -     PSA Screen      Return in about 4 weeks (around 9/23/2020) for Medicare Wellness.          There are no Patient Instructions on file for this visit.     Frank Torrez MD    Assessment/Plan

## 2020-09-14 RX ORDER — POTASSIUM CHLORIDE 1500 MG/1
TABLET, EXTENDED RELEASE ORAL
Qty: 90 TABLET | Refills: 0 | Status: SHIPPED | OUTPATIENT
Start: 2020-09-14 | End: 2020-10-12

## 2020-09-15 ENCOUNTER — OFFICE VISIT (OUTPATIENT)
Dept: ORTHOPEDIC SURGERY | Facility: CLINIC | Age: 80
End: 2020-09-15

## 2020-09-15 VITALS — BODY MASS INDEX: 34.93 KG/M2 | WEIGHT: 217.37 LBS | OXYGEN SATURATION: 98 % | HEIGHT: 66 IN | HEART RATE: 72 BPM

## 2020-09-15 DIAGNOSIS — M25.642 STIFFNESS OF FINGER JOINT OF LEFT HAND: ICD-10-CM

## 2020-09-15 DIAGNOSIS — S62.317D CLOSED DISPLACED FRACTURE OF BASE OF FIFTH METACARPAL BONE OF LEFT HAND WITH ROUTINE HEALING, SUBSEQUENT ENCOUNTER: Primary | ICD-10-CM

## 2020-09-15 PROCEDURE — 99213 OFFICE O/P EST LOW 20 MIN: CPT | Performed by: PHYSICIAN ASSISTANT

## 2020-09-15 NOTE — PROGRESS NOTES
"    [facility]        Subjective     CC: Follow-up (3 wek f/u; Closed displaced fracture of base of fifth metacarpal bone of left hand)      BIRDIE Conley Jr. is a 79 y.o. male.  Patient returns for follow-up of his left hand fifth metacarpal fracture.  He has been in an Exos brace for the last 3 weeks.  He notes worsening of pain while in the brace however if he removes the brace and puts on an arthritic glove he notes significant improvement of pain.  In addition to his hand pain from the brace he also notes stiffness/pain of the ring finger secondary to wearing the brace.    Currently endorses a pain scale maximum 2/10.  No reported numbness or tingling.    Overall, patient's symptoms are improved.    ROS:    Constiutional:Pt denies fever, chills, nausea, or vomiting.  MSK:as above        Objective      Past Medical History  Past Medical History:   Diagnosis Date   • Acid reflux    • Acid reflux    • Arthritis    • Atrial fibrillation (CMS/HCC)    • Cataract     BILATERAL   • Coronary artery disease    • Glaucoma    • Heart disease    • Heart murmur    • History of nuclear stress test 2015    DR. AMBROSE-\"IT WAS OK\"   • [place] (hard of hearing)    • Kidney stone    • Macular degeneration    • Melena 9/9/2016   • Sleep apnea     NO CPAP   • Wears glasses          Physical Exam  Pulse 72   Ht 167.6 cm (65.98\")   Wt 98.6 kg (217 lb 6 oz)   SpO2 98%   BMI 35.10 kg/m²     Body mass index is 35.1 kg/m².    Patient is well nourished and well developed.        Ortho Exam  Left hand  Skin: intact without any redness or warmth.  Still with some mild soft tissue swelling.  Tenderness: Subtle discomfort noted base of the fifth metacarpal.  Most of his pain is along the ring finger  No rotational abnormality ring finger  FDP, FDS grossly intact to the ring finger.  Motor grossly intact R/U/M/AIN/PIN with stiffness noted to all digits.  Sensory grossly intact LT: R/U/M  Brisk capillary " refill, 2+ radial pulse      Imaging/Labs/EMG Reviewed:  Ordered left hand plain films.  Imaging read by Dr. Ley.    Left Hand X-Ray     Indication: Pain     Views:  AP, Lateral, and Oblique      Comparison: Left hand 8/19/2020     Findings:  The fracture of the base of the fifth metacarpal is healing appropriately.  New bone is visible.  No bony lesion  Normal soft tissues  Patient continues to demonstrate significant degenerative arthritis throughout the hand.     Impression:   Degenerative arthritis left hand  Healing base of the fifth metacarpal fracture      Assessment:  1. Closed displaced fracture of base of fifth metacarpal bone of left hand with routine healing, subsequent encounter    2. Stiffness of finger joint of left hand        Plan:  1. Healing fifth metacarpal fracture.  Patient may discontinue use of Exos brace as it increases the pain and continue use of arthritis glove.  2. Encourage range of motion to all digits of the hand but specifically to the ring finger.  This was buddy taped to the middle finger so that it can help assist with range of motion.  I did offer patient referral to PT/OT but he politely declined stating he believes he can do the exercises on his own.  3. Continue with over-the-counter pain medication as needed.  4. Follow-up 4 weeks for repeat evaluation which will include pre-clinic imaging of the left hand.  Depending on how he is doing with regard to the stiffness he may still require referral to PT/OT.  5. Questions and concerns answered.      Mounika Benitez PA-C  09/18/20  08:57 [place]      Dragon disclaimer:  Much of this encounter note is an electronic transcription/translation of spoken language to printed text. The electronic translation of spoken language may permit erroneous, or at times, nonsensical words or phrases to be inadvertently transcribed; Although I have reviewed the note for such errors, some may still exist.

## 2020-10-07 ENCOUNTER — OFFICE VISIT (OUTPATIENT)
Dept: INTERNAL MEDICINE | Facility: CLINIC | Age: 80
End: 2020-10-07

## 2020-10-07 VITALS
RESPIRATION RATE: 16 BRPM | HEART RATE: 52 BPM | OXYGEN SATURATION: 97 % | WEIGHT: 217.4 LBS | SYSTOLIC BLOOD PRESSURE: 117 MMHG | HEIGHT: 66 IN | TEMPERATURE: 97.5 F | BODY MASS INDEX: 34.94 KG/M2 | DIASTOLIC BLOOD PRESSURE: 50 MMHG

## 2020-10-07 DIAGNOSIS — R73.03 PREDIABETES: ICD-10-CM

## 2020-10-07 DIAGNOSIS — Z00.00 ROUTINE GENERAL MEDICAL EXAMINATION AT A HEALTH CARE FACILITY: ICD-10-CM

## 2020-10-07 DIAGNOSIS — K04.7 TOOTH ABSCESS: Primary | ICD-10-CM

## 2020-10-07 DIAGNOSIS — E79.0 HYPERURICEMIA: ICD-10-CM

## 2020-10-07 PROCEDURE — G0439 PPPS, SUBSEQ VISIT: HCPCS | Performed by: INTERNAL MEDICINE

## 2020-10-07 RX ORDER — DOXYCYCLINE 100 MG/1
100 CAPSULE ORAL EVERY 12 HOURS SCHEDULED
Qty: 28 CAPSULE | Refills: 0 | Status: SHIPPED | OUTPATIENT
Start: 2020-10-07 | End: 2021-02-25

## 2020-10-07 RX ORDER — FUROSEMIDE 20 MG/1
20 TABLET ORAL 3 TIMES WEEKLY
Qty: 30 TABLET | Refills: 11 | Status: SHIPPED | OUTPATIENT
Start: 2020-10-07

## 2020-10-07 RX ORDER — FEBUXOSTAT 40 MG/1
40 TABLET, FILM COATED ORAL DAILY
Qty: 90 TABLET | Refills: 3 | Status: SHIPPED | OUTPATIENT
Start: 2020-10-07 | End: 2021-02-25

## 2020-10-07 NOTE — PROGRESS NOTES
QUICK REFERENCE INFORMATION:  The ABCs of the Annual Wellness Visit    Medicare Annual Wellness Visit    Subjective   History of Present Illness    Gabriel Conley Jr. is a 79 y.o. male who presents for an Annual Wellness Visit. In addition, we addressed the following health issues tooth infection.      Went back to original oral surgeon.     Chronic adal pain 8/10        PMH, PSH, SocHx, FamHx, Allergies, and Medications: Reviewed and updated.     Outpatient Medications Prior to Visit   Medication Sig Dispense Refill   • atorvastatin (LIPITOR) 20 MG tablet Take 1 tablet by mouth Daily. 90 tablet 3   • B-12, Methylcobalamin, 1000 MCG sublingual tablet Place 1 tablet under the tongue Daily. 90 tablet 1   • cholecalciferol (VITAMIN D3) 1000 units tablet Take 1,000 Units by mouth Daily.     • Coenzyme Q10 100 MG tablet Take 200 mg by mouth Daily.     • ELIQUIS 5 MG tablet tablet TAKE 1 TABLET BY MOUTH TWICE A  tablet 3   • KLOR-CON 20 MEQ CR tablet TAKE 1 TABLET BY MOUTH EVERY DAY 90 tablet 0   • melatonin 5 MG tablet tablet Take 5 mg by mouth Every Night.     • metoprolol tartrate (LOPRESSOR) 50 MG tablet TAKE 1 TABLET BY MOUTH TWICE A  tablet 3   • Misc Natural Products (OSTEO BI-FLEX ADV DOUBLE ST PO) Take 1 tablet by mouth 2 (Two) Times a Day.     • nitroglycerin (NITROSTAT) 0.4 MG SL tablet Place 0.4 mg under the tongue Every 5 (Five) Minutes As Needed.     • omeprazole (priLOSEC) 20 MG capsule TAKE 1 CAPSULE BY MOUTH EVERY DAY 90 capsule 3   • telmisartan-hydrochlorothiazide (MICARDIS HCT) 80-25 MG per tablet Take 1 tablet by mouth Daily. 90 tablet 3   • furosemide (LASIX) 20 MG tablet Take 1 tablet by mouth 3 (Three) Times a Week. (Patient taking differently: Take 20 mg by mouth 3 (Three) Times a Week. Patient states he has been taking 40 mg. 3 times per week.) 30 tablet 11   • ciprofloxacin (Cipro) 500 MG tablet Take 1 tablet by mouth 2 (Two) Times a Day. 20 tablet 0   • clindamycin (CLEOCIN) 150 MG  capsule Take 1 capsule by mouth 4 (Four) Times a Day. 40 capsule 0   • fenofibrate (TRICOR) 145 MG tablet Take 145 mg by mouth Daily.     • Nutritional Supplements (ENSURE HIGH PROTEIN PO) Take 8 oz by mouth Every Morning.       No facility-administered medications prior to visit.        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 up to date  Eye Exam. up to date  No exam data present  Exercise: 7 times/week.  Current exercise activities include: walking    Health Risk Assessment:  The patient has completed a Health Risk Assessment. This has been reviewed with them and has been scanned into the patient's chart.    Current Medical Providers:  Patient Care Team:  Frank Torrez MD as PCP - General (Internal Medicine)  Frank Torrez MD as PCP - Ifeanyi Mccollum MD as Consulting Physician (General Surgery)    The [facility] providers who are involved in the care of this patient are listed above. Additional providers and suppliers are listed below:  Dr Hussein Bang        Recent Hospitalizations:  No hospitalization(s) within the last year..    Recent Lab Results:  CMP:  Lab Results   Component Value Date    GLU 89 12/11/2019    BUN 17 12/11/2019    CREATININE 1.18 12/11/2019    EGFRIFNONA 60 (L) 12/11/2019    EGFRIFAFRI 72 12/11/2019    BCR 14.4  12/11/2019     12/11/2019    K 4.0 12/11/2019    CO2 30.5 (H) 12/11/2019    CALCIUM 9.6 12/11/2019    PROTENTOTREF 7.0 12/11/2019    ALBUMIN 4.30 12/11/2019    LABGLOBREF 2.7 12/11/2019    LABIL2 1.6 12/11/2019    BILITOT 0.4 12/11/2019    ALKPHOS 69 12/11/2019    AST 22 12/11/2019    ALT 21 12/11/2019       LIPID PANEL:  Lab Results   Component Value Date    CHLPL 175 12/11/2019    TRIG 358 (H) 12/11/2019    HDL 34 (L) 12/11/2019    VLDL 71.6 12/11/2019    LDL 69 12/11/2019       HbA1c:  Lab Results   Component Value Date    HGBA1C 6.10 (H) 12/11/2019       URINE MICROALBUMIN:  Lab Results   Component Value Date    MICROALBUR 7.2 12/11/2019       PSA:  Lab Results   Component Value Date    PSA 6.550 (H) 12/11/2019       Age-appropriate Screening Schedule:  Refer to the list below for future screening recommendations based on patient's age, sex and/or medical conditions. Orders for these recommended tests are listed in the plan section. The patient has been provided with a written plan.    Health Maintenance   Topic Date Due   • ZOSTER VACCINE (2 of 3) 02/13/2018   • HEMOGLOBIN A1C  06/11/2020   • DIABETIC EYE EXAM  11/20/2020   • LIPID PANEL  12/11/2020   • URINE MICROALBUMIN  12/11/2020   • COLONOSCOPY  03/13/2028   • TDAP/TD VACCINES (2 - Td) 03/07/2029   • INFLUENZA VACCINE  Completed       Depression Screen:   PHQ-2/PHQ-9 Depression Screening 10/7/2020   Little interest or pleasure in doing things 0   Feeling down, depressed, or hopeless 0   Total Score 0         Functional and Cognitive Screening:  Functional & Cognitive Status 10/7/2020   Do you have difficulty preparing food and eating? No   Do you have difficulty bathing yourself, getting dressed or grooming yourself? No   Do you have difficulty using the toilet? No   Do you have difficulty moving around from place to place? No   Do you have trouble with steps or getting out of a bed or a chair? Yes   Current Diet Limited Junk Food        Current Diet  Comment -   Dental Exam Up to date   Eye Exam Up to date   Exercise (times per week) 3 times per week   Current Exercise Activities Include Walking   Do you need help using the phone?  No   Are you deaf or do you have serious difficulty hearing?  Yes   Do you need help with transportation? No   Do you need help shopping? No   Do you need help preparing meals?  No   Do you need help with housework?  No   Do you need help with laundry? No   Do you need help taking your medications? Yes   Do you need help managing money? Yes   Do you ever drive or ride in a car without wearing a seat belt? No   Have you felt unusual stress, anger or loneliness in the last month? -   Who do you live with? -   If you need help, do you have trouble finding someone available to you? -   Have you been bothered in the last four weeks by sexual problems? -   Do you have difficulty concentrating, remembering or making decisions? -       Does the patient have evidence of cognitive impairment? No    Advanced Care Planning:  ACP discussion was held with the patient during this visit. Patient has an advance directive in EMR which is still valid.     Identification of Risk Factors:  Risk factors include: Advance Directive Discussion  Chronic Pain   Fall Risk  Inactivity/Sedentary.    Review of Systems   Constitutional: Positive for fatigue.   HENT: Positive for facial swelling.    Respiratory: Negative for cough and shortness of breath.    Genitourinary: Negative for testicular pain.   Psychiatric/Behavioral: Negative for sleep disturbance.   All other systems reviewed and are negative.      Compared to one year ago, the patient feels his physical health is worse.  Compared to one year ago, the patient feels his mental health is better.    Objective     Physical Exam  Constitutional:       Appearance: Normal appearance. He is obese.   HENT:      Head: Normocephalic and atraumatic.      Comments: ttp left maxillary area.     Right Ear: Tympanic  "membrane and ear canal normal.      Left Ear: Tympanic membrane and ear canal normal.      Nose: Nose normal. No congestion or rhinorrhea.      Mouth/Throat:      Mouth: Mucous membranes are moist.      Pharynx: Oropharynx is clear. No oropharyngeal exudate or posterior oropharyngeal erythema.   Eyes:      Extraocular Movements: Extraocular movements intact.      Pupils: Pupils are equal, round, and reactive to light.   Neck:      Musculoskeletal: Normal range of motion.   Cardiovascular:      Rate and Rhythm: Tachycardia present.      Pulses: Normal pulses.      Heart sounds: Normal heart sounds. No murmur. No gallop.    Pulmonary:      Effort: Pulmonary effort is normal.      Breath sounds: Normal breath sounds.   Abdominal:      General: Bowel sounds are normal.      Palpations: Abdomen is soft.   Musculoskeletal: Normal range of motion.         General: No swelling or tenderness.   Skin:     General: Skin is warm and dry.      Capillary Refill: Capillary refill takes less than 2 seconds.      Coloration: Skin is not jaundiced or pale.   Neurological:      General: No focal deficit present.      Mental Status: He is alert and oriented to person, place, and time.      Cranial Nerves: No cranial nerve deficit.      Sensory: No sensory deficit.   Psychiatric:         Mood and Affect: Mood normal.         Behavior: Behavior normal.         Judgment: Judgment normal.         Vitals:    10/07/20 0941   BP: 117/50   BP Location: Left arm   Patient Position: Sitting   Cuff Size: Large Adult   Pulse: 52   Resp: 16   Temp: 97.5 °F (36.4 °C)   TempSrc: Temporal   SpO2: 97%   Weight: 98.6 kg (217 lb 6.4 oz)   Height: 167.6 cm (66\")       Body mass index is 35.09 kg/m².  Discussed the patient's BMI with him. The BMI is in the acceptable range.    Assessment/Plan   Patient Self-Management and Personalized Health Advice  The patient has been provided with information about: diet, exercise, weight management and fall prevention " and preventive services including:   · Annual Wellness Visit (AWV).    Visit Diagnoses:    ICD-10-CM ICD-9-CM   1. Tooth abscess  K04.7 522.5   2. Routine general medical examination at a health care facility  Z00.00 V70.0   3. Hyperuricemia  E79.0 790.6   4. Prediabetes   R73.03 790.29       Orders Placed This Encounter   Procedures   • T4, Free   • TSH   • Comprehensive Metabolic Panel   • Vitamin B12   • Lipid Panel   • Uric Acid   • CBC & Differential     Order Specific Question:   Manual Differential     Answer:   No       Outpatient Encounter Medications as of 10/7/2020   Medication Sig Dispense Refill   • atorvastatin (LIPITOR) 20 MG tablet Take 1 tablet by mouth Daily. 90 tablet 3   • B-12, Methylcobalamin, 1000 MCG sublingual tablet Place 1 tablet under the tongue Daily. 90 tablet 1   • cholecalciferol (VITAMIN D3) 1000 units tablet Take 1,000 Units by mouth Daily.     • Coenzyme Q10 100 MG tablet Take 200 mg by mouth Daily.     • ELIQUIS 5 MG tablet tablet TAKE 1 TABLET BY MOUTH TWICE A  tablet 3   • furosemide (LASIX) 20 MG tablet Take 1 tablet by mouth 3 (Three) Times a Week. 30 tablet 11   • KLOR-CON 20 MEQ CR tablet TAKE 1 TABLET BY MOUTH EVERY DAY 90 tablet 0   • melatonin 5 MG tablet tablet Take 5 mg by mouth Every Night.     • metoprolol tartrate (LOPRESSOR) 50 MG tablet TAKE 1 TABLET BY MOUTH TWICE A  tablet 3   • Misc Natural Products (OSTEO BI-FLEX ADV DOUBLE ST PO) Take 1 tablet by mouth 2 (Two) Times a Day.     • nitroglycerin (NITROSTAT) 0.4 MG SL tablet Place 0.4 mg under the tongue Every 5 (Five) Minutes As Needed.     • omeprazole (priLOSEC) 20 MG capsule TAKE 1 CAPSULE BY MOUTH EVERY DAY 90 capsule 3   • telmisartan-hydrochlorothiazide (MICARDIS HCT) 80-25 MG per tablet Take 1 tablet by mouth Daily. 90 tablet 3   • [DISCONTINUED] furosemide (LASIX) 20 MG tablet Take 1 tablet by mouth 3 (Three) Times a Week. (Patient taking differently: Take 20 mg by mouth 3 (Three) Times a  Week. Patient states he has been taking 40 mg. 3 times per week.) 30 tablet 11   • doxycycline (MONODOX) 100 MG capsule Take 1 capsule by mouth Every 12 (Twelve) Hours. 28 capsule 0   • febuxostat (Uloric) 40 MG tablet Take 1 tablet by mouth Daily. 90 tablet 3   • pneumococcal conj. 13-valent (PREVNAR-13) vaccine Inject 0.5 mL into the appropriate muscle as directed by prescriber 1 (One) Time for 1 dose. 0.5 mL 0   • [DISCONTINUED] ciprofloxacin (Cipro) 500 MG tablet Take 1 tablet by mouth 2 (Two) Times a Day. 20 tablet 0   • [DISCONTINUED] clindamycin (CLEOCIN) 150 MG capsule Take 1 capsule by mouth 4 (Four) Times a Day. 40 capsule 0   • [DISCONTINUED] fenofibrate (TRICOR) 145 MG tablet Take 145 mg by mouth Daily.     • [DISCONTINUED] Nutritional Supplements (ENSURE HIGH PROTEIN PO) Take 8 oz by mouth Every Morning.       No facility-administered encounter medications on file as of 10/7/2020.        Reviewed use of high risk medication in the elderly: yes  Reviewed for potential of harmful drug interactions in the elderly: yes    Follow Up:  Return in about 8 weeks (around 12/2/2020).     An After Visit Summary and PPPS with all of these plans were given to the patient.             Gabriel was seen today for medicare wellness-subsequent and hand issues.    Diagnoses and all orders for this visit:    Tooth abscess    Routine general medical examination at a health care facility  -     T4, Free  -     TSH  -     Comprehensive Metabolic Panel  -     Vitamin B12  -     CBC & Differential  -     Lipid Panel  -     Uric Acid    Hyperuricemia  -     T4, Free  -     TSH  -     Comprehensive Metabolic Panel  -     Vitamin B12  -     CBC & Differential  -     Lipid Panel  -     Uric Acid    Prediabetes   -     T4, Free  -     TSH    Other orders  -     doxycycline (MONODOX) 100 MG capsule; Take 1 capsule by mouth Every 12 (Twelve) Hours.  -     febuxostat (Uloric) 40 MG tablet; Take 1 tablet by mouth Daily.  -     furosemide  (LASIX) 20 MG tablet; Take 1 tablet by mouth 3 (Three) Times a Week.  -     pneumococcal conj. 13-valent (PREVNAR-13) vaccine; Inject 0.5 mL into the appropriate muscle as directed by prescriber 1 (One) Time for 1 dose.

## 2020-10-12 RX ORDER — POTASSIUM CHLORIDE 1500 MG/1
TABLET, EXTENDED RELEASE ORAL
Qty: 90 TABLET | Refills: 0 | Status: SHIPPED | OUTPATIENT
Start: 2020-10-12 | End: 2021-04-06

## 2020-10-13 ENCOUNTER — OFFICE VISIT (OUTPATIENT)
Dept: ORTHOPEDIC SURGERY | Facility: CLINIC | Age: 80
End: 2020-10-13

## 2020-10-13 VITALS — HEIGHT: 66 IN | BODY MASS INDEX: 34.39 KG/M2 | OXYGEN SATURATION: 98 % | HEART RATE: 56 BPM | WEIGHT: 214 LBS

## 2020-10-13 DIAGNOSIS — M19.042 PRIMARY OSTEOARTHRITIS OF LEFT HAND: ICD-10-CM

## 2020-10-13 DIAGNOSIS — S62.317D CLOSED DISPLACED FRACTURE OF BASE OF FIFTH METACARPAL BONE OF LEFT HAND WITH ROUTINE HEALING, SUBSEQUENT ENCOUNTER: ICD-10-CM

## 2020-10-13 DIAGNOSIS — Z09 FRACTURE FOLLOW-UP: Primary | ICD-10-CM

## 2020-10-13 DIAGNOSIS — M25.642 STIFFNESS OF FINGER JOINT OF LEFT HAND: ICD-10-CM

## 2020-10-13 PROCEDURE — 99213 OFFICE O/P EST LOW 20 MIN: CPT | Performed by: PHYSICIAN ASSISTANT

## 2020-10-13 NOTE — PROGRESS NOTES
"    [facility] Note        Subjective     CC: Follow-up (4 wek f/u; Closed displaced fracture of base of fifth metacarpal bone of left hand)      BIRDIE Conley Jr. is a 79 y.o. male.  Patient returns for follow-up left hand.  States that along the base of the fifth metacarpal with fractures feeling good.  His main complaint is continued stiffness noted to the fingers which has improved with his gentle range of motion exercises.    At this time he endorses a pain scale maximum of 2/10.  No reported numbness or tingling into the extremity.    Overall, patient's symptoms are improved.    ROS:    Constiutional:Pt denies fever, chills, nausea, or vomiting.  MSK:as above        Objective      Past Medical History  Past Medical History:   Diagnosis Date   • Acid reflux    • Acid reflux    • Arthritis    • Atrial fibrillation (CMS/HCC)    • Cataract     BILATERAL   • Coronary artery disease    • Glaucoma    • Heart disease    • Heart murmur    • History of nuclear stress test 2015    DR. AMBROSE-\"IT WAS OK\"   • [place] (hard of hearing)    • Kidney stone    • Macular degeneration    • Melena 9/9/2016   • Sleep apnea     NO CPAP   • Wears glasses          Physical Exam  Pulse 56   Ht 167.6 cm (65.98\")   Wt 97.1 kg (214 lb)   SpO2 98%   BMI 34.56 kg/m²     Body mass index is 34.56 kg/m².    Patient is well nourished and well developed.        Ortho Exam  Left hand  Skin: intact without any redness, warmth or swelling.  Tenderness:  No palpable discomfort along the base of the fifth metacarpal.  His main issue is just the stiffness in the fingers.  No rotational abnormality ring finger  Motor: Grossly intact R/U/M/AIN/PIN with stiffness noted to all digits.  Sensory: Grossly intact LT: R/U/M  Vascular: Brisk capillary refill, 2+ radial pulse.      Imaging/Labs/EMG Reviewed:  Ordered left hand plain films.  Imaging read by Dr. Ley.    Left Hand X-Ray     Indication: Pain     Views:  AP, " Lateral, and Oblique      Comparison: Left hand 9/15/2020     Findings:  The fracture at the base of the fifth metacarpal appears healed radiographically.  No bony lesion  Normal soft tissues  Diffuse degenerative changes are seen throughout the IP joints of the left hand.     Impression:   Healed fracture base of the fifth metacarpal  Diffuse arthritis left hand      Assessment:  1. Fracture follow-up    2. Closed displaced fracture of base of fifth metacarpal bone of left hand with routine healing, subsequent encounter    3. Stiffness of finger joint of left hand    4. Primary osteoarthritis of left hand        Plan:  1. Fifth metacarpal fracture left hand base, stable and healed.  2. Stiffness noted in fingers with degenerative arthritis noted throughout hand.  Stiffness has improved since last visit. I did offer to send the patient to formal OT/PT but he politely declined stating he can do range of motion exercises on his own.  3. Patient to continue with over-the-counter pain medication as needed.  4. Follow-up as needed.  If he changes his mind regarding referral to OT/PT he can contact the clinic.  5. Questions and concerns answered.      Mounika Benitez PA-C  10/16/20  09:23 EDT      Dragon disclaimer:  Much of this encounter note is an electronic transcription/translation of spoken language to printed text. The electronic translation of spoken language may permit erroneous, or at times, nonsensical words or phrases to be inadvertently transcribed; Although I have reviewed the note for such errors, some may still exist.

## 2020-12-07 DIAGNOSIS — K21.9 GASTROESOPHAGEAL REFLUX DISEASE WITHOUT ESOPHAGITIS: ICD-10-CM

## 2020-12-07 DIAGNOSIS — K27.9 PUD (PEPTIC ULCER DISEASE): ICD-10-CM

## 2020-12-07 DIAGNOSIS — K44.9 HERNIA, HIATAL: ICD-10-CM

## 2020-12-07 RX ORDER — OMEPRAZOLE 20 MG/1
20 CAPSULE, DELAYED RELEASE ORAL DAILY
Qty: 90 CAPSULE | Refills: 3 | Status: SHIPPED | OUTPATIENT
Start: 2020-12-07 | End: 2021-12-03

## 2020-12-07 NOTE — TELEPHONE ENCOUNTER
Caller: CARMEN OTT    Relationship: Emergency Contact    Best call back number: [phone] needed:   Requested Prescriptions     Pending Prescriptions Disp Refills   • omeprazole (priLOSEC) 20 MG capsule 90 capsule 3     Sig: Take 1 capsule by mouth Daily.       When do you need the refill by: ASAP    What details did the patient provide when requesting the medication: COMPLETELY OUT    Does the patient have less than a 3 day supply:  [x] Yes  [] No    What is the patient's preferred pharmacy: [id number] - [place], [place] - [address]  - [phone]  - [phone] FX

## 2021-02-01 RX ORDER — METOPROLOL TARTRATE 50 MG/1
TABLET, FILM COATED ORAL
Qty: 180 TABLET | Refills: 3 | Status: SHIPPED | OUTPATIENT
Start: 2021-02-01 | End: 2021-11-18

## 2021-02-25 ENCOUNTER — OFFICE VISIT (OUTPATIENT)
Dept: INTERNAL MEDICINE | Facility: CLINIC | Age: 81
End: 2021-02-25

## 2021-02-25 ENCOUNTER — HOSPITAL ENCOUNTER (OUTPATIENT)
Dept: GENERAL RADIOLOGY | Facility: HOSPITAL | Age: 81
Discharge: HOME | End: 2021-02-25
Admitting: INTERNAL MEDICINE

## 2021-02-25 VITALS
OXYGEN SATURATION: 97 % | RESPIRATION RATE: 16 BRPM | HEIGHT: 66 IN | SYSTOLIC BLOOD PRESSURE: 120 MMHG | HEART RATE: 57 BPM | TEMPERATURE: 97.7 F | WEIGHT: 225 LBS | BODY MASS INDEX: 36.16 KG/M2 | DIASTOLIC BLOOD PRESSURE: 80 MMHG
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
## 2021-02-25 DIAGNOSIS — G89.29 CHRONIC PAIN OF RIGHT KNEE: ICD-10-CM
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
## 2021-02-25 DIAGNOSIS — M25.561 CHRONIC PAIN OF RIGHT KNEE: ICD-10-CM

## 2021-02-25 PROBLEM — D68.318 HEMORRHAGIC DISORDER DUE TO CIRCULATING ANTICOAGULANTS: Status: ACTIVE | Noted: 2017-08-08

## 2021-02-25 PROBLEM — Z79.02 ENCOUNTER FOR CURRENT LONG TERM USE OF ANTIPLATELET DRUG: Status: ACTIVE | Noted: 2017-07-06

## 2021-02-25 PROBLEM — Z79.01 LONG TERM CURRENT USE OF ANTICOAGULANT THERAPY: Status: ACTIVE | Noted: 2017-07-06

## 2021-02-25 PROBLEM — I48.0 PAROXYSMAL ATRIAL FIBRILLATION (HCC): Status: ACTIVE | Noted: 2017-07-06

## 2021-02-25 PROBLEM — R69 TAKING MULTIPLE MEDICATIONS FOR CHRONIC DISEASE: Status: ACTIVE | Noted: 2017-06-16

## 2021-02-25 PROCEDURE — 73560 X-RAY EXAM OF KNEE 1 OR 2: CPT

## 2021-02-25 PROCEDURE — 99214 OFFICE O/P EST MOD 30 MIN: CPT | Performed by: INTERNAL MEDICINE

## 2021-02-25 RX ORDER — ALLOPURINOL 100 MG/1
100 TABLET ORAL DAILY
Qty: 90 TABLET | Refills: 3 | Status: SHIPPED | OUTPATIENT
Start: 2021-02-25 | End: 2021-09-27 | Stop reason: ALTCHOICE

## 2021-02-25 NOTE — PROGRESS NOTES
Subjective     Patient ID: Gabriel Conley Jr. is a 80 y.o. male. Patient is here for management of multiple medical problems.     Chief Complaint   Patient presents with   • Hyperuricemia     History of Present Illness       Leg hit knee right on ice.   Arthitis.  Not able to tolerate uloric.      The following portions of the patient's history were reviewed and updated as appropriate: allergies, current medications, past family history, past medical history, past social history, past surgical history and problem list.    Review of Systems   Musculoskeletal: Positive for arthralgias. Negative for myalgias and neck pain.   Psychiatric/Behavioral: Negative for sleep disturbance.       Current Outpatient Medications:   •  atorvastatin (LIPITOR) 20 MG tablet, Take 1 tablet by mouth Daily., Disp: 90 tablet, Rfl: 3  •  B-12, Methylcobalamin, 1000 MCG sublingual tablet, Place 1 tablet under the tongue Daily., Disp: 90 tablet, Rfl: 1  •  cholecalciferol (VITAMIN D3) 1000 units tablet, Take 1,000 Units by mouth Daily., Disp: , Rfl:   •  Coenzyme Q10 100 MG tablet, Take 200 mg by mouth Daily., Disp: , Rfl:   •  ELIQUIS 5 MG tablet tablet, TAKE 1 TABLET BY MOUTH TWICE A DAY, Disp: 180 tablet, Rfl: 3  •  furosemide (LASIX) 20 MG tablet, Take 1 tablet by mouth 3 (Three) Times a Week., Disp: 30 tablet, Rfl: 11  •  KLOR-CON 20 MEQ CR tablet, TAKE 1 TABLET BY MOUTH EVERY DAY, Disp: 90 tablet, Rfl: 0  •  melatonin 5 MG tablet tablet, Take 5 mg by mouth Every Night., Disp: , Rfl:   •  metoprolol tartrate (LOPRESSOR) 50 MG tablet, TAKE 1 TABLET BY MOUTH TWICE A DAY, Disp: 180 tablet, Rfl: 3  •  Misc Natural Products (OSTEO BI-FLEX ADV DOUBLE ST PO), Take 1 tablet by mouth 2 (Two) Times a Day., Disp: , Rfl:   •  nitroglycerin (NITROSTAT) 0.4 MG SL tablet, Place 0.4 mg under the tongue Every 5 (Five) Minutes As Needed., Disp: , Rfl:   •  omeprazole (priLOSEC) 20 MG capsule, Take 1 capsule by mouth Daily., Disp: 90 capsule, Rfl: 3  •   "telmisartan-hydrochlorothiazide (MICARDIS HCT) 80-25 MG per tablet, Take 1 tablet by mouth Daily., Disp: 90 tablet, Rfl: 3  •  allopurinol (Zyloprim) 100 MG tablet, Take 1 tablet by mouth Daily., Disp: 90 tablet, Rfl: 3    Objective      Blood pressure 120/80, pulse 57, temperature 97.7 °F (36.5 °C), resp. rate 16, height 167.6 cm (65.98\"), weight 102 kg (225 lb), SpO2 97 %.    Physical Exam     General Appearance:    Alert, cooperative, no distress, appears stated age   Head:    Normocephalic, without obvious abnormality, atraumatic   Eyes:    PERRL, conjunctiva/corneas clear, EOM's intact   Ears:    Normal TM's and external ear canals, both ears   Nose:   Nares normal, septum midline, mucosa normal, no drainage   or sinus tenderness   Throat:   Lips, mucosa, and tongue normal; teeth and gums normal   Neck:   Supple, symmetrical, trachea midline, no adenopathy;        thyroid:  No enlargement/tenderness/nodules; no carotid    bruit or JVD   Back:     Symmetric, no curvature, ROM normal, no CVA tenderness   Lungs:     Clear to auscultation bilaterally, respirations unlabored   Chest wall:    No tenderness or deformity   Heart:    Regular rate and rhythm, S1 and S2 normal, no murmur,        rub or gallop   Abdomen:     Soft, non-tender, bowel sounds active all four quadrants,     no masses, no organomegaly   Extremities:  ttp right lateral knee.  ttp left lateral hand.     Extremities normal, atraumatic, no cyanosis or edema   Pulses:   2+ and symmetric all extremities   Skin:   Skin color, texture, turgor normal, no rashes or lesions   Lymph nodes:   Cervical, supraclavicular, and axillary nodes normal   Neurologic:   CNII-XII intact. Normal strength, sensation and reflexes       throughout      Results for orders placed or performed in visit on 12/11/19   TSH    Specimen: Blood   Result Value Ref Range    TSH 3.120 0.270 - 4.200 uIU/mL   T4, Free    Specimen: Blood   Result Value Ref Range    Free T4 1.26 0.93 - " 1.70 ng/dL   Comprehensive Metabolic Panel    Specimen: Blood   Result Value Ref Range    Glucose 89 65 - 99 mg/dL    BUN 17 8 - 23 mg/dL    Creatinine 1.18 0.76 - 1.27 mg/dL    eGFR Non African Am 60 (L) >60 mL/min/1.73    eGFR African Am 72 >60 mL/min/1.73    BUN/Creatinine Ratio 14.4 7.0 - 25.0    Sodium 141 136 - 145 mmol/L    Potassium 4.0 3.5 - 5.2 mmol/L    Chloride 100 98 - 107 mmol/L    Total CO2 30.5 (H) 22.0 - 29.0 mmol/L    Calcium 9.6 8.6 - 10.5 mg/dL    Total Protein 7.0 6.0 - 8.5 g/dL    Albumin 4.30 3.50 - 5.20 g/dL    Globulin 2.7 gm/dL    A/G Ratio 1.6 g/dL    Total Bilirubin 0.4 0.2 - 1.2 mg/dL    Alkaline Phosphatase 69 39 - 117 U/L    AST (SGOT) 22 1 - 40 U/L    ALT (SGPT) 21 1 - 41 U/L   Vitamin B12    Specimen: Blood   Result Value Ref Range    Vitamin B-12 1,193 (H) 211 - 946 pg/mL   Lipid Panel    Specimen: Blood   Result Value Ref Range    Total Cholesterol 175 0 - 200 mg/dL    Triglycerides 358 (H) 0 - 150 mg/dL    HDL Cholesterol 34 (L) 40 - 60 mg/dL    VLDL Cholesterol 71.6 mg/dL    LDL Cholesterol  69 0 - 100 mg/dL   PSA Screen    Specimen: Blood   Result Value Ref Range    PSA 6.550 (H) 0.000 - 4.000 ng/mL   [place] Spotted Fever, IgM    Specimen: Blood   Result Value Ref Range    RMSF IgM 2.24 (H) 0.00 - 0.89 index   [facility] Spotted Fever, IgG    Specimen: Blood   Result Value Ref Range    RMSF IgG Negative Negative   Lyme Disease, PCR    Specimen: Lumbar Puncture; Cerebrospinal Fluid   Result Value Ref Range    Lyme Disease(B.burgdorferi)PCR Negative Negative   Ehrlichia Antibody Panel    Specimen: Blood   Result Value Ref Range    E. chaffeensis ([facility]ME) IgG Titer Negative Neg:<1:64    E. chaffeensis ([facility]ME) IgM Titer Negative Neg:<1:20    HGE IgG Titer Negative Neg:<1:64    HGE IgM Titer Negative Neg:<1:20   Cyclic Citrul Peptide Antibody, IgG / IgA    Specimen: Blood   Result Value Ref Range    CCP Antibodies IgG/IgA 48 (H) 0 - 19 units   Rheumatoid Factor    Specimen: Blood    Result Value Ref Range    RA Latex Turbid <10.0 0.0 - 13.9 IU/mL   Sedimentation Rate    Specimen: Blood   Result Value Ref Range    Sed Rate 14 0 - 20 mm/hr   Uric Acid    Specimen: Blood   Result Value Ref Range    Uric Acid 7.6 (H) 3.4 - 7.0 mg/dL   C-reactive Protein    Specimen: Blood   Result Value Ref Range    C-Reactive Protein 0.31 0.00 - 0.50 mg/dL   Antistreptolysin O Titer    Specimen: Blood   Result Value Ref Range    ASO <20.0 0.0 - 200.0 IU/mL   Hemoglobin A1c    Specimen: Blood   Result Value Ref Range    Hemoglobin A1C 6.10 (H) 4.80 - 5.60 %   MicroAlbumin, Urine, Random - Urine, Clean Catch    Specimen: Urine, Clean Catch   Result Value Ref Range    Microalbumin, Urine 7.2 Not Estab. ug/mL   CBC & Differential    Specimen: Blood   Result Value Ref Range    WBC 8.42 3.40 - 10.80 10*3/mm3    RBC 4.83 4.14 - 5.80 10*6/mm3    Hemoglobin 15.3 13.0 - 17.7 g/dL    Hematocrit 43.3 37.5 - 51.0 %    MCV 89.6 79.0 - 97.0 fL    MCH 31.7 26.6 - 33.0 pg    MCHC 35.3 31.5 - 35.7 g/dL    RDW 13.5 12.3 - 15.4 %    Platelets 150 140 - 450 10*3/mm3    Neutrophil Rel % 55.9 42.7 - 76.0 %    Lymphocyte Rel % 32.3 19.6 - 45.3 %    Monocyte Rel % 8.2 5.0 - 12.0 %    Eosinophil Rel % 2.7 0.3 - 6.2 %    Basophil Rel % 0.5 0.0 - 1.5 %    Neutrophils Absolute 4.71 1.70 - 7.00 10*3/mm3    Lymphocytes Absolute 2.72 0.70 - 3.10 10*3/mm3    Monocytes Absolute 0.69 0.10 - 0.90 10*3/mm3    Eosinophils Absolute 0.23 0.00 - 0.40 10*3/mm3    Basophils Absolute 0.04 0.00 - 0.20 10*3/mm3    Immature Granulocyte Rel % 0.4 0.0 - 0.5 %    Immature Grans Absolute 0.03 0.00 - 0.05 10*3/mm3    nRBC 0.0 0.0 - 0.2 /100 WBC         Assessment/Plan   hyperuricemia      Diagnoses and all orders for this visit:    1. Fall, initial encounter (Primary)  -     XR Knee 1 or 2 View Right; Future    2. Chronic pain of right knee    Other orders  -     allopurinol (Zyloprim) 100 MG tablet; Take 1 tablet by mouth Daily.  Dispense: 90 tablet; Refill:  3      Return in about 3 months (around 5/25/2021).          There are no Patient Instructions on file for this visit.     Frank Torrez MD    Assessment/Plan

## 2021-03-16 ENCOUNTER — TELEPHONE (OUTPATIENT)
Dept: SURGERY | Facility: CLINIC | Age: 81
End: 2021-03-16

## 2021-04-06 RX ORDER — POTASSIUM CHLORIDE 1500 MG/1
TABLET, EXTENDED RELEASE ORAL
Qty: 90 TABLET | Refills: 3 | Status: SHIPPED | OUTPATIENT
Start: 2021-04-06 | End: 2022-03-23

## 2021-05-04 ENCOUNTER — APPOINTMENT (OUTPATIENT)
Dept: CT IMAGING | Facility: HOSPITAL | Age: 81
End: 2021-05-04

## 2021-05-04 ENCOUNTER — HOSPITAL ENCOUNTER (EMERGENCY)
Facility: HOSPITAL | Age: 81
Discharge: HOME | End: 2021-05-04
Attending: EMERGENCY MEDICINE | Admitting: EMERGENCY MEDICINE

## 2021-05-04 VITALS
RESPIRATION RATE: 14 BRPM | HEART RATE: 78 BPM | SYSTOLIC BLOOD PRESSURE: 136 MMHG | OXYGEN SATURATION: 96 % | TEMPERATURE: 98.1 F | DIASTOLIC BLOOD PRESSURE: 82 MMHG | WEIGHT: 235 LBS | HEIGHT: 67 IN | BODY MASS INDEX: 36.88 KG/M2

## 2021-05-04 DIAGNOSIS — Z79.01 ANTICOAGULATED: ICD-10-CM

## 2021-05-04 DIAGNOSIS — R31.0 GROSS HEMATURIA: Primary | ICD-10-CM

## 2021-05-04 DIAGNOSIS — N30.91 HEMORRHAGIC CYSTITIS: ICD-10-CM

## 2021-05-04 LAB
ALBUMIN SERPL-MCNC: 3.8 G/DL (ref 3.5–5.2)
ALBUMIN/GLOB SERPL: 1.1 G/DL
ALP SERPL-CCNC: 55 U/L (ref 39–117)
ALT SERPL W P-5'-P-CCNC: 18 U/L (ref 1–41)
ANION GAP SERPL CALCULATED.3IONS-SCNC: 11 MMOL/L (ref 5–15)
AST SERPL-CCNC: 31 U/L (ref 1–40)
BACTERIA UR QL AUTO: ABNORMAL /HPF
BASOPHILS # BLD AUTO: 0.05 10*3/MM3 (ref 0–0.2)
BASOPHILS NFR BLD AUTO: 0.5 % (ref 0–1.5)
BILIRUB SERPL-MCNC: 0.4 MG/DL (ref 0–1.2)
BILIRUB UR QL STRIP: ABNORMAL
BUN SERPL-MCNC: 28 MG/DL (ref 8–23)
BUN/CREAT SERPL: 19.4 (ref 7–25)
CALCIUM SPEC-SCNC: 9.6 MG/DL (ref 8.6–10.5)
CHLORIDE SERPL-SCNC: 103 MMOL/L (ref 98–107)
CLARITY UR: ABNORMAL
CO2 SERPL-SCNC: 26 MMOL/L (ref 22–29)
COLOR UR: ABNORMAL
CREAT SERPL-MCNC: 1.44 MG/DL (ref 0.76–1.27)
DEPRECATED RDW RBC AUTO: 48.1 FL (ref 37–54)
DEVELOPER EXPIRATION DATE: NORMAL
DEVELOPER LOT NUMBER: NORMAL
EOSINOPHIL # BLD AUTO: 0.32 10*3/MM3 (ref 0–0.4)
EOSINOPHIL NFR BLD AUTO: 3.5 % (ref 0.3–6.2)
ERYTHROCYTE [DISTWIDTH] IN BLOOD BY AUTOMATED COUNT: 13.9 % (ref 12.3–15.4)
EXPIRATION DATE: NORMAL
FECAL OCCULT BLOOD SCREEN, POC: NEGATIVE
GFR SERPL CREATININE-BSD FRML MDRD: 47 ML/MIN/1.73
GLOBULIN UR ELPH-MCNC: 3.4 GM/DL
GLUCOSE SERPL-MCNC: 90 MG/DL (ref 65–99)
GLUCOSE UR STRIP-MCNC: NEGATIVE MG/DL
HCT VFR BLD AUTO: 44.6 % (ref 37.5–51)
HGB BLD-MCNC: 14.7 G/DL (ref 13–17.7)
HGB UR QL STRIP.AUTO: ABNORMAL
HYALINE CASTS UR QL AUTO: ABNORMAL /LPF
IMM GRANULOCYTES # BLD AUTO: 0.03 10*3/MM3 (ref 0–0.05)
IMM GRANULOCYTES NFR BLD AUTO: 0.3 % (ref 0–0.5)
KETONES UR QL STRIP: NEGATIVE
LEUKOCYTE ESTERASE UR QL STRIP.AUTO: ABNORMAL
LYMPHOCYTES # BLD AUTO: 2.92 10*3/MM3 (ref 0.7–3.1)
LYMPHOCYTES NFR BLD AUTO: 31.6 % (ref 19.6–45.3)
Lab: NORMAL
MCH RBC QN AUTO: 31.1 PG (ref 26.6–33)
MCHC RBC AUTO-ENTMCNC: 33 G/DL (ref 31.5–35.7)
MCV RBC AUTO: 94.5 FL (ref 79–97)
MONOCYTES # BLD AUTO: 0.8 10*3/MM3 (ref 0.1–0.9)
MONOCYTES NFR BLD AUTO: 8.6 % (ref 5–12)
NEGATIVE CONTROL: NEGATIVE
NEUTROPHILS NFR BLD AUTO: 5.13 10*3/MM3 (ref 1.7–7)
NEUTROPHILS NFR BLD AUTO: 55.5 % (ref 42.7–76)
NITRITE UR QL STRIP: POSITIVE
NRBC BLD AUTO-RTO: 0 /100 WBC (ref 0–0.2)
PH UR STRIP.AUTO: <=5 [PH] (ref 5–8)
PLATELET # BLD AUTO: 150 10*3/MM3 (ref 140–450)
PMV BLD AUTO: 9.9 FL (ref 6–12)
POSITIVE CONTROL: POSITIVE
POTASSIUM SERPL-SCNC: 4.5 MMOL/L (ref 3.5–5.2)
PROT SERPL-MCNC: 7.2 G/DL (ref 6–8.5)
PROT UR QL STRIP: ABNORMAL
RBC # BLD AUTO: 4.72 10*6/MM3 (ref 4.14–5.8)
RBC # UR: ABNORMAL /HPF
REF LAB TEST METHOD: ABNORMAL
SODIUM SERPL-SCNC: 140 MMOL/L (ref 136–145)
SP GR UR STRIP: 1.02 (ref 1–1.03)
SQUAMOUS #/AREA URNS HPF: ABNORMAL /HPF
UROBILINOGEN UR QL STRIP: ABNORMAL
WBC # BLD AUTO: 9.25 10*3/MM3 (ref 3.4–10.8)
WBC UR QL AUTO: ABNORMAL /HPF

## 2021-05-04 PROCEDURE — 81001 URINALYSIS AUTO W/SCOPE: CPT

## 2021-05-04 PROCEDURE — 87086 URINE CULTURE/COLONY COUNT: CPT | Performed by: EMERGENCY MEDICINE

## 2021-05-04 PROCEDURE — 96365 THER/PROPH/DIAG IV INF INIT: CPT

## 2021-05-04 PROCEDURE — 99284 EMERGENCY DEPT VISIT MOD MDM: CPT

## 2021-05-04 PROCEDURE — 25010000002 CEFTRIAXONE PER 250 MG: Performed by: EMERGENCY MEDICINE

## 2021-05-04 PROCEDURE — 51798 US URINE CAPACITY MEASURE: CPT

## 2021-05-04 PROCEDURE — 80053 COMPREHEN METABOLIC PANEL: CPT | Performed by: EMERGENCY MEDICINE

## 2021-05-04 PROCEDURE — 85025 COMPLETE CBC W/AUTO DIFF WBC: CPT | Performed by: EMERGENCY MEDICINE

## 2021-05-04 PROCEDURE — 74176 CT ABD & PELVIS W/O CONTRAST: CPT

## 2021-05-04 PROCEDURE — 82270 OCCULT BLOOD FECES: CPT | Performed by: EMERGENCY MEDICINE

## 2021-05-04 RX ORDER — SODIUM CHLORIDE 0.9 % (FLUSH) 0.9 %
10 SYRINGE (ML) INJECTION AS NEEDED
Status: DISCONTINUED | OUTPATIENT
Start: 2021-05-04 | End: 2021-05-04 | Stop reason: HOSPADM

## 2021-05-04 RX ORDER — CEFDINIR 300 MG/1
300 CAPSULE ORAL 2 TIMES DAILY
Qty: 14 CAPSULE | Refills: 0 | Status: SHIPPED | OUTPATIENT
Start: 2021-05-04 | End: 2021-09-27

## 2021-05-04 RX ADMIN — SODIUM CHLORIDE 1 G: 900 INJECTION INTRAVENOUS at 18:59

## 2021-05-06 LAB — BACTERIA SPEC AEROBE CULT: NO GROWTH

## 2021-06-02 RX ORDER — TELMISARTAN AND HYDROCHLORTHIAZIDE 80; 25 MG/1; MG/1
TABLET ORAL
Qty: 90 TABLET | Refills: 1 | Status: SHIPPED | OUTPATIENT
Start: 2021-06-02 | End: 2021-11-30

## 2021-07-06 DIAGNOSIS — I48.21 PERMANENT ATRIAL FIBRILLATION (HCC): Primary | ICD-10-CM

## 2021-07-07 RX ORDER — APIXABAN 5 MG/1
TABLET, FILM COATED ORAL
Qty: 180 TABLET | Refills: 0 | Status: SHIPPED | OUTPATIENT
Start: 2021-07-07 | End: 2021-10-04

## 2021-07-30 RX ORDER — ATORVASTATIN CALCIUM 20 MG/1
TABLET, FILM COATED ORAL
Qty: 90 TABLET | Refills: 3 | Status: SHIPPED | OUTPATIENT
Start: 2021-07-30 | End: 2022-08-05

## 2021-09-23 NOTE — PROGRESS NOTES
[facility] Cardiology Office Follow Up Note    Gabriel Conley Jr.  [id number]  2021    Primary Care Provider: Frank Torrez MD   Referring Provider: No ref. provider found    Chief Complaint: Regular follow-up    History of Present Illness:   Mr. Gabriel Conley Jr. is a 80 y.o. male who presents to the Cardiology Clinic for regular follow-up.  Mr. Conley has a past medical history which is significant for hypertension, dyslipidemia, chronic kidney disease, and GERD his past medical history is also significant for peripheral vascular disease, with prior left subclavian artery stenting in .  He has a past cardiac history which is significant for suspected persistent atrial fibrillation and multivessel coronary artery disease reportedly diagnosed on coronary angiography in  in [place].  At that time, he subsequently underwent a four-vessel CABG.  On review of records, he has previously followed by cardiology and it appears his last MPS was in  with no evidence of ischemia at that time.  He presents today for regular follow-up.  The patient reports feeling well from a cardiac standpoint.  He specifically denies chest pain, dyspnea.  He denies palpitations, dizziness, syncope.  He denies, apnea, PND, lower extremity edema.  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  They have recently moved in with their children and are happy with this change.  The patient denies any significant bruising or bleeding with Eliquis.  He offers no other complaints or concerns at this time.      Past Cardiac Testin. Last Coronary Angio:     1. In [place], revealed multivessel disease  2. Prior Stress Testin. MPS : Reportedly unremarkable with normal perfusion, LVEF 65%  3. Last Echo: 2019   1.  Normal LV systolic function, LVEF 55-60%   2.  Normal left ventricular diastolic filling pattern.   3.  Normal right  ventricular size and systolic function.   4.  Mild left atrial dilation.   5.  Trace mitral regurgitation.   6.  Trace tricuspid regurgitation.  4. Prior Holter Monitor: None    Review of Systems:   Review of Systems   Constitutional: Negative for activity change, chills, diaphoresis, fatigue, fever and unexpected weight gain.   Eyes: Negative for blurred vision and visual disturbance.   Respiratory: Negative for apnea, cough, chest tightness, shortness of breath and wheezing.    Cardiovascular: Negative for chest pain, palpitations and leg swelling.   Gastrointestinal: Negative for abdominal distention, blood in stool, GERD and indigestion.   Endocrine: Negative for cold intolerance and heat intolerance.   Genitourinary: Negative for hematuria.   Musculoskeletal: Negative for gait problem, joint swelling and myalgias.   Skin: Negative for color change, pallor and bruise.   Neurological: Negative for dizziness, seizures, syncope, weakness, light-headedness, numbness, headache and confusion.   Hematological: Does not bruise/bleed easily.   Psychiatric/Behavioral: Negative for behavioral problems, sleep disturbance, suicidal ideas and depressed mood.     I have reviewed and confirmed the accuracy of the ROS as documented by the MA/LPN/RN MARY St    I have reviewed and/or updated the patient's past medical, past surgical, family, social history, problem list and allergies as appropriate.     Medications:     Current Outpatient Medications:   •  atorvastatin (LIPITOR) 20 MG tablet, TAKE 1 TABLET BY MOUTH EVERY DAY, Disp: 90 tablet, Rfl: 3  •  B-12, Methylcobalamin, 1000 MCG sublingual tablet, Place 1 tablet under the tongue Daily., Disp: 90 tablet, Rfl: 1  •  cholecalciferol (VITAMIN D3) 1000 units tablet, Take 1,000 Units by mouth Daily., Disp: , Rfl:   •  Coenzyme Q10 100 MG tablet, Take 200 mg by mouth Daily., Disp: , Rfl:   •  Eliquis 5 MG tablet tablet, TAKE 1 TABLET BY MOUTH TWICE A DAY, Disp:  "180 tablet, Rfl: 0  •  febuxostat (ULORIC) 40 MG tablet, Take 40 mg by mouth Daily., Disp: , Rfl:   •  furosemide (LASIX) 20 MG tablet, Take 1 tablet by mouth 3 (Three) Times a Week., Disp: 30 tablet, Rfl: 11  •  KLOR-CON 20 MEQ CR tablet, TAKE 1 TABLET BY MOUTH EVERY DAY, Disp: 90 tablet, Rfl: 3  •  melatonin 5 MG tablet tablet, Take 5 mg by mouth Every Night., Disp: , Rfl:   •  metoprolol tartrate (LOPRESSOR) 50 MG tablet, TAKE 1 TABLET BY MOUTH TWICE A DAY, Disp: 180 tablet, Rfl: 3  •  Misc Natural Products (OSTEO BI-FLEX ADV DOUBLE ST PO), Take 1 tablet by mouth 2 (Two) Times a Day., Disp: , Rfl:   •  nitroglycerin (NITROSTAT) 0.4 MG SL tablet, Place 0.4 mg under the tongue Every 5 (Five) Minutes As Needed., Disp: , Rfl:   •  omeprazole (priLOSEC) 20 MG capsule, Take 1 capsule by mouth Daily., Disp: 90 capsule, Rfl: 3  •  telmisartan-hydrochlorothiazide (MICARDIS HCT) 80-25 MG per tablet, TAKE 1 TABLET BY MOUTH EVERY DAY, Disp: 90 tablet, Rfl: 1  •  aspirin (aspirin) 81 MG EC tablet, Take 1 tablet by mouth Daily., Disp: 90 tablet, Rfl: 3    Physical Exam:  Vital Signs:   Vitals:    09/27/21 1416   BP: 138/84   BP Location: Left arm   Patient Position: Sitting   Cuff Size: Adult   Pulse: 75   Resp: 22   SpO2: 96%   Weight: 102 kg (224 lb)   Height: 168.9 cm (66.5\")     Body mass index is 35.62 kg/m².    Physical Exam  Vitals and nursing note reviewed.   Constitutional:       General: He is not in acute distress.     Appearance: Normal appearance. He is well-developed.      Comments: BMI 35.6 kg/m²   HENT:      Head: Normocephalic and atraumatic.   Eyes:      General: No scleral icterus.     Extraocular Movements: Extraocular movements intact.   Neck:      Trachea: Trachea normal.   Cardiovascular:      Rate and Rhythm: Normal rate and regular rhythm.      Pulses: Normal pulses.      Heart sounds: Normal heart sounds. No murmur heard.   No friction rub. No gallop.    Pulmonary:      Effort: Pulmonary effort is " normal.      Breath sounds: Normal breath sounds. No wheezing, rhonchi or rales.   Abdominal:      Palpations: Abdomen is soft.      Tenderness: There is no abdominal tenderness.   Musculoskeletal:         General: Normal range of motion.      Cervical back: Neck supple.      Right lower leg: No edema.      Left lower leg: No edema.   Skin:     General: Skin is warm and dry.      Findings: No bruising, lesion or rash.   Neurological:      Mental Status: He is alert and oriented to person, place, and time.      Motor: No weakness.      Gait: Gait normal.   Psychiatric:         Mood and Affect: Mood normal.         Behavior: Behavior normal. Behavior is cooperative.         Thought Content: Thought content does not include suicidal ideation.         Results Review:   I reviewed the patient's new clinical results.      ECG 12 Lead    Date/Time: 9/27/2021 2:44 PM  Performed by: Lali Neves APRN  Authorized by: Lali Neves APRN   Rhythm: sinus rhythm  Rate: normal  BPM: 61  QRS axis: right  Other findings: low voltage and T wave abnormality    Clinical impression: abnormal EKG          Assessment / Plan:     1.  Multivessel coronary artery disease  --Status post four-vessel CABG in 2003  --Maintains an active lifestyle without angina or exertional symptoms  --Continue high intensity statin  --Restart daily low-dose aspirin  --Follow-up with Dr. Savage in 1 year or sooner if needed     2.  Permanent atrial fibrillation  --EKG shows sinus rhythm  --Continue metoprolol for rate control  --Continue Eliquis for embolic prophylaxis     3.  Essential hypertension  --Acceptable blood pressure     4.  Dyslipidemia  --Continue high intensity statin       Preventative Cardiology:   Tobacco Cessation: N/A   Advance Care Planning: ACP discussion was declined by the patient. Patient does not have an advance directive, declines further assistance.     Follow Up:   Return in about 1 year (around 9/27/2022) for  Follow-up with Dr. Savage.      Thank you for allowing me to participate in the care of your patient. Please to not hesitate to contact me with additional questions or concerns.     MARY Larry

## 2021-09-26 PROBLEM — I25.118 CORONARY ARTERY DISEASE WITH STABLE ANGINA PECTORIS: Status: ACTIVE | Noted: 2021-09-26

## 2021-09-27 ENCOUNTER — TELEPHONE (OUTPATIENT)
Dept: CARDIOLOGY | Facility: CLINIC | Age: 81
End: 2021-09-27

## 2021-09-27 ENCOUNTER — OFFICE VISIT (OUTPATIENT)
Dept: CARDIOLOGY | Facility: CLINIC | Age: 81
End: 2021-09-27

## 2021-09-27 VITALS
WEIGHT: 224 LBS | RESPIRATION RATE: 22 BRPM | HEIGHT: 66 IN | BODY MASS INDEX: 36 KG/M2 | DIASTOLIC BLOOD PRESSURE: 84 MMHG | OXYGEN SATURATION: 96 % | SYSTOLIC BLOOD PRESSURE: 138 MMHG | HEART RATE: 75 BPM

## 2021-09-27 DIAGNOSIS — E78.5 DYSLIPIDEMIA: ICD-10-CM

## 2021-09-27 DIAGNOSIS — I25.10 TRIPLE VESSEL CORONARY ARTERY DISEASE: Primary | ICD-10-CM

## 2021-09-27 DIAGNOSIS — I48.0 PAROXYSMAL ATRIAL FIBRILLATION (HCC): ICD-10-CM

## 2021-09-27 DIAGNOSIS — I25.118 CORONARY ARTERY DISEASE OF NATIVE ARTERY OF NATIVE HEART WITH STABLE ANGINA PECTORIS (HCC): ICD-10-CM

## 2021-09-27 DIAGNOSIS — I50.22 CHRONIC SYSTOLIC HEART FAILURE (HCC): ICD-10-CM

## 2021-09-27 DIAGNOSIS — I10 ESSENTIAL HYPERTENSION: ICD-10-CM

## 2021-09-27 DIAGNOSIS — I77.1 STENOSIS OF SUBCLAVIAN ARTERY (HCC): ICD-10-CM

## 2021-09-27 PROCEDURE — 99213 OFFICE O/P EST LOW 20 MIN: CPT | Performed by: NURSE PRACTITIONER

## 2021-09-27 PROCEDURE — 93000 ELECTROCARDIOGRAM COMPLETE: CPT | Performed by: NURSE PRACTITIONER

## 2021-09-27 RX ORDER — FEBUXOSTAT 40 MG/1
40 TABLET, FILM COATED ORAL DAILY
COMMUNITY
Start: 2021-07-08 | End: 2021-10-04

## 2021-09-27 RX ORDER — ASPIRIN 81 MG/1
81 TABLET ORAL DAILY
Qty: 90 TABLET | Refills: 3 | Status: SHIPPED | OUTPATIENT
Start: 2021-09-27

## 2021-09-27 NOTE — TELEPHONE ENCOUNTER
Chidi Fofana, will you please let this patient know that I was looking through his chart and noticed he should be on a low-dose baby aspirin daily?  This is okay to take with his Eliquis.  This was something Dr. Savage had made a note to mention to him but he ended up following up with [facility] instead and somehow it got lost.  I left a message on his cell phone already letting him know that this has been called into the pharmacy on file.  But would you make sure he understands this?  Thanks so much.

## 2021-10-03 DIAGNOSIS — I48.21 PERMANENT ATRIAL FIBRILLATION (HCC): ICD-10-CM

## 2021-10-04 RX ORDER — APIXABAN 5 MG/1
TABLET, FILM COATED ORAL
Qty: 180 TABLET | Refills: 3 | Status: SHIPPED | OUTPATIENT
Start: 2021-10-04 | End: 2022-10-13

## 2021-10-04 RX ORDER — FEBUXOSTAT 40 MG/1
TABLET, FILM COATED ORAL
Qty: 90 TABLET | Refills: 3 | Status: SHIPPED | OUTPATIENT
Start: 2021-10-04 | End: 2022-11-21

## 2021-11-18 RX ORDER — METOPROLOL TARTRATE 50 MG/1
TABLET, FILM COATED ORAL
Qty: 180 TABLET | Refills: 0 | Status: SHIPPED | OUTPATIENT
Start: 2021-11-18 | End: 2022-02-09

## 2021-11-30 DIAGNOSIS — I10 ESSENTIAL HYPERTENSION: Primary | ICD-10-CM

## 2021-11-30 RX ORDER — TELMISARTAN AND HYDROCHLORTHIAZIDE 80; 25 MG/1; MG/1
TABLET ORAL
Qty: 90 TABLET | Refills: 2 | Status: SHIPPED | OUTPATIENT
Start: 2021-11-30 | End: 2022-08-23

## 2021-12-03 DIAGNOSIS — K21.9 GASTROESOPHAGEAL REFLUX DISEASE WITHOUT ESOPHAGITIS: ICD-10-CM

## 2021-12-03 DIAGNOSIS — K44.9 HERNIA, HIATAL: ICD-10-CM

## 2021-12-03 DIAGNOSIS — K27.9 PUD (PEPTIC ULCER DISEASE): ICD-10-CM

## 2021-12-03 RX ORDER — OMEPRAZOLE 20 MG/1
CAPSULE, DELAYED RELEASE ORAL
Qty: 90 CAPSULE | Refills: 3 | Status: SHIPPED | OUTPATIENT
Start: 2021-12-03 | End: 2022-11-28

## 2021-12-03 NOTE — TELEPHONE ENCOUNTER
Rx Refill Note  Requested Prescriptions     Pending Prescriptions Disp Refills   • omeprazole (priLOSEC) 20 MG capsule [Pharmacy Med Name: OMEPRAZOLE DR 20 MG CAPSULE] 90 capsule 3     Sig: TAKE 1 CAPSULE BY MOUTH EVERY DAY      Last office visit with prescribing clinician: 2/25/2021      Next office visit with prescribing clinician: Visit date not found            YOUSIF CASTREJON MA  12/03/21, 09:24 EST

## 2022-02-09 DIAGNOSIS — I10 PRIMARY HYPERTENSION: Primary | ICD-10-CM

## 2022-02-09 RX ORDER — METOPROLOL TARTRATE 50 MG/1
TABLET, FILM COATED ORAL
Qty: 180 TABLET | Refills: 0 | Status: SHIPPED | OUTPATIENT
Start: 2022-02-09 | End: 2022-05-12

## 2022-02-09 NOTE — TELEPHONE ENCOUNTER
Rx Refill Note  Requested Prescriptions     Pending Prescriptions Disp Refills   • metoprolol tartrate (LOPRESSOR) 50 MG tablet [Pharmacy Med Name: METOPROLOL TARTRATE 50 MG TAB] 180 tablet 0     Sig: TAKE 1 TABLET BY MOUTH TWICE A DAY      Last office visit with prescribing clinician: 2/25/2021      Next office visit with prescribing clinician: Visit date not found            YOUSIF CASTREJON MA  02/09/22, 08:29 EST

## 2022-03-23 RX ORDER — POTASSIUM CHLORIDE 1500 MG/1
TABLET, EXTENDED RELEASE ORAL
Qty: 90 TABLET | Refills: 3 | Status: SHIPPED | OUTPATIENT
Start: 2022-03-23 | End: 2023-03-20

## 2022-05-05 ENCOUNTER — OFFICE VISIT (OUTPATIENT)
Dept: INTERNAL MEDICINE | Facility: CLINIC | Age: 82
End: 2022-05-05

## 2022-05-05 VITALS
BODY MASS INDEX: 36.16 KG/M2 | HEART RATE: 65 BPM | SYSTOLIC BLOOD PRESSURE: 122 MMHG | WEIGHT: 225 LBS | TEMPERATURE: 97.8 F | RESPIRATION RATE: 16 BRPM | DIASTOLIC BLOOD PRESSURE: 72 MMHG | OXYGEN SATURATION: 98 % | HEIGHT: 66 IN

## 2022-05-05 DIAGNOSIS — Z12.11 COLON CANCER SCREENING: ICD-10-CM

## 2022-05-05 DIAGNOSIS — M19.90 ARTHRITIS: ICD-10-CM

## 2022-05-05 DIAGNOSIS — Z00.00 ROUTINE GENERAL MEDICAL EXAMINATION AT A HEALTH CARE FACILITY: ICD-10-CM

## 2022-05-05 DIAGNOSIS — Z12.5 PROSTATE CANCER SCREENING: ICD-10-CM

## 2022-05-05 DIAGNOSIS — E79.0 HYPERURICEMIA: Primary | ICD-10-CM

## 2022-05-05 DIAGNOSIS — I10 PRIMARY HYPERTENSION: ICD-10-CM

## 2022-05-05 PROCEDURE — 1159F MED LIST DOCD IN RCRD: CPT | Performed by: INTERNAL MEDICINE

## 2022-05-05 PROCEDURE — 1125F AMNT PAIN NOTED PAIN PRSNT: CPT | Performed by: INTERNAL MEDICINE

## 2022-05-05 PROCEDURE — 1170F FXNL STATUS ASSESSED: CPT | Performed by: INTERNAL MEDICINE

## 2022-05-05 PROCEDURE — 99214 OFFICE O/P EST MOD 30 MIN: CPT | Performed by: INTERNAL MEDICINE

## 2022-05-05 PROCEDURE — 99397 PER PM REEVAL EST PAT 65+ YR: CPT | Performed by: INTERNAL MEDICINE

## 2022-05-05 PROCEDURE — G0439 PPPS, SUBSEQ VISIT: HCPCS | Performed by: INTERNAL MEDICINE

## 2022-05-05 NOTE — PROGRESS NOTES
Subjective     Patient ID: Gabriel Conley Jr. is a 81 y.o. male. Patient is here for management of multiple medical problems.     Chief Complaint   Patient presents with   • Hand Pain     bilateral     History of Present Illness   Arthritis.  Hands since dec.  Wife stated longer.    + rmsf in the past.     Elevated uric acid in the past.    The following portions of the patient's history were reviewed and updated as appropriate: allergies, current medications, past family history, past medical history, past social history, past surgical history and problem list.    Review of Systems    Current Outpatient Medications:   •  aspirin (aspirin) 81 MG EC tablet, Take 1 tablet by mouth Daily., Disp: 90 tablet, Rfl: 3  •  atorvastatin (LIPITOR) 20 MG tablet, TAKE 1 TABLET BY MOUTH EVERY DAY, Disp: 90 tablet, Rfl: 3  •  cholecalciferol (VITAMIN D3) 1000 units tablet, Take 1,000 Units by mouth Daily., Disp: , Rfl:   •  Coenzyme Q10 100 MG tablet, Take 200 mg by mouth Daily., Disp: , Rfl:   •  Eliquis 5 MG tablet tablet, TAKE 1 TABLET BY MOUTH TWICE A DAY, Disp: 180 tablet, Rfl: 3  •  febuxostat (ULORIC) 40 MG tablet, TAKE 1 TABLET BY MOUTH EVERY DAY, Disp: 90 tablet, Rfl: 3  •  furosemide (LASIX) 20 MG tablet, Take 1 tablet by mouth 3 (Three) Times a Week., Disp: 30 tablet, Rfl: 11  •  KLOR-CON 20 MEQ CR tablet, TAKE 1 TABLET BY MOUTH EVERY DAY, Disp: 90 tablet, Rfl: 3  •  melatonin 5 MG tablet tablet, Take 5 mg by mouth Every Night., Disp: , Rfl:   •  metoprolol tartrate (LOPRESSOR) 50 MG tablet, TAKE 1 TABLET BY MOUTH TWICE A DAY, Disp: 180 tablet, Rfl: 0  •  Misc Natural Products (OSTEO BI-FLEX ADV DOUBLE ST PO), Take 1 tablet by mouth 2 (Two) Times a Day., Disp: , Rfl:   •  nitroglycerin (NITROSTAT) 0.4 MG SL tablet, Place 0.4 mg under the tongue Every 5 (Five) Minutes As Needed., Disp: , Rfl:   •  omeprazole (priLOSEC) 20 MG capsule, TAKE 1 CAPSULE BY MOUTH EVERY DAY, Disp: 90 capsule, Rfl: 3  •   "telmisartan-hydrochlorothiazide (MICARDIS HCT) 80-25 MG per tablet, TAKE 1 TABLET BY MOUTH EVERY DAY, Disp: 90 tablet, Rfl: 2    Objective      Blood pressure 122/72, pulse 65, temperature 97.8 °F (36.6 °C), resp. rate 16, height 166.4 cm (65.5\"), weight 102 kg (225 lb), SpO2 98 %.    Physical Exam     General Appearance:    Alert, cooperative, no distress, appears stated age   Head:    Normocephalic, without obvious abnormality, atraumatic   Eyes:    PERRL, conjunctiva/corneas clear, EOM's intact   Ears:    Normal TM's and external ear canals, both ears   Nose:   Nares normal, septum midline, mucosa normal, no drainage   or sinus tenderness   Throat:   Lips, mucosa, and tongue normal; teeth and gums normal   Neck:   Supple, symmetrical, trachea midline, no adenopathy;        thyroid:  No enlargement/tenderness/nodules; no carotid    bruit or JVD   Back:     Symmetric, no curvature, ROM normal, no CVA tenderness   Lungs:     Clear to auscultation bilaterally, respirations unlabored   Chest wall:    No tenderness or deformity   Heart:    Regular rate and rhythm, S1 and S2 normal, no murmur,        rub or gallop   Abdomen:     Soft, non-tender, bowel sounds active all four quadrants,     no masses, no organomegaly   Extremities:   Extremities normal, atraumatic, no cyanosis or edema   Pulses:   2+ and symmetric all extremities   Skin:   Skin color, texture, turgor normal, no rashes or lesions   Lymph nodes:   Cervical, supraclavicular, and axillary nodes normal   Neurologic:   CNII-XII intact. Normal strength, sensation and reflexes       throughout      Results for orders placed or performed during the hospital encounter of 05/04/21   Urine Culture - Urine, Urine, Clean Catch    Specimen: Urine, Clean Catch   Result Value Ref Range    Urine Culture No growth    Urinalysis With Microscopic If Indicated (No Culture) - Urine, Clean Catch    Specimen: Urine, Clean Catch   Result Value Ref Range    Color, UA Red (A) " Yellow, Straw    Appearance, UA Turbid (A) Clear    pH, UA <=5.0 5.0 - 8.0    Specific Gravity, UA 1.021 1.001 - 1.030    Glucose, UA Negative Negative    Ketones, UA Negative Negative    Bilirubin, UA Small (1+) (A) Negative    Blood, UA Large (3+) (A) Negative    Protein,  mg/dL (2+) (A) Negative    Leuk Esterase, UA Moderate (2+) (A) Negative    Nitrite, UA Positive (A) Negative    Urobilinogen, UA 0.2 E.U./dL 0.2 - 1.0 E.U./dL   Urinalysis, Microscopic Only - Urine, Clean Catch    Specimen: Urine, Clean Catch   Result Value Ref Range    RBC, UA Too Numerous to Count (A) None Seen, 0-2 /HPF    WBC, UA 21-30 (A) None Seen, 0-2 /HPF    Bacteria, UA 1+ (A) None Seen, Trace /HPF    Squamous Epithelial Cells, UA 7-12 (A) None Seen, 0-2 /HPF    Hyaline Casts, UA None Seen 0 - 6 /LPF    Methodology Manual Light Microscopy    Comprehensive Metabolic Panel    Specimen: Blood   Result Value Ref Range    Glucose 90 65 - 99 mg/dL    BUN 28 (H) 8 - 23 mg/dL    Creatinine 1.44 (H) 0.76 - 1.27 mg/dL    Sodium 140 136 - 145 mmol/L    Potassium 4.5 3.5 - 5.2 mmol/L    Chloride 103 98 - 107 mmol/L    CO2 26.0 22.0 - 29.0 mmol/L    Calcium 9.6 8.6 - 10.5 mg/dL    Total Protein 7.2 6.0 - 8.5 g/dL    Albumin 3.80 3.50 - 5.20 g/dL    ALT (SGPT) 18 1 - 41 U/L    AST (SGOT) 31 1 - 40 U/L    Alkaline Phosphatase 55 39 - 117 U/L    Total Bilirubin 0.4 0.0 - 1.2 mg/dL    eGFR Non African Amer 47 (L) >60 mL/min/1.73    Globulin 3.4 gm/dL    A/G Ratio 1.1 g/dL    BUN/Creatinine Ratio 19.4 7.0 - 25.0    Anion Gap 11.0 5.0 - 15.0 mmol/L   CBC Auto Differential    Specimen: Blood   Result Value Ref Range    WBC 9.25 3.40 - 10.80 10*3/mm3    RBC 4.72 4.14 - 5.80 10*6/mm3    Hemoglobin 14.7 13.0 - 17.7 g/dL    Hematocrit 44.6 37.5 - 51.0 %    MCV 94.5 79.0 - 97.0 fL    MCH 31.1 26.6 - 33.0 pg    MCHC 33.0 31.5 - 35.7 g/dL    RDW 13.9 12.3 - 15.4 %    RDW-SD 48.1 37.0 - 54.0 fl    MPV 9.9 6.0 - 12.0 fL    Platelets 150 140 - 450 10*3/mm3     Neutrophil % 55.5 42.7 - 76.0 %    Lymphocyte % 31.6 19.6 - 45.3 %    Monocyte % 8.6 5.0 - 12.0 %    Eosinophil % 3.5 0.3 - 6.2 %    Basophil % 0.5 0.0 - 1.5 %    Immature Grans % 0.3 0.0 - 0.5 %    Neutrophils, Absolute 5.13 1.70 - 7.00 10*3/mm3    Lymphocytes, Absolute 2.92 0.70 - 3.10 10*3/mm3    Monocytes, Absolute 0.80 0.10 - 0.90 10*3/mm3    Eosinophils, Absolute 0.32 0.00 - 0.40 10*3/mm3    Basophils, Absolute 0.05 0.00 - 0.20 10*3/mm3    Immature Grans, Absolute 0.03 0.00 - 0.05 10*3/mm3    nRBC 0.0 0.0 - 0.2 /100 WBC   POC Occult Blood Stool    Specimen: Per Rectum; Stool   Result Value Ref Range    Fecal Occult Blood Negative Negative    Lot Number [id number]     Expiration Date 10-23     DEVELOPER LOT NUMBER [id number]     DEVELOPER EXPIRATION DATE 2,024/6     Positive Control Positive Positive    Negative Control Negative Negative         Assessment/Plan       Diagnoses and all orders for this visit:    1. Hyperuricemia (Primary)  -     [place] Spotted Fever, IgM  -     Uric acid  -     Comprehensive Metabolic Panel  -     Vitamin B12  -     CBC & Differential  -     Lipid Panel  -     PSA Screen  -     TSH  -     T4, Free  -     Hemoglobin A1c  -     MicroAlbumin, Urine, Random - Urine, Clean Catch    2. Prostate cancer screening  -     [place] Spotted Fever, IgM  -     Uric acid  -     Comprehensive Metabolic Panel  -     Vitamin B12  -     CBC & Differential  -     Lipid Panel  -     PSA Screen  -     TSH  -     T4, Free  -     Hemoglobin A1c  -     MicroAlbumin, Urine, Random - Urine, Clean Catch    3. Primary hypertension  -     [place] Spotted Fever, IgM  -     Uric acid  -     Comprehensive Metabolic Panel  -     Vitamin B12  -     CBC & Differential  -     Lipid Panel  -     PSA Screen  -     TSH  -     T4, Free  -     Hemoglobin A1c  -     MicroAlbumin, Urine, Random - Urine, Clean Catch    4. Routine general medical examination at a health care facility  -     [place]  Spotted Fever, IgM  -     Uric acid  -     Comprehensive Metabolic Panel  -     Vitamin B12  -     CBC & Differential  -     Lipid Panel  -     PSA Screen  -     TSH  -     T4, Free  -     Hemoglobin A1c  -     MicroAlbumin, Urine, Random - Urine, Clean Catch    5. Arthritis  -     [place] Spotted Fever, IgM  -     Uric acid  -     Comprehensive Metabolic Panel  -     Vitamin B12  -     CBC & Differential  -     Lipid Panel  -     PSA Screen  -     TSH  -     T4, Free  -     Hemoglobin A1c  -     MicroAlbumin, Urine, Random - Urine, Clean Catch      Return in about 6 weeks (around 6/16/2022).          There are no Patient Instructions on file for this visit.     Frank Torrez MD    Assessment/Plan

## 2022-05-05 NOTE — PROGRESS NOTES
QUICK REFERENCE INFORMATION:  The ABCs of the Annual Wellness Visit    Medicare Annual Wellness Visit    Subjective   History of Present Illness    Gabriel Conley Jr. is a 81 y.o. male who presents for an Annual Wellness Visit. In addition, we addressed the following health issues:    Chronic pain 8/10        PMH, PSH, SocHx, FamHx, Allergies, and Medications: Reviewed and updated.     Outpatient Medications Prior to Visit   Medication Sig Dispense Refill   • aspirin (aspirin) 81 MG EC tablet Take 1 tablet by mouth Daily. 90 tablet 3   • atorvastatin (LIPITOR) 20 MG tablet TAKE 1 TABLET BY MOUTH EVERY DAY 90 tablet 3   • cholecalciferol (VITAMIN D3) 1000 units tablet Take 1,000 Units by mouth Daily.     • Coenzyme Q10 100 MG tablet Take 200 mg by mouth Daily.     • Eliquis 5 MG tablet tablet TAKE 1 TABLET BY MOUTH TWICE A  tablet 3   • febuxostat (ULORIC) 40 MG tablet TAKE 1 TABLET BY MOUTH EVERY DAY 90 tablet 3   • furosemide (LASIX) 20 MG tablet Take 1 tablet by mouth 3 (Three) Times a Week. 30 tablet 11   • KLOR-CON 20 MEQ CR tablet TAKE 1 TABLET BY MOUTH EVERY DAY 90 tablet 3   • melatonin 5 MG tablet tablet Take 5 mg by mouth Every Night.     • metoprolol tartrate (LOPRESSOR) 50 MG tablet TAKE 1 TABLET BY MOUTH TWICE A  tablet 0   • Misc Natural Products (OSTEO BI-FLEX ADV DOUBLE ST PO) Take 1 tablet by mouth 2 (Two) Times a Day.     • nitroglycerin (NITROSTAT) 0.4 MG SL tablet Place 0.4 mg under the tongue Every 5 (Five) Minutes As Needed.     • omeprazole (priLOSEC) 20 MG capsule TAKE 1 CAPSULE BY MOUTH EVERY DAY 90 capsule 3   • telmisartan-hydrochlorothiazide (MICARDIS HCT) 80-25 MG per tablet TAKE 1 TABLET BY MOUTH EVERY DAY 90 tablet 2   • amoxicillin-clavulanate (AUGMENTIN) 875-125 MG per tablet Take 1 tablet by mouth 2 (Two) Times a Day. 20 tablet 0   • predniSONE (DELTASONE) 20 MG tablet Take 1 tablet by mouth 2 (Two) Times a Day. 10 tablet 0     No facility-administered medications prior  to visit.       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)   • Obesity   • Diabetes mellitus (HCC)   • Nocturnal hypoxia   • Chronic back pain   • Chronic kidney disease   • Heart failure (HCC)   • 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 up to date  Eye Exam. up to date  No exam data present  Exercise: 3 times/week.  Current exercise activities include: walking    Health Risk Assessment:  The patient has completed a Health Risk Assessment. This has been reviewed with them and has been scanned into the patient's chart.    Current Medical Providers:  Patient Care Team:  Frank Torrez MD as PCP - General (Internal Medicine)  Ifeanyi Conley MD as Consulting Physician (General Surgery)    The [facility] providers who are involved in the care of this patient are listed above. Additional providers and suppliers are listed  below:      Recent Hospitalizations:  No recent hospitalization(s)..    Recent Lab Results:  CMP:  Lab Results   Component Value Date    BUN 28 (H) 05/04/2021    CREATININE 1.44 (H) 05/04/2021    EGFRIFNONA 47 (L) 05/04/2021    EGFRIFAFRI 72 12/11/2019    BCR 19.4 05/04/2021     05/04/2021    K 4.5 05/04/2021    CO2 26.0 05/04/2021    CALCIUM 9.6 05/04/2021    PROTENTOTREF 7.0 12/11/2019    ALBUMIN 3.80 05/04/2021    LABGLOBREF 2.7 12/11/2019    LABIL2 1.6 12/11/2019    BILITOT 0.4 05/04/2021    ALKPHOS 55 05/04/2021    AST 31 05/04/2021    ALT 18 05/04/2021       LIPID PANEL:  Lab Results   Component Value Date    CHLPL 175 12/11/2019    TRIG 358 (H) 12/11/2019    HDL 34 (L) 12/11/2019    VLDL 71.6 12/11/2019    LDL 69 12/11/2019       HbA1c:  Lab Results   Component Value Date    HGBA1C 6.10 (H) 12/11/2019       URINE MICROALBUMIN:  Lab Results   Component Value Date    MICROALBUR 7.2 12/11/2019       PSA:  Lab Results   Component Value Date    PSA 6.550 (H) 12/11/2019       Age-appropriate Screening Schedule:  Refer to the list below for future screening recommendations based on patient's age, sex and/or medical conditions. Orders for these recommended tests are listed in the plan section. The patient has been provided with a written plan.    Health Maintenance   Topic Date Due   • HEMOGLOBIN A1C  06/11/2020   • DIABETIC EYE EXAM  11/20/2020   • LIPID PANEL  12/11/2020   • URINE MICROALBUMIN  12/11/2020   • INFLUENZA VACCINE  08/01/2022   • TDAP/TD VACCINES (2 - Td or Tdap) 03/07/2029   • ZOSTER VACCINE  Discontinued       Depression Screen:   PHQ-2/PHQ-9 Depression Screening 5/5/2022   Retired Total Score -   Little Interest or Pleasure in Doing Things 0-->not at all   Feeling Down, Depressed or Hopeless 0-->not at all   PHQ-9: Brief Depression Severity Measure Score 0         Functional and Cognitive Screening:  Functional & Cognitive Status 10/7/2020   Do you have difficulty preparing food and eating? No  "  Do you have difficulty bathing yourself, getting dressed or grooming yourself? No   Do you have difficulty using the toilet? No   Do you have difficulty moving around from place to place? No   Do you have trouble with steps or getting out of a bed or a chair? Yes   Current Diet Limited Junk Food        Current Diet Comment -   Dental Exam Up to date   Eye Exam Up to date   Exercise (times per week) 3 times per week   Current Exercise Activities Include Walking   Do you need help using the phone?  No   Are you deaf or do you have serious difficulty hearing?  Yes   Do you need help with transportation? No   Do you need help shopping? No   Do you need help preparing meals?  No   Do you need help with housework?  No   Do you need help with laundry? No   Do you need help taking your medications? Yes   Do you need help managing money? Yes   Do you ever drive or ride in a car without wearing a seat belt? No   Have you felt unusual stress, anger or loneliness in the last month? -   Who do you live with? -   If you need help, do you have trouble finding someone available to you? -   Have you been bothered in the last four weeks by sexual problems? -   Do you have difficulty concentrating, remembering or making decisions? -       Does the patient have evidence of cognitive impairment? No    Advanced Care Planning:  ACP discussion was held with the patient during this visit. Patient does not have an advance directive, declines further assistance.    Identification of Risk Factors:  Risk factors include: Advance Directive Discussion  Fall Risk.    Review of Systems    Compared to one year ago, the patient feels his physical health is worse.  Compared to one year ago, the patient feels his mental health is the same.    Objective     Physical Exam    Vitals:    05/05/22 1434   BP: 122/72   Pulse: 65   Resp: 16   Temp: 97.8 °F (36.6 °C)   SpO2: 98%   Weight: 102 kg (225 lb)   Height: 166.4 cm (65.5\")   PainSc:   6   PainLoc: " Hand       Body mass index is 36.87 kg/m².  Discussed the patient's BMI with him. The BMI is in the acceptable range.    Assessment/Plan   Patient Self-Management and Personalized Health Advice  The patient has been provided with information about: diet, exercise and weight management and preventive services including:   · Annual Wellness Visit (AWV).    Visit Diagnoses:    ICD-10-CM ICD-9-CM   1. Hyperuricemia  E79.0 790.6   2. Prostate cancer screening  Z12.5 V76.44   3. Primary hypertension  I10 401.9   4. Routine general medical examination at a health care facility  Z00.00 V70.0   5. Arthritis  M19.90 716.90       Orders Placed This Encounter   Procedures   • [place] Spotted Fever, IgM     Order Specific Question:   Release to patient     Answer:   Immediate   • Uric acid     Order Specific Question:   Release to patient     Answer:   Immediate   • Comprehensive Metabolic Panel     Order Specific Question:   Release to patient     Answer:   Immediate   • Vitamin B12     Order Specific Question:   Release to patient     Answer:   Immediate   • Lipid Panel   • PSA Screen     Order Specific Question:   Release to patient     Answer:   Immediate   • TSH     Order Specific Question:   Release to patient     Answer:   Immediate   • T4, Free     Order Specific Question:   Release to patient     Answer:   Immediate   • Hemoglobin A1c     Order Specific Question:   Release to patient     Answer:   Immediate   • MicroAlbumin, Urine, Random - Urine, Clean Catch     Order Specific Question:   Release to patient     Answer:   Immediate   • CBC & Differential     Order Specific Question:   Manual Differential     Answer:   No       Outpatient Encounter Medications as of 5/5/2022   Medication Sig Dispense Refill   • aspirin (aspirin) 81 MG EC tablet Take 1 tablet by mouth Daily. 90 tablet 3   • atorvastatin (LIPITOR) 20 MG tablet TAKE 1 TABLET BY MOUTH EVERY DAY 90 tablet 3   • cholecalciferol (VITAMIN D3) 1000 units  tablet Take 1,000 Units by mouth Daily.     • Coenzyme Q10 100 MG tablet Take 200 mg by mouth Daily.     • Eliquis 5 MG tablet tablet TAKE 1 TABLET BY MOUTH TWICE A  tablet 3   • febuxostat (ULORIC) 40 MG tablet TAKE 1 TABLET BY MOUTH EVERY DAY 90 tablet 3   • furosemide (LASIX) 20 MG tablet Take 1 tablet by mouth 3 (Three) Times a Week. 30 tablet 11   • KLOR-CON 20 MEQ CR tablet TAKE 1 TABLET BY MOUTH EVERY DAY 90 tablet 3   • melatonin 5 MG tablet tablet Take 5 mg by mouth Every Night.     • metoprolol tartrate (LOPRESSOR) 50 MG tablet TAKE 1 TABLET BY MOUTH TWICE A  tablet 0   • Misc Natural Products (OSTEO BI-FLEX ADV DOUBLE ST PO) Take 1 tablet by mouth 2 (Two) Times a Day.     • nitroglycerin (NITROSTAT) 0.4 MG SL tablet Place 0.4 mg under the tongue Every 5 (Five) Minutes As Needed.     • omeprazole (priLOSEC) 20 MG capsule TAKE 1 CAPSULE BY MOUTH EVERY DAY 90 capsule 3   • telmisartan-hydrochlorothiazide (MICARDIS HCT) 80-25 MG per tablet TAKE 1 TABLET BY MOUTH EVERY DAY 90 tablet 2   • [DISCONTINUED] amoxicillin-clavulanate (AUGMENTIN) 875-125 MG per tablet Take 1 tablet by mouth 2 (Two) Times a Day. 20 tablet 0   • [DISCONTINUED] predniSONE (DELTASONE) 20 MG tablet Take 1 tablet by mouth 2 (Two) Times a Day. 10 tablet 0     No facility-administered encounter medications on file as of 5/5/2022.       Reviewed use of high risk medication in the elderly: yes  Reviewed for potential of harmful drug interactions in the elderly: yes    Follow Up:  Return in about 6 weeks (around 6/16/2022).     An After Visit Summary and PPPS with all of these plans were given to the patient.             Diagnoses and all orders for this visit:    1. Hyperuricemia (Primary)  -     [place] Spotted Fever, IgM  -     Uric acid  -     Comprehensive Metabolic Panel  -     Vitamin B12  -     CBC & Differential  -     Lipid Panel  -     PSA Screen  -     TSH  -     T4, Free  -     Hemoglobin A1c  -      MicroAlbumin, Urine, Random - Urine, Clean Catch    2. Prostate cancer screening  -     [place] Spotted Fever, IgM  -     Uric acid  -     Comprehensive Metabolic Panel  -     Vitamin B12  -     CBC & Differential  -     Lipid Panel  -     PSA Screen  -     TSH  -     T4, Free  -     Hemoglobin A1c  -     MicroAlbumin, Urine, Random - Urine, Clean Catch    3. Primary hypertension  -     [place] Spotted Fever, IgM  -     Uric acid  -     Comprehensive Metabolic Panel  -     Vitamin B12  -     CBC & Differential  -     Lipid Panel  -     PSA Screen  -     TSH  -     T4, Free  -     Hemoglobin A1c  -     MicroAlbumin, Urine, Random - Urine, Clean Catch    4. Routine general medical examination at a health care facility  -     [place] Spotted Fever, IgM  -     Uric acid  -     Comprehensive Metabolic Panel  -     Vitamin B12  -     CBC & Differential  -     Lipid Panel  -     PSA Screen  -     TSH  -     T4, Free  -     Hemoglobin A1c  -     MicroAlbumin, Urine, Random - Urine, Clean Catch    5. Arthritis  -     [place] Spotted Fever, IgM  -     Uric acid  -     Comprehensive Metabolic Panel  -     Vitamin B12  -     CBC & Differential  -     Lipid Panel  -     PSA Screen  -     TSH  -     T4, Free  -     Hemoglobin A1c  -     MicroAlbumin, Urine, Random - Urine, Clean Catch

## 2022-05-12 DIAGNOSIS — I10 PRIMARY HYPERTENSION: ICD-10-CM

## 2022-05-12 RX ORDER — METOPROLOL TARTRATE 50 MG/1
TABLET, FILM COATED ORAL
Qty: 180 TABLET | Refills: 0 | Status: SHIPPED | OUTPATIENT
Start: 2022-05-12 | End: 2022-08-08

## 2022-06-07 LAB
ALBUMIN SERPL-MCNC: 3.9 G/DL (ref 3.5–5.2)
ALBUMIN/GLOB SERPL: 1.4 G/DL
ALP SERPL-CCNC: 59 U/L (ref 39–117)
ALT SERPL-CCNC: 15 U/L (ref 1–41)
AST SERPL-CCNC: 15 U/L (ref 1–40)
BASOPHILS # BLD AUTO: 0.04 10*3/MM3 (ref 0–0.2)
BASOPHILS NFR BLD AUTO: 0.5 % (ref 0–1.5)
BILIRUB SERPL-MCNC: 0.4 MG/DL (ref 0–1.2)
BUN SERPL-MCNC: 21 MG/DL (ref 8–23)
BUN/CREAT SERPL: 17.8 (ref 7–25)
CALCIUM SERPL-MCNC: 9.7 MG/DL (ref 8.6–10.5)
CHLORIDE SERPL-SCNC: 105 MMOL/L (ref 98–107)
CHOLEST SERPL-MCNC: 177 MG/DL (ref 0–200)
CO2 SERPL-SCNC: 24.3 MMOL/L (ref 22–29)
CREAT SERPL-MCNC: 1.18 MG/DL (ref 0.76–1.27)
EGFRCR SERPLBLD CKD-EPI 2021: 62.4 ML/MIN/1.73
EOSINOPHIL # BLD AUTO: 0.33 10*3/MM3 (ref 0–0.4)
EOSINOPHIL NFR BLD AUTO: 4.4 % (ref 0.3–6.2)
ERYTHROCYTE [DISTWIDTH] IN BLOOD BY AUTOMATED COUNT: 13.8 % (ref 12.3–15.4)
GLOBULIN SER CALC-MCNC: 2.8 GM/DL
GLUCOSE SERPL-MCNC: 90 MG/DL (ref 65–99)
HBA1C MFR BLD: 5.8 % (ref 4.8–5.6)
HCT VFR BLD AUTO: 44.8 % (ref 37.5–51)
HDLC SERPL-MCNC: 36 MG/DL (ref 40–60)
HGB BLD-MCNC: 14.6 G/DL (ref 13–17.7)
IMM GRANULOCYTES # BLD AUTO: 0.02 10*3/MM3 (ref 0–0.05)
IMM GRANULOCYTES NFR BLD AUTO: 0.3 % (ref 0–0.5)
LDLC SERPL CALC-MCNC: 85 MG/DL (ref 0–100)
LYMPHOCYTES # BLD AUTO: 3 10*3/MM3 (ref 0.7–3.1)
LYMPHOCYTES NFR BLD AUTO: 39.6 % (ref 19.6–45.3)
MCH RBC QN AUTO: 30.2 PG (ref 26.6–33)
MCHC RBC AUTO-ENTMCNC: 32.6 G/DL (ref 31.5–35.7)
MCV RBC AUTO: 92.6 FL (ref 79–97)
MICROALBUMIN UR-MCNC: 14.2 UG/ML
MONOCYTES # BLD AUTO: 0.74 10*3/MM3 (ref 0.1–0.9)
MONOCYTES NFR BLD AUTO: 9.8 % (ref 5–12)
NEUTROPHILS # BLD AUTO: 3.44 10*3/MM3 (ref 1.7–7)
NEUTROPHILS NFR BLD AUTO: 45.4 % (ref 42.7–76)
PLATELET # BLD AUTO: 139 10*3/MM3 (ref 140–450)
POTASSIUM SERPL-SCNC: 3.5 MMOL/L (ref 3.5–5.2)
PROT SERPL-MCNC: 6.7 G/DL (ref 6–8.5)
PSA SERPL-MCNC: 9.28 NG/ML (ref 0–4)
R RICKETTSI IGM SER-ACNC: 0.96 INDEX (ref 0–0.89)
RBC # BLD AUTO: 4.84 10*6/MM3 (ref 4.14–5.8)
SODIUM SERPL-SCNC: 143 MMOL/L (ref 136–145)
T4 FREE SERPL-MCNC: 1.16 NG/DL (ref 0.93–1.7)
TRIGL SERPL-MCNC: 340 MG/DL (ref 0–150)
TSH SERPL DL<=0.005 MIU/L-ACNC: 3.18 UIU/ML (ref 0.27–4.2)
URATE SERPL-MCNC: 4.9 MG/DL (ref 3.4–7)
VIT B12 SERPL-MCNC: 551 PG/ML (ref 211–946)
VLDLC SERPL CALC-MCNC: 56 MG/DL (ref 5–40)
WBC # BLD AUTO: 7.57 10*3/MM3 (ref 3.4–10.8)

## 2022-06-22 ENCOUNTER — OFFICE VISIT (OUTPATIENT)
Dept: INTERNAL MEDICINE | Facility: CLINIC | Age: 82
End: 2022-06-22

## 2022-06-22 VITALS
HEIGHT: 66 IN | OXYGEN SATURATION: 96 % | DIASTOLIC BLOOD PRESSURE: 78 MMHG | BODY MASS INDEX: 35.36 KG/M2 | HEART RATE: 62 BPM | TEMPERATURE: 97.7 F | RESPIRATION RATE: 16 BRPM | WEIGHT: 220 LBS | SYSTOLIC BLOOD PRESSURE: 120 MMHG

## 2022-06-22 DIAGNOSIS — Z12.5 PROSTATE CANCER SCREENING: ICD-10-CM

## 2022-06-22 DIAGNOSIS — R32 URINARY INCONTINENCE, UNSPECIFIED TYPE: ICD-10-CM

## 2022-06-22 DIAGNOSIS — M19.042 ARTHRITIS OF BOTH HANDS: Primary | ICD-10-CM

## 2022-06-22 DIAGNOSIS — T17.308A CHOKING, INITIAL ENCOUNTER: ICD-10-CM

## 2022-06-22 DIAGNOSIS — M19.041 ARTHRITIS OF BOTH HANDS: Primary | ICD-10-CM

## 2022-06-22 DIAGNOSIS — R97.20 ELEVATED PSA: ICD-10-CM

## 2022-06-22 DIAGNOSIS — J39.2 THROAT MASS: ICD-10-CM

## 2022-06-22 DIAGNOSIS — Z12.5 ENCOUNTER FOR SCREENING FOR MALIGNANT NEOPLASM OF PROSTATE: ICD-10-CM

## 2022-06-22 PROCEDURE — 99214 OFFICE O/P EST MOD 30 MIN: CPT | Performed by: INTERNAL MEDICINE

## 2022-06-22 NOTE — PROGRESS NOTES
Subjective     Patient ID: Gabriel Conley Jr. is a 81 y.o. male. Patient is here for management of multiple medical problems.     Chief Complaint   Patient presents with   • Hyperuricemia     History of Present Illness     Hyperuricemia    Urine incontance started with covid vacine.  Had blood urin following covid vaccine.      The following portions of the patient's history were reviewed and updated as appropriate: allergies, current medications, past family history, past medical history, past social history, past surgical history and problem list.    Review of Systems    Current Outpatient Medications:   •  aspirin (aspirin) 81 MG EC tablet, Take 1 tablet by mouth Daily., Disp: 90 tablet, Rfl: 3  •  atorvastatin (LIPITOR) 20 MG tablet, TAKE 1 TABLET BY MOUTH EVERY DAY, Disp: 90 tablet, Rfl: 3  •  cholecalciferol (VITAMIN D3) 1000 units tablet, Take 1,000 Units by mouth Daily., Disp: , Rfl:   •  Coenzyme Q10 100 MG tablet, Take 200 mg by mouth Daily., Disp: , Rfl:   •  Diclofenac Sodium (VOLTAREN) 1 % gel gel, Apply 4 g topically to the appropriate area as directed 4 (Four) Times a Day As Needed (pain)., Disp: 100 g, Rfl: 1  •  Eliquis 5 MG tablet tablet, TAKE 1 TABLET BY MOUTH TWICE A DAY, Disp: 180 tablet, Rfl: 3  •  febuxostat (ULORIC) 40 MG tablet, TAKE 1 TABLET BY MOUTH EVERY DAY, Disp: 90 tablet, Rfl: 3  •  furosemide (LASIX) 20 MG tablet, Take 1 tablet by mouth 3 (Three) Times a Week., Disp: 30 tablet, Rfl: 11  •  KLOR-CON 20 MEQ CR tablet, TAKE 1 TABLET BY MOUTH EVERY DAY, Disp: 90 tablet, Rfl: 3  •  melatonin 5 MG tablet tablet, Take 5 mg by mouth Every Night., Disp: , Rfl:   •  metoprolol tartrate (LOPRESSOR) 50 MG tablet, TAKE 1 TABLET BY MOUTH TWICE A DAY, Disp: 180 tablet, Rfl: 0  •  Misc Natural Products (OSTEO BI-FLEX ADV DOUBLE ST PO), Take 1 tablet by mouth 2 (Two) Times a Day., Disp: , Rfl:   •  nitroglycerin (NITROSTAT) 0.4 MG SL tablet, Place 0.4 mg under the tongue Every 5 (Five) Minutes As  "Needed., Disp: , Rfl:   •  omeprazole (priLOSEC) 20 MG capsule, TAKE 1 CAPSULE BY MOUTH EVERY DAY, Disp: 90 capsule, Rfl: 3  •  telmisartan-hydrochlorothiazide (MICARDIS HCT) 80-25 MG per tablet, TAKE 1 TABLET BY MOUTH EVERY DAY, Disp: 90 tablet, Rfl: 2    Objective      Blood pressure 120/78, pulse 62, temperature 97.7 °F (36.5 °C), resp. rate 16, height 166.4 cm (65.5\"), weight 99.8 kg (220 lb), SpO2 96 %.    Physical Exam     General Appearance:    Alert, cooperative, no distress, appears stated age   Head:    Normocephalic, without obvious abnormality, atraumatic   Eyes:    PERRL, conjunctiva/corneas clear, EOM's intact   Ears:    Normal TM's and external ear canals, both ears   Nose:   Nares normal, septum midline, mucosa normal, no drainage   or sinus tenderness   Throat:   Lips, mucosa, and tongue normal; teeth and gums normal   Neck:   Supple, symmetrical, trachea midline, no adenopathy;        thyroid:  No enlargement/tenderness/nodules; no carotid    bruit or JVD   Back:     Symmetric, no curvature, ROM normal, no CVA tenderness   Lungs:     Clear to auscultation bilaterally, respirations unlabored   Chest wall:    No tenderness or deformity   Heart:    Regular rate and rhythm, S1 and S2 normal, no murmur,        rub or gallop   Abdomen:     Soft, non-tender, bowel sounds active all four quadrants,     no masses, no organomegaly   Extremities:   Extremities normal, atraumatic, no cyanosis or edema   Pulses:   2+ and symmetric all extremities   Skin:   Skin color, texture, turgor normal, no rashes or lesions   Lymph nodes:   Cervical, supraclavicular, and axillary nodes normal   Neurologic:   CNII-XII intact. Normal strength, sensation and reflexes       throughout      Results for orders placed or performed in visit on 05/05/22   [place] Spotted Fever, IgM    Specimen: Blood   Result Value Ref Range    RMSF IgM 0.96 (H) 0.00 - 0.89 index   Uric acid    Specimen: Blood   Result Value Ref Range    " Uric Acid 4.9 3.4 - 7.0 mg/dL   Comprehensive Metabolic Panel    Specimen: Blood   Result Value Ref Range    Glucose 90 65 - 99 mg/dL    BUN 21 8 - 23 mg/dL    Creatinine 1.18 0.76 - 1.27 mg/dL    EGFR Result 62.4 >60.0 mL/min/1.73    BUN/Creatinine Ratio 17.8 7.0 - 25.0    Sodium 143 136 - 145 mmol/L    Potassium 3.5 3.5 - 5.2 mmol/L    Chloride 105 98 - 107 mmol/L    Total CO2 24.3 22.0 - 29.0 mmol/L    Calcium 9.7 8.6 - 10.5 mg/dL    Total Protein 6.7 6.0 - 8.5 g/dL    Albumin 3.90 3.50 - 5.20 g/dL    Globulin 2.8 gm/dL    A/G Ratio 1.4 g/dL    Total Bilirubin 0.4 0.0 - 1.2 mg/dL    Alkaline Phosphatase 59 39 - 117 U/L    AST (SGOT) 15 1 - 40 U/L    ALT (SGPT) 15 1 - 41 U/L   Vitamin B12    Specimen: Blood   Result Value Ref Range    Vitamin B-12 551 211 - 946 pg/mL   Lipid Panel    Specimen: Blood   Result Value Ref Range    Total Cholesterol 177 0 - 200 mg/dL    Triglycerides 340 (H) 0 - 150 mg/dL    HDL Cholesterol 36 (L) 40 - 60 mg/dL    VLDL Cholesterol Anselmo 56 (H) 5 - 40 mg/dL    LDL Chol Calc (NIH) 85 0 - 100 mg/dL   PSA Screen    Specimen: Blood   Result Value Ref Range    PSA 9.280 (H) 0.000 - 4.000 ng/mL   TSH    Specimen: Blood   Result Value Ref Range    TSH 3.180 0.270 - 4.200 uIU/mL   T4, Free    Specimen: Blood   Result Value Ref Range    Free T4 1.16 0.93 - 1.70 ng/dL   Hemoglobin A1c    Specimen: Blood   Result Value Ref Range    Hemoglobin A1C 5.80 (H) 4.80 - 5.60 %   MicroAlbumin, Urine, Random - Urine, Clean Catch    Specimen: Urine, Clean Catch   Result Value Ref Range    Microalbumin, Urine 14.2 Not Estab. ug/mL   CBC & Differential    Specimen: Blood   Result Value Ref Range    WBC 7.57 3.40 - 10.80 10*3/mm3    RBC 4.84 4.14 - 5.80 10*6/mm3    Hemoglobin 14.6 13.0 - 17.7 g/dL    Hematocrit 44.8 37.5 - 51.0 %    MCV 92.6 79.0 - 97.0 fL    MCH 30.2 26.6 - 33.0 pg    MCHC 32.6 31.5 - 35.7 g/dL    RDW 13.8 12.3 - 15.4 %    Platelets 139 (L) 140 - 450 10*3/mm3    Neutrophil Rel % 45.4 42.7 - 76.0  %    Lymphocyte Rel % 39.6 19.6 - 45.3 %    Monocyte Rel % 9.8 5.0 - 12.0 %    Eosinophil Rel % 4.4 0.3 - 6.2 %    Basophil Rel % 0.5 0.0 - 1.5 %    Neutrophils Absolute 3.44 1.70 - 7.00 10*3/mm3    Lymphocytes Absolute 3.00 0.70 - 3.10 10*3/mm3    Monocytes Absolute 0.74 0.10 - 0.90 10*3/mm3    Eosinophils Absolute 0.33 0.00 - 0.40 10*3/mm3    Basophils Absolute 0.04 0.00 - 0.20 10*3/mm3    Immature Granulocyte Rel % 0.3 0.0 - 0.5 %    Immature Grans Absolute 0.02 0.00 - 0.05 10*3/mm3         Assessment & Plan   Urine incontance started with covid vacine.  Had blood urine following covid vaccine.   Swelling in arm left from vaccine.    Diagnoses and all orders for this visit:    1. Arthritis of both hands (Primary)  -     Ambulatory Referral to Orthopedic Surgery    2. Throat mass  -     Ambulatory Referral to ENT (Otolaryngology)    3. Choking, initial encounter  -     Ambulatory Referral to ENT (Otolaryngology)    4. Elevated PSA  -     Ambulatory Referral to Urology    5. Urinary incontinence, unspecified type  -     Ambulatory Referral to Urology    6. Encounter for screening for malignant neoplasm of prostate     7. Prostate cancer screening  -     PSA Screen    Other orders  -     Diclofenac Sodium (VOLTAREN) 1 % gel gel; Apply 4 g topically to the appropriate area as directed 4 (Four) Times a Day As Needed (pain).  Dispense: 100 g; Refill: 1      Return in about 3 months (around 9/22/2022).          Patient Instructions   Osteo bi flex 2 pills a day.         Frank Torrez MD    Assessment & Plan

## 2022-08-05 RX ORDER — ATORVASTATIN CALCIUM 20 MG/1
TABLET, FILM COATED ORAL
Qty: 90 TABLET | Refills: 0 | Status: SHIPPED | OUTPATIENT
Start: 2022-08-05 | End: 2022-11-07

## 2022-08-07 DIAGNOSIS — I10 PRIMARY HYPERTENSION: ICD-10-CM

## 2022-08-08 RX ORDER — METOPROLOL TARTRATE 50 MG/1
TABLET, FILM COATED ORAL
Qty: 180 TABLET | Refills: 0 | Status: SHIPPED | OUTPATIENT
Start: 2022-08-08 | End: 2022-11-07

## 2022-08-16 NOTE — TELEPHONE ENCOUNTER
Rx Refill Note  Requested Prescriptions     Pending Prescriptions Disp Refills   • Diclofenac Sodium (VOLTAREN) 1 % gel gel [Pharmacy Med Name: DICLOFENAC SODIUM 1% GEL] 100 g 1     Sig: APPLY 4 G TOPICALLY TO THE APPROPRIATE AREA AS DIRECTED 4 (FOUR) TIMES A DAY AS NEEDED (PAIN).      Last office visit with prescribing clinician: 6/22/2022      Next office visit with prescribing clinician: 9/22/2022            KSENIA SHAIKH MA  08/16/22, 12:47 EDT

## 2022-08-23 DIAGNOSIS — I10 ESSENTIAL HYPERTENSION: ICD-10-CM

## 2022-08-23 RX ORDER — TELMISARTAN AND HYDROCHLORTHIAZIDE 80; 25 MG/1; MG/1
TABLET ORAL
Qty: 90 TABLET | Refills: 3 | Status: SHIPPED | OUTPATIENT
Start: 2022-08-23

## 2022-09-28 ENCOUNTER — OFFICE VISIT (OUTPATIENT)
Dept: CARDIOLOGY | Facility: CLINIC | Age: 82
End: 2022-09-28

## 2022-09-28 VITALS
DIASTOLIC BLOOD PRESSURE: 76 MMHG | OXYGEN SATURATION: 97 % | WEIGHT: 201 LBS | HEIGHT: 66 IN | SYSTOLIC BLOOD PRESSURE: 114 MMHG | BODY MASS INDEX: 32.3 KG/M2 | HEART RATE: 71 BPM

## 2022-09-28 DIAGNOSIS — I48.21 PERMANENT ATRIAL FIBRILLATION: ICD-10-CM

## 2022-09-28 DIAGNOSIS — I25.10 TRIPLE VESSEL CORONARY ARTERY DISEASE: Primary | ICD-10-CM

## 2022-09-28 DIAGNOSIS — E78.5 DYSLIPIDEMIA: ICD-10-CM

## 2022-09-28 DIAGNOSIS — I10 PRIMARY HYPERTENSION: ICD-10-CM

## 2022-09-28 DIAGNOSIS — N18.2 CKD (CHRONIC KIDNEY DISEASE) STAGE 2, GFR 60-89 ML/MIN: ICD-10-CM

## 2022-09-28 PROCEDURE — 99214 OFFICE O/P EST MOD 30 MIN: CPT | Performed by: INTERNAL MEDICINE

## 2022-09-28 NOTE — PROGRESS NOTES
"             [facility] Follow Up Note    Gabriel Conley Jr.  [id number]  2022    Primary Care Provider: Frank Torrez MD    Chief Complaint: Routine follow-up    History of Present Illness:   Mr. Gabriel Conley Jr. is a 81y.o. male who presents to the Cardiology Clinic for regular follow-up.  Mr. Conley has a past medical history which is significant for hypertension, dyslipidemia, stage II chronic kidney disease, and GERD his past medical history is also significant for peripheral vascular disease, with prior left subclavian artery stenting in .  He has a past cardiac history which is significant for suspected persistent atrial fibrillation and multivessel coronary artery disease reportedly diagnosed on coronary angiography in  in [place].  At that time, he subsequently underwent a four-vessel CABG. he returns today for routine follow-up.  Since his last appointment, the patient reports he has generally been well without any significant changes in his health.  He remains active, without exertional chest pain or angina.  No significant exertional dyspnea.  He continues to tolerate Eliquis without significant bleeding or bruising.  He does complain of an intermittent discomfort in his sternum, likely related to prior sternotomy.  He also reports a rare \"choking\" sensation, and believes the sternum may be shifting upward.  No recent history of significant lower extremity edema or orthopnea.  No other specific complaints today.     Past Cardiac Testin. Last Coronary Angio:                1. In [place], revealed multivessel disease  2. Prior Stress Testin. MPS : Reportedly unremarkable with normal perfusion, LVEF 65%  3. Last Echo: 2019              1.  Normal LV systolic function, LVEF 55-60%              2.  Normal left ventricular diastolic filling pattern.              3.  Normal right ventricular size and systolic function.      "         4.  Mild left atrial dilation.              5.  Trace mitral regurgitation.              6.  Trace tricuspid regurgitation.  4. Prior Holter Monitor: None    Review of Systems:   Review of Systems   Constitutional: Negative for activity change, appetite change, chills, diaphoresis, fatigue, fever, unexpected weight gain and unexpected weight loss.   Eyes: Negative for blurred vision and double vision.   Respiratory: Negative for cough, chest tightness, shortness of breath and wheezing.    Cardiovascular: Negative for chest pain, palpitations and leg swelling.   Gastrointestinal: Negative for abdominal pain, anal bleeding, blood in stool and GERD.   Endocrine: Negative for cold intolerance and heat intolerance.   Genitourinary: Negative for hematuria.   Neurological: Negative for dizziness, syncope, weakness and light-headedness.   Hematological: Does not bruise/bleed easily.   Psychiatric/Behavioral: Negative for depressed mood and stress. The patient is not nervous/anxious.        I have reviewed and/or updated the patient's past medical, past surgical, family, social history, problem list and allergies as appropriate.     Medications:     Current Outpatient Medications:   •  aspirin (aspirin) 81 MG EC tablet, Take 1 tablet by mouth Daily., Disp: 90 tablet, Rfl: 3  •  atorvastatin (LIPITOR) 20 MG tablet, TAKE 1 TABLET BY MOUTH EVERY DAY, Disp: 90 tablet, Rfl: 0  •  cholecalciferol (VITAMIN D3) 1000 units tablet, Take 1,000 Units by mouth Daily., Disp: , Rfl:   •  Coenzyme Q10 100 MG tablet, Take 200 mg by mouth Daily., Disp: , Rfl:   •  Diclofenac Sodium (VOLTAREN) 1 % gel gel, APPLY 4 G TOPICALLY TO THE APPROPRIATE AREA AS DIRECTED 4 (FOUR) TIMES A DAY AS NEEDED (PAIN)., Disp: 100 g, Rfl: 1  •  Eliquis 5 MG tablet tablet, TAKE 1 TABLET BY MOUTH TWICE A DAY, Disp: 180 tablet, Rfl: 3  •  febuxostat (ULORIC) 40 MG tablet, TAKE 1 TABLET BY MOUTH EVERY DAY, Disp: 90 tablet, Rfl: 3  •  furosemide (LASIX) 20 MG  "tablet, Take 1 tablet by mouth 3 (Three) Times a Week., Disp: 30 tablet, Rfl: 11  •  KLOR-CON 20 MEQ CR tablet, TAKE 1 TABLET BY MOUTH EVERY DAY, Disp: 90 tablet, Rfl: 3  •  melatonin 5 MG tablet tablet, Take 5 mg by mouth Every Night., Disp: , Rfl:   •  metoprolol tartrate (LOPRESSOR) 50 MG tablet, TAKE 1 TABLET BY MOUTH TWICE A DAY, Disp: 180 tablet, Rfl: 0  •  Misc Natural Products (OSTEO BI-FLEX ADV DOUBLE ST PO), Take 1 tablet by mouth 2 (Two) Times a Day., Disp: , Rfl:   •  nitroglycerin (NITROSTAT) 0.4 MG SL tablet, Place 0.4 mg under the tongue Every 5 (Five) Minutes As Needed., Disp: , Rfl:   •  omeprazole (priLOSEC) 20 MG capsule, TAKE 1 CAPSULE BY MOUTH EVERY DAY, Disp: 90 capsule, Rfl: 3  •  telmisartan-hydrochlorothiazide (MICARDIS HCT) 80-25 MG per tablet, TAKE 1 TABLET BY MOUTH EVERY DAY, Disp: 90 tablet, Rfl: 3    Physical Exam:  Vital Signs:   Vitals:    09/28/22 1246   BP: 114/76   BP Location: Left arm   Patient Position: Sitting   Cuff Size: Adult   Pulse: 71   SpO2: 97%   Weight: 91.2 kg (201 lb)   Height: 166.4 cm (65.5\")       Physical Exam  Constitutional:       General: He is not in acute distress.     Appearance: He is obese. He is not diaphoretic.   HENT:      Head: Normocephalic and atraumatic.   Cardiovascular:      Rate and Rhythm: Normal rate and regular rhythm.      Heart sounds: No murmur heard.  Pulmonary:      Effort: Pulmonary effort is normal. No respiratory distress.      Breath sounds: Normal breath sounds. No stridor. No wheezing, rhonchi or rales.   Abdominal:      General: Bowel sounds are normal. There is no distension.      Palpations: Abdomen is soft.      Tenderness: There is no abdominal tenderness. There is no guarding or rebound.   Musculoskeletal:         General: No swelling. Normal range of motion.      Cervical back: Neck supple. No tenderness.   Skin:     General: Skin is warm and dry.   Neurological:      General: No focal deficit present.      Mental Status: He " is alert and oriented to person, place, and time.   Psychiatric:         Mood and Affect: Mood normal.         Behavior: Behavior normal.         Results Review:   I reviewed the patient's new clinical results.      Assessment / Plan:     1.  Multivessel coronary artery disease  -- Status post four-vessel CABG in 2003  -- No current chest pain or exertional anginal symptoms  -- Continue high intensity statin  -- Previously on aspirin, however discontinued given anticoagulated with Eliquis  --Follow-up in 1 year, sooner if required     2.  Permanent atrial fibrillation  -- Remains asymptomatic, no symptoms to suggest RVR  --Continue metoprolol for rate control  --Continue Eliquis for CVA prophylaxis     3.  Essential hypertension  -- BP well controlled today    4.  Dyslipidemia  --Continue high intensity statin  --Lipid profile in 5/22 showed LDL 85, HDL 36, triglycerides 340    5.  Stage II CKD  --Last labs in 5/22 showed GFR 62      Follow Up:   Return in about 1 year (around 9/28/2023).      Thank you for allowing me to participate in the care of your patient. Please to not hesitate to contact me with additional questions or concerns.     GIL Savage MD  Interventional Cardiology   09/28/2022  12:50 EDT

## 2022-10-13 DIAGNOSIS — I48.21 PERMANENT ATRIAL FIBRILLATION: ICD-10-CM

## 2022-10-13 RX ORDER — APIXABAN 5 MG/1
TABLET, FILM COATED ORAL
Qty: 180 TABLET | Refills: 3 | Status: SHIPPED | OUTPATIENT
Start: 2022-10-13

## 2022-11-06 DIAGNOSIS — I10 PRIMARY HYPERTENSION: ICD-10-CM

## 2022-11-07 RX ORDER — METOPROLOL TARTRATE 50 MG/1
TABLET, FILM COATED ORAL
Qty: 180 TABLET | Refills: 3 | Status: SHIPPED | OUTPATIENT
Start: 2022-11-07

## 2022-11-07 RX ORDER — ATORVASTATIN CALCIUM 20 MG/1
TABLET, FILM COATED ORAL
Qty: 90 TABLET | Refills: 3 | Status: SHIPPED | OUTPATIENT
Start: 2022-11-07

## 2022-11-21 RX ORDER — FEBUXOSTAT 40 MG/1
TABLET, FILM COATED ORAL
Qty: 90 TABLET | Refills: 3 | Status: SHIPPED | OUTPATIENT
Start: 2022-11-21

## 2022-11-24 DIAGNOSIS — K27.9 PUD (PEPTIC ULCER DISEASE): ICD-10-CM

## 2022-11-24 DIAGNOSIS — K44.9 HERNIA, HIATAL: ICD-10-CM

## 2022-11-24 DIAGNOSIS — K21.9 GASTROESOPHAGEAL REFLUX DISEASE WITHOUT ESOPHAGITIS: ICD-10-CM

## 2022-11-28 RX ORDER — OMEPRAZOLE 20 MG/1
CAPSULE, DELAYED RELEASE ORAL
Qty: 90 CAPSULE | Refills: 3 | Status: SHIPPED | OUTPATIENT
Start: 2022-11-28

## 2023-03-20 RX ORDER — POTASSIUM CHLORIDE 1500 MG/1
TABLET, EXTENDED RELEASE ORAL
Qty: 90 TABLET | Refills: 3 | Status: SHIPPED | OUTPATIENT
Start: 2023-03-20

## 2023-07-24 ENCOUNTER — OFFICE VISIT (OUTPATIENT)
Dept: INTERNAL MEDICINE | Facility: CLINIC | Age: 83
End: 2023-07-24
Payer: MEDICARE

## 2023-07-24 VITALS
OXYGEN SATURATION: 95 % | WEIGHT: 217.5 LBS | BODY MASS INDEX: 34.96 KG/M2 | RESPIRATION RATE: 16 BRPM | HEIGHT: 66 IN | HEART RATE: 78 BPM | DIASTOLIC BLOOD PRESSURE: 62 MMHG | SYSTOLIC BLOOD PRESSURE: 120 MMHG | TEMPERATURE: 97.7 F

## 2023-07-24 DIAGNOSIS — R97.20 ELEVATED PSA: ICD-10-CM

## 2023-07-24 DIAGNOSIS — Z12.5 PROSTATE CANCER SCREENING: ICD-10-CM

## 2023-07-24 DIAGNOSIS — M79.641 PAIN IN BOTH HANDS: Primary | ICD-10-CM

## 2023-07-24 DIAGNOSIS — I10 PRIMARY HYPERTENSION: ICD-10-CM

## 2023-07-24 DIAGNOSIS — E79.0 HYPERURICEMIA: ICD-10-CM

## 2023-07-24 DIAGNOSIS — M79.642 PAIN IN BOTH HANDS: Primary | ICD-10-CM

## 2023-07-24 PROCEDURE — 99214 OFFICE O/P EST MOD 30 MIN: CPT | Performed by: INTERNAL MEDICINE

## 2023-07-24 NOTE — PROGRESS NOTES
Subjective     Patient ID: Gabriel Conley Jr. is a 82 y.o. male. Patient is here for management of multiple medical problems.     Chief Complaint   Patient presents with    Hand Pain     Bilateral hands     History of Present Illness   Hand pain b/l  Voltaran gel.  Not helping        The following portions of the patient's history were reviewed and updated as appropriate: allergies, current medications, past family history, past medical history, past social history, past surgical history and problem list.    Review of Systems    Current Outpatient Medications:     atorvastatin (LIPITOR) 20 MG tablet, TAKE 1 TABLET BY MOUTH EVERY DAY, Disp: 90 tablet, Rfl: 3    cholecalciferol (VITAMIN D3) 1000 units tablet, Take 1 tablet by mouth Daily., Disp: , Rfl:     Coenzyme Q10 100 MG tablet, Take 2 tablets by mouth Daily., Disp: , Rfl:     Diclofenac Sodium (VOLTAREN) 1 % gel gel, APPLY 4 G TOPICALLY TO THE APPROPRIATE AREA AS DIRECTED 4 (FOUR) TIMES A DAY AS NEEDED (PAIN)., Disp: 100 g, Rfl: 1    Eliquis 5 MG tablet tablet, TAKE 1 TABLET BY MOUTH TWICE A DAY, Disp: 180 tablet, Rfl: 3    febuxostat (ULORIC) 40 MG tablet, TAKE 1 TABLET BY MOUTH EVERY DAY, Disp: 90 tablet, Rfl: 3    furosemide (LASIX) 20 MG tablet, Take 1 tablet by mouth 3 (Three) Times a Week., Disp: 30 tablet, Rfl: 11    KLOR-CON 20 MEQ CR tablet, TAKE 1 TABLET BY MOUTH EVERY DAY, Disp: 90 tablet, Rfl: 3    melatonin 5 MG tablet tablet, Take 1 tablet by mouth Every Night., Disp: , Rfl:     metoprolol tartrate (LOPRESSOR) 50 MG tablet, TAKE 1 TABLET BY MOUTH TWICE A DAY, Disp: 180 tablet, Rfl: 3    Misc Natural Products (OSTEO BI-FLEX ADV DOUBLE ST PO), Take 1 tablet by mouth 2 (Two) Times a Day., Disp: , Rfl:     nitroglycerin (NITROSTAT) 0.4 MG SL tablet, Place 1 tablet under the tongue Every 5 (Five) Minutes As Needed., Disp: , Rfl:     omeprazole (priLOSEC) 20 MG capsule, TAKE 1 CAPSULE BY MOUTH EVERY DAY, Disp: 90 capsule, Rfl: 3     "telmisartan-hydrochlorothiazide (MICARDIS HCT) 80-25 MG per tablet, TAKE 1 TABLET BY MOUTH EVERY DAY, Disp: 90 tablet, Rfl: 3    Objective      Blood pressure 120/62, pulse 78, temperature 97.7 °F (36.5 °C), resp. rate 16, height 166.4 cm (65.5\"), weight 98.7 kg (217 lb 8 oz), SpO2 95 %.    Physical Exam     General Appearance:    Alert, cooperative, no distress, appears stated age   Head:    Normocephalic, without obvious abnormality, atraumatic   Eyes:    PERRL, conjunctiva/corneas clear, EOM's intact   Ears:    Normal TM's and external ear canals, both ears   Nose:   Nares normal, septum midline, mucosa normal, no drainage   or sinus tenderness   Throat:   Lips, mucosa, and tongue normal; teeth and gums normal   Neck:   Supple, symmetrical, trachea midline, no adenopathy;        thyroid:  No enlargement/tenderness/nodules; no carotid    bruit or JVD   Back:     Symmetric, no curvature, ROM normal, no CVA tenderness   Lungs:     Clear to auscultation bilaterally, respirations unlabored   Chest wall:    No tenderness or deformity   Heart:    Regular rate and rhythm, S1 and S2 normal, no murmur,        rub or gallop   Abdomen:     Soft, non-tender, bowel sounds active all four quadrants,     no masses, no organomegaly   Extremities:   Extremities normal, atraumatic, no cyanosis or edema   Pulses:   2+ and symmetric all extremities   Skin:   Skin color, texture, turgor normal, no rashes or lesions   Lymph nodes:   Cervical, supraclavicular, and axillary nodes normal   Neurologic:   CNII-XII intact. Normal strength, sensation and reflexes       throughout      Results for orders placed or performed in visit on 05/05/22   [place] Spotted Fever, IgM    Specimen: Blood   Result Value Ref Range    RMSF IgM 0.96 (H) 0.00 - 0.89 index   Uric acid    Specimen: Blood   Result Value Ref Range    Uric Acid 4.9 3.4 - 7.0 mg/dL   Comprehensive Metabolic Panel    Specimen: Blood   Result Value Ref Range    Glucose 90 65 - " 99 mg/dL    BUN 21 8 - 23 mg/dL    Creatinine 1.18 0.76 - 1.27 mg/dL    EGFR Result 62.4 >60.0 mL/min/1.73    BUN/Creatinine Ratio 17.8 7.0 - 25.0    Sodium 143 136 - 145 mmol/L    Potassium 3.5 3.5 - 5.2 mmol/L    Chloride 105 98 - 107 mmol/L    Total CO2 24.3 22.0 - 29.0 mmol/L    Calcium 9.7 8.6 - 10.5 mg/dL    Total Protein 6.7 6.0 - 8.5 g/dL    Albumin 3.90 3.50 - 5.20 g/dL    Globulin 2.8 gm/dL    A/G Ratio 1.4 g/dL    Total Bilirubin 0.4 0.0 - 1.2 mg/dL    Alkaline Phosphatase 59 39 - 117 U/L    AST (SGOT) 15 1 - 40 U/L    ALT (SGPT) 15 1 - 41 U/L   Vitamin B12    Specimen: Blood   Result Value Ref Range    Vitamin B-12 551 211 - 946 pg/mL   Lipid Panel    Specimen: Blood   Result Value Ref Range    Total Cholesterol 177 0 - 200 mg/dL    Triglycerides 340 (H) 0 - 150 mg/dL    HDL Cholesterol 36 (L) 40 - 60 mg/dL    VLDL Cholesterol Anselmo 56 (H) 5 - 40 mg/dL    LDL Chol Calc (NIH) 85 0 - 100 mg/dL   PSA Screen    Specimen: Blood   Result Value Ref Range    PSA 9.280 (H) 0.000 - 4.000 ng/mL   TSH    Specimen: Blood   Result Value Ref Range    TSH 3.180 0.270 - 4.200 uIU/mL   T4, Free    Specimen: Blood   Result Value Ref Range    Free T4 1.16 0.93 - 1.70 ng/dL   Hemoglobin A1c    Specimen: Blood   Result Value Ref Range    Hemoglobin A1C 5.80 (H) 4.80 - 5.60 %   MicroAlbumin, Urine, Random - Urine, Clean Catch    Specimen: Urine, Clean Catch   Result Value Ref Range    Microalbumin, Urine 14.2 Not Estab. ug/mL   CBC & Differential    Specimen: Blood   Result Value Ref Range    WBC 7.57 3.40 - 10.80 10*3/mm3    RBC 4.84 4.14 - 5.80 10*6/mm3    Hemoglobin 14.6 13.0 - 17.7 g/dL    Hematocrit 44.8 37.5 - 51.0 %    MCV 92.6 79.0 - 97.0 fL    MCH 30.2 26.6 - 33.0 pg    MCHC 32.6 31.5 - 35.7 g/dL    RDW 13.8 12.3 - 15.4 %    Platelets 139 (L) 140 - 450 10*3/mm3    Neutrophil Rel % 45.4 42.7 - 76.0 %    Lymphocyte Rel % 39.6 19.6 - 45.3 %    Monocyte Rel % 9.8 5.0 - 12.0 %    Eosinophil Rel % 4.4 0.3 - 6.2 %    Basophil  Rel % 0.5 0.0 - 1.5 %    Neutrophils Absolute 3.44 1.70 - 7.00 10*3/mm3    Lymphocytes Absolute 3.00 0.70 - 3.10 10*3/mm3    Monocytes Absolute 0.74 0.10 - 0.90 10*3/mm3    Eosinophils Absolute 0.33 0.00 - 0.40 10*3/mm3    Basophils Absolute 0.04 0.00 - 0.20 10*3/mm3    Immature Granulocyte Rel % 0.3 0.0 - 0.5 %    Immature Grans Absolute 0.02 0.00 - 0.05 10*3/mm3         Assessment & Plan       Diagnoses and all orders for this visit:    1. Pain in both hands (Primary)  -     Vitamin B12  -     CBC & Differential  -     Lipid Panel  -     Comprehensive Metabolic Panel  -     TSH  -     PSA Screen  -     Hemoglobin A1c  -     MicroAlbumin, Urine, Random - Urine, Clean Catch  -     Uric Acid  -     [place] Spotted Fever, IgM  -     Ambulatory Referral to Orthopedic Surgery  -     Sedimentation Rate  -     C-reactive Protein  -     Rheumatoid Factor  -     Cyclic Citrul Peptide Antibody, IgG / IgA    2. Prostate cancer screening  -     Vitamin B12  -     CBC & Differential  -     Lipid Panel  -     Comprehensive Metabolic Panel  -     TSH  -     PSA Screen  -     Hemoglobin A1c  -     MicroAlbumin, Urine, Random - Urine, Clean Catch  -     Uric Acid  -     [place] Spotted Fever, IgM  -     Sedimentation Rate  -     C-reactive Protein  -     Rheumatoid Factor  -     Cyclic Citrul Peptide Antibody, IgG / IgA    3. Hyperuricemia  -     Vitamin B12  -     CBC & Differential  -     Lipid Panel  -     Comprehensive Metabolic Panel  -     TSH  -     PSA Screen  -     Hemoglobin A1c  -     MicroAlbumin, Urine, Random - Urine, Clean Catch  -     Uric Acid  -     [place] Spotted Fever, IgM  -     Sedimentation Rate  -     C-reactive Protein  -     Rheumatoid Factor  -     Cyclic Citrul Peptide Antibody, IgG / IgA    4. Elevated PSA  -     Vitamin B12  -     CBC & Differential  -     Lipid Panel  -     Comprehensive Metabolic Panel  -     TSH  -     PSA Screen  -     Hemoglobin A1c  -     MicroAlbumin,  Urine, Random - Urine, Clean Catch  -     Uric Acid  -     [place] Spotted Fever, IgM  -     Sedimentation Rate  -     C-reactive Protein  -     Rheumatoid Factor  -     Cyclic Citrul Peptide Antibody, IgG / IgA    5. Primary hypertension  -     Vitamin B12  -     CBC & Differential  -     Lipid Panel  -     Comprehensive Metabolic Panel  -     TSH  -     PSA Screen  -     Hemoglobin A1c  -     MicroAlbumin, Urine, Random - Urine, Clean Catch  -     Uric Acid  -     [place] Spotted Fever, IgM  -     Sedimentation Rate  -     C-reactive Protein  -     Rheumatoid Factor  -     Cyclic Citrul Peptide Antibody, IgG / IgA      Return in about 6 weeks (around 9/4/2023) for Medicare Wellness.          There are no Patient Instructions on file for this visit.     Frank Torrez MD    Assessment & Plan

## 2023-07-29 LAB
ALBUMIN SERPL-MCNC: 4 G/DL (ref 3.5–5.2)
ALBUMIN/GLOB SERPL: 1.7 G/DL
ALP SERPL-CCNC: 64 U/L (ref 39–117)
ALT SERPL-CCNC: 16 U/L (ref 1–41)
AST SERPL-CCNC: 16 U/L (ref 1–40)
BASOPHILS # BLD AUTO: 0.06 10*3/MM3 (ref 0–0.2)
BASOPHILS NFR BLD AUTO: 0.8 % (ref 0–1.5)
BILIRUB SERPL-MCNC: 0.4 MG/DL (ref 0–1.2)
BUN SERPL-MCNC: 23 MG/DL (ref 8–23)
BUN/CREAT SERPL: 17.8 (ref 7–25)
CALCIUM SERPL-MCNC: 9.4 MG/DL (ref 8.6–10.5)
CCP IGA+IGG SERPL IA-ACNC: 40 UNITS (ref 0–19)
CHLORIDE SERPL-SCNC: 102 MMOL/L (ref 98–107)
CHOLEST SERPL-MCNC: 192 MG/DL (ref 0–200)
CO2 SERPL-SCNC: 27.8 MMOL/L (ref 22–29)
CREAT SERPL-MCNC: 1.29 MG/DL (ref 0.76–1.27)
CRP SERPL-MCNC: <0.3 MG/DL (ref 0–0.5)
EGFRCR SERPLBLD CKD-EPI 2021: 55.7 ML/MIN/1.73
EOSINOPHIL # BLD AUTO: 0.35 10*3/MM3 (ref 0–0.4)
EOSINOPHIL NFR BLD AUTO: 4.4 % (ref 0.3–6.2)
ERYTHROCYTE [DISTWIDTH] IN BLOOD BY AUTOMATED COUNT: 14.1 % (ref 12.3–15.4)
ERYTHROCYTE [SEDIMENTATION RATE] IN BLOOD BY WESTERGREN METHOD: 34 MM/HR (ref 0–20)
GLOBULIN SER CALC-MCNC: 2.4 GM/DL
GLUCOSE SERPL-MCNC: 95 MG/DL (ref 65–99)
HBA1C MFR BLD: 5.9 % (ref 4.8–5.6)
HCT VFR BLD AUTO: 42.7 % (ref 37.5–51)
HDLC SERPL-MCNC: 36 MG/DL (ref 40–60)
HGB BLD-MCNC: 14.6 G/DL (ref 13–17.7)
IMM GRANULOCYTES # BLD AUTO: 0.02 10*3/MM3 (ref 0–0.05)
IMM GRANULOCYTES NFR BLD AUTO: 0.3 % (ref 0–0.5)
LDLC SERPL CALC-MCNC: 91 MG/DL (ref 0–100)
LYMPHOCYTES # BLD AUTO: 3.06 10*3/MM3 (ref 0.7–3.1)
LYMPHOCYTES NFR BLD AUTO: 38.5 % (ref 19.6–45.3)
MCH RBC QN AUTO: 30.3 PG (ref 26.6–33)
MCHC RBC AUTO-ENTMCNC: 34.2 G/DL (ref 31.5–35.7)
MCV RBC AUTO: 88.6 FL (ref 79–97)
MICROALBUMIN UR-MCNC: 12 UG/ML
MONOCYTES # BLD AUTO: 0.77 10*3/MM3 (ref 0.1–0.9)
MONOCYTES NFR BLD AUTO: 9.7 % (ref 5–12)
NEUTROPHILS # BLD AUTO: 3.69 10*3/MM3 (ref 1.7–7)
NEUTROPHILS NFR BLD AUTO: 46.3 % (ref 42.7–76)
PLATELET # BLD AUTO: 140 10*3/MM3 (ref 140–450)
POTASSIUM SERPL-SCNC: 3.7 MMOL/L (ref 3.5–5.2)
PROT SERPL-MCNC: 6.4 G/DL (ref 6–8.5)
PSA SERPL-MCNC: 9.13 NG/ML (ref 0–4)
R RICKETTSI IGM SER-ACNC: 3.16 INDEX (ref 0–0.89)
RBC # BLD AUTO: 4.82 10*6/MM3 (ref 4.14–5.8)
RHEUMATOID FACT SERPL-ACNC: <10 IU/ML
SODIUM SERPL-SCNC: 141 MMOL/L (ref 136–145)
TRIGL SERPL-MCNC: 394 MG/DL (ref 0–150)
TSH SERPL DL<=0.005 MIU/L-ACNC: 3.39 UIU/ML (ref 0.27–4.2)
URATE SERPL-MCNC: 5.4 MG/DL (ref 3.4–7)
VIT B12 SERPL-MCNC: 419 PG/ML (ref 211–946)
VLDLC SERPL CALC-MCNC: 65 MG/DL (ref 5–40)
WBC # BLD AUTO: 7.95 10*3/MM3 (ref 3.4–10.8)

## 2023-08-03 ENCOUNTER — OFFICE VISIT (OUTPATIENT)
Dept: INTERNAL MEDICINE | Facility: CLINIC | Age: 83
End: 2023-08-03
Payer: MEDICARE

## 2023-08-03 VITALS
TEMPERATURE: 96.7 F | HEART RATE: 57 BPM | HEIGHT: 66 IN | SYSTOLIC BLOOD PRESSURE: 122 MMHG | WEIGHT: 216 LBS | DIASTOLIC BLOOD PRESSURE: 70 MMHG | BODY MASS INDEX: 34.72 KG/M2 | OXYGEN SATURATION: 97 % | RESPIRATION RATE: 16 BRPM

## 2023-08-03 DIAGNOSIS — M19.90 ARTHRITIS: ICD-10-CM

## 2023-08-03 DIAGNOSIS — A77.0 RMSF (ROCKY MOUNTAIN SPOTTED FEVER): Primary | ICD-10-CM

## 2023-08-03 RX ORDER — DOXYCYCLINE HYCLATE 100 MG/1
100 CAPSULE ORAL 2 TIMES DAILY
Qty: 60 CAPSULE | Refills: 1 | Status: SHIPPED | OUTPATIENT
Start: 2023-08-03

## 2023-08-16 ENCOUNTER — TELEPHONE (OUTPATIENT)
Dept: INTERNAL MEDICINE | Facility: CLINIC | Age: 83
End: 2023-08-16

## 2023-08-16 RX ORDER — AMOXICILLIN AND CLAVULANATE POTASSIUM 875; 125 MG/1; MG/1
1 TABLET, FILM COATED ORAL EVERY 12 HOURS SCHEDULED
Qty: 60 TABLET | Refills: 1 | Status: SHIPPED | OUTPATIENT
Start: 2023-08-16

## 2023-08-16 NOTE — TELEPHONE ENCOUNTER
Caller: Gabriel Conley Jr.    Relationship: Self    Best call back number: [phone]     What is the best time to reach you: ANYTIME, OK TO LEAVE VOICEMAIL.    Who are you requesting to speak with (clinical staff, provider,  specific staff member): CLINICAL STAFF    Do you know the name of the person who called: PATIENT    What was the call regarding: PATIENT ADVISES THAT MEDICATION HE IS ON IS CAUSING UNWANTED SIDE EFFECTS. PATIENT WOULD LIKE A CALL BACK TO DISCUSS ALTERNATIVES.    Is it okay if the provider responds through MyChart: NO CALL ONLY

## 2023-08-16 NOTE — TELEPHONE ENCOUNTER
Patient states that the doxy is causing vomiting and diarrhea and is now also causing insomnia. Would like an alternative medicine. Please advise.

## 2023-08-18 DIAGNOSIS — I10 ESSENTIAL HYPERTENSION: ICD-10-CM

## 2023-08-18 RX ORDER — TELMISARTAN AND HYDROCHLORTHIAZIDE 80; 25 MG/1; MG/1
TABLET ORAL
Qty: 90 TABLET | Refills: 0 | Status: SHIPPED | OUTPATIENT
Start: 2023-08-18

## 2023-09-13 ENCOUNTER — OFFICE VISIT (OUTPATIENT)
Dept: INTERNAL MEDICINE | Facility: CLINIC | Age: 83
End: 2023-09-13
Payer: MEDICARE

## 2023-09-13 VITALS
SYSTOLIC BLOOD PRESSURE: 130 MMHG | WEIGHT: 218 LBS | RESPIRATION RATE: 16 BRPM | DIASTOLIC BLOOD PRESSURE: 54 MMHG | HEIGHT: 66 IN | OXYGEN SATURATION: 95 % | HEART RATE: 64 BPM | TEMPERATURE: 97.1 F | BODY MASS INDEX: 35.03 KG/M2

## 2023-09-13 DIAGNOSIS — Z00.00 ROUTINE GENERAL MEDICAL EXAMINATION AT A HEALTH CARE FACILITY: Primary | ICD-10-CM

## 2023-09-13 PROCEDURE — 3078F DIAST BP <80 MM HG: CPT | Performed by: INTERNAL MEDICINE

## 2023-09-13 PROCEDURE — G0439 PPPS, SUBSEQ VISIT: HCPCS | Performed by: INTERNAL MEDICINE

## 2023-09-13 PROCEDURE — 1170F FXNL STATUS ASSESSED: CPT | Performed by: INTERNAL MEDICINE

## 2023-09-13 PROCEDURE — 3075F SYST BP GE 130 - 139MM HG: CPT | Performed by: INTERNAL MEDICINE

## 2023-09-13 RX ORDER — KETOCONAZOLE 20 MG/ML
SHAMPOO TOPICAL
COMMUNITY
Start: 2023-08-01

## 2023-09-13 RX ORDER — FLUOCINONIDE TOPICAL SOLUTION USP, 0.05% 0.5 MG/ML
SOLUTION TOPICAL
COMMUNITY
Start: 2023-08-01

## 2023-09-13 RX ORDER — GLUCOSAMINE HCL 500 MG
200 TABLET ORAL DAILY
Qty: 90 TABLET | Refills: 5
Start: 2023-09-13

## 2023-09-13 NOTE — PROGRESS NOTES
The ABCs of the Annual Wellness Visit  Subsequent Medicare Wellness Visit    Subjective      Gabriel Conley Jr. is a 82 y.o. male who presents for a Subsequent Medicare Wellness Visit.    The following portions of the patient's history were reviewed and   updated as appropriate: allergies, current medications, past family history, past medical history, past social history, past surgical history, and problem list.    Compared to one year ago, the patient feels his physical   health is the same.    Compared to one year ago, the patient feels his mental   health is the same.    Recent Hospitalizations:  He was not admitted to the hospital during the last year.       Current Medical Providers:  Patient Care Team:  Frank Torrez MD as PCP - General (Internal Medicine)  Ifeanyi Conley MD as Consulting Physician (General Surgery)    Outpatient Medications Prior to Visit   Medication Sig Dispense Refill    atorvastatin (LIPITOR) 20 MG tablet TAKE 1 TABLET BY MOUTH EVERY DAY 90 tablet 3    cholecalciferol (VITAMIN D3) 1000 units tablet Take 1 tablet by mouth Daily.      Diclofenac Sodium (VOLTAREN) 1 % gel gel APPLY 4 G TOPICALLY TO THE APPROPRIATE AREA AS DIRECTED 4 (FOUR) TIMES A DAY AS NEEDED (PAIN). 100 g 1    doxycycline (VIBRAMYCIN) 100 MG capsule Take 1 capsule by mouth 2 (Two) Times a Day. 60 capsule 1    Eliquis 5 MG tablet tablet TAKE 1 TABLET BY MOUTH TWICE A  tablet 3    febuxostat (ULORIC) 40 MG tablet TAKE 1 TABLET BY MOUTH EVERY DAY 90 tablet 3    fluocinonide (LIDEX) 0.05 % external solution APPLY TO AFFECTED AREA AT BEDTIME FOR 2 WEEKS THEN OFF 2 WEEKS REPEAT AS NEEDED      furosemide (LASIX) 20 MG tablet Take 1 tablet by mouth 3 (Three) Times a Week. 30 tablet 11    ketoconazole (NIZORAL) 2 % shampoo 1 APPLICATION EVERY OTHER DAY TOPICALLY LET THE SHAMPOO SOAK IN FOR 5 MIN AND RINSE OFF WELL.      KLOR-CON 20 MEQ CR tablet TAKE 1 TABLET BY MOUTH EVERY DAY 90 tablet 3    melatonin 5 MG tablet  tablet Take 1 tablet by mouth Every Night.      metoprolol tartrate (LOPRESSOR) 50 MG tablet TAKE 1 TABLET BY MOUTH TWICE A  tablet 3    nitroglycerin (NITROSTAT) 0.4 MG SL tablet Place 1 tablet under the tongue Every 5 (Five) Minutes As Needed.      omeprazole (priLOSEC) 20 MG capsule TAKE 1 CAPSULE BY MOUTH EVERY DAY 90 capsule 3    telmisartan-hydrochlorothiazide (MICARDIS HCT) 80-25 MG per tablet TAKE 1 TABLET BY MOUTH EVERY DAY 90 tablet 0    Coenzyme Q10 100 MG tablet Take 2 tablets by mouth Daily.      Misc Natural Products (OSTEO BI-FLEX ADV DOUBLE ST PO) Take 1 tablet by mouth 2 (Two) Times a Day.      amoxicillin-clavulanate (AUGMENTIN) 875-125 MG per tablet Take 1 tablet by mouth Every 12 (Twelve) Hours. 60 tablet 1     No facility-administered medications prior to visit.       No opioid medication identified on active medication list. I have reviewed chart for other potential  high risk medication/s and harmful drug interactions in the elderly.        Aspirin is not on active medication list.  Aspirin use is not indicated based on review of current medical condition/s. Risk of harm outweighs potential benefits.  .    Patient Active Problem List   Diagnosis    Angina pectoris    Triple vessel coronary artery disease    Dyslipidemia    Elevated fasting glucose    Encounter for long-term (current) use of medications    Hernia, hiatal    Hypertension    Melena    Prediabetes    BMI 36.0-36.9,adult    Vitamin D deficiency    Encounter for special screening examination for neoplasm of prostate    Screening for colon cancer    Permanent atrial fibrillation    Exudative age-related macular degeneration of right eye with active choroidal neovascularization    Obesity    Diabetes mellitus    Nocturnal hypoxia    Chronic back pain    CKD (chronic kidney disease) stage 2, GFR 60-89 ml/min    Heart failure    Hemorrhagic disorder due to circulating anticoagulants    Mixed hyperlipidemia    Long term current  "use of anticoagulant therapy    Noncompliance with treatment    Taking multiple medications for chronic disease    Chronic systolic heart failure    Encounter for current long term use of antiplatelet drug    Gastroesophageal reflux disease    History of renal calculi    Macular degeneration    Obstructive sleep apnea syndrome    Polycythemia vera    Stenosis of subclavian artery    Coronary artery disease with stable angina pectoris     Advance Care Planning   Advance Care Planning     Advance Directive is not on file.  ACP discussion was held with the patient during this visit. Patient does not have an advance directive, declines further assistance.     Objective    Vitals:    09/13/23 1350   BP: 130/54   Pulse: 64   Resp: 16   Temp: 97.1 °F (36.2 °C)   SpO2: 95%   Weight: 98.9 kg (218 lb)   Height: 166.4 cm (65.5\")   PainSc:   4     Estimated body mass index is 35.73 kg/m² as calculated from the following:    Height as of this encounter: 166.4 cm (65.5\").    Weight as of this encounter: 98.9 kg (218 lb).    Class 2 Severe Obesity (BMI >=35 and <=39.9). Obesity-related health conditions include the following: hypertension, coronary heart disease, diabetes mellitus, and dyslipidemias. Obesity is unchanged. BMI is is above average; BMI management plan is completed. We discussed portion control and increasing exercise.  General Appearance:    Alert, cooperative, no distress, appears stated age   Head:    Normocephalic, without obvious abnormality, atraumatic   Eyes:    PERRL, conjunctiva/corneas clear, EOM's intact   Ears:    Normal TM's and external ear canals, both ears   Nose:   Nares normal, septum midline, mucosa normal, no drainage   or sinus tenderness   Throat:   Lips, mucosa, and tongue normal; teeth and gums normal   Neck:   Supple, symmetrical, trachea midline, no adenopathy;        thyroid:  No enlargement/tenderness/nodules; no carotid    bruit or JVD   Back:     Symmetric, no curvature, ROM normal, no " CVA tenderness   Lungs:     Clear to auscultation bilaterally, respirations unlabored   Chest wall:    No tenderness or deformity   Heart:    Regular rate and rhythm, S1 and S2 normal, no murmur,        rub or gallop   Abdomen:     Soft, non-tender, bowel sounds active all four quadrants,     no masses, no organomegaly   Extremities:   Extremities normal, atraumatic, no cyanosis or edema   Pulses:   2+ and symmetric all extremities   Skin:   Skin color, texture, turgor normal, no rashes or lesions   Lymph nodes:   Cervical, supraclavicular, and axillary nodes normal   Neurologic:   CNII-XII intact. Normal strength, sensation and reflexes       throughout        Does the patient have evidence of cognitive impairment?   No    Lab Results   Component Value Date    CHLPL 192 2023    TRIG 394 (H) 2023    HDL 36 (L) 2023    LDL 91 2023    VLDL 65 (H) 2023    HGBA1C 5.90 (H) 2023          HEALTH RISK ASSESSMENT    Smoking Status:  Social History     Tobacco Use   Smoking Status Never   Smokeless Tobacco Never     Alcohol Consumption:  Social History     Substance and Sexual Activity   Alcohol Use No     Fall Risk Screen:    STEADI Fall Risk Assessment was completed, and patient is at LOW risk for falls.Assessment completed on:2023    Depression Screenin/13/2023     1:56 PM   PHQ-2/PHQ-9 Depression Screening   Little Interest or Pleasure in Doing Things 0-->not at all   Feeling Down, Depressed or Hopeless 0-->not at all   PHQ-9: Brief Depression Severity Measure Score 0       Health Habits and Functional and Cognitive Screenin/13/2023     1:56 PM   Functional & Cognitive Status   Do you have difficulty preparing food and eating? No   Do you have difficulty bathing yourself, getting dressed or grooming yourself? No   Do you have difficulty using the toilet? No   Do you have difficulty moving around from place to place? No   Do you have trouble with steps or getting  out of a bed or a chair? No   Current Diet Other   Dental Exam Up to date   Eye Exam Up to date   Exercise (times per week) 1 times per week   Current Exercises Include Walking   Do you need help using the phone?  No   Are you deaf or do you have serious difficulty hearing?  No   Do you need help to go to places out of walking distance? No   Do you need help shopping? No   Do you need help preparing meals?  No   Do you need help with housework?  No   Do you need help with laundry? No   Do you need help taking your medications? No   Do you need help managing money? No   Do you ever drive or ride in a car without wearing a seat belt? No   Have you felt unusual stress, anger or loneliness in the last month? Yes   Who do you live with? Spouse   If you need help, do you have trouble finding someone available to you? No   Have you been bothered in the last four weeks by sexual problems? No   Do you have difficulty concentrating, remembering or making decisions? No       Age-appropriate Screening Schedule:  Refer to the list below for future screening recommendations based on patient's age, sex and/or medical conditions. Orders for these recommended tests are listed in the plan section. The patient has been provided with a written plan.    Health Maintenance   Topic Date Due    BMI FOLLOWUP  Never done    ANNUAL WELLNESS VISIT  05/05/2023    INFLUENZA VACCINE  10/01/2023    HEMOGLOBIN A1C  01/27/2024    DIABETIC EYE EXAM  06/12/2024    LIPID PANEL  07/27/2024    URINE MICROALBUMIN  07/27/2024    COLORECTAL CANCER SCREENING  03/13/2028    TDAP/TD VACCINES (2 - Td or Tdap) 03/07/2029    COVID-19 Vaccine  Discontinued    Pneumococcal Vaccine 65+  Discontinued    ZOSTER VACCINE  Discontinued                  CMS Preventative Services Quick Reference  Risk Factors Identified During Encounter:    Fall Risk-High or Moderate: Discussed Fall Prevention in the home and uses keller and some tipes tripon keller. Doesn't want  walker.    The above risks/problems have been discussed with the patient.  Pertinent information has been shared with the patient in the After Visit Summary.    There are no diagnoses linked to this encounter.    Follow Up:   Next Medicare Wellness visit to be scheduled in 1 year.      An After Visit Summary and PPPS were made available to the patient.

## 2023-09-27 ENCOUNTER — TELEPHONE (OUTPATIENT)
Dept: CARDIOLOGY | Facility: CLINIC | Age: 83
End: 2023-09-27

## 2023-09-27 NOTE — TELEPHONE ENCOUNTER
Sw patient, explained this is the next available appt spot for a follow up appt.  Stated understanding.  He was not having any issues. He had to reschedule because he does not drive in the rain due to vision issues.

## 2023-09-27 NOTE — TELEPHONE ENCOUNTER
Caller: Gabriel Conley Jr.    Relationship to patient: Self    Best call back number: [phone]    Chief complaint:     Type of visit: FOLLOW UP    Requested date: SOONER     If rescheduling, when is the original appointment: 12.8.2023     Additional notes:PATIENT WOULD LIKE TO BE SEEN SOONER PLEASE CALL IF POSSIBLE

## 2023-11-16 DIAGNOSIS — K21.9 GASTROESOPHAGEAL REFLUX DISEASE WITHOUT ESOPHAGITIS: ICD-10-CM

## 2023-11-16 DIAGNOSIS — I10 ESSENTIAL HYPERTENSION: ICD-10-CM

## 2023-11-16 DIAGNOSIS — K44.9 HERNIA, HIATAL: ICD-10-CM

## 2023-11-16 DIAGNOSIS — I10 PRIMARY HYPERTENSION: ICD-10-CM

## 2023-11-16 DIAGNOSIS — K27.9 PUD (PEPTIC ULCER DISEASE): ICD-10-CM

## 2023-11-17 DIAGNOSIS — I48.21 PERMANENT ATRIAL FIBRILLATION: ICD-10-CM

## 2023-11-17 RX ORDER — OMEPRAZOLE 20 MG/1
CAPSULE, DELAYED RELEASE ORAL
Qty: 90 CAPSULE | Refills: 3 | Status: SHIPPED | OUTPATIENT
Start: 2023-11-17

## 2023-11-17 RX ORDER — TELMISARTAN AND HYDROCHLORTHIAZIDE 80; 25 MG/1; MG/1
TABLET ORAL
Qty: 90 TABLET | Refills: 0 | Status: SHIPPED | OUTPATIENT
Start: 2023-11-17

## 2023-11-17 RX ORDER — ATORVASTATIN CALCIUM 20 MG/1
TABLET, FILM COATED ORAL
Qty: 90 TABLET | Refills: 0 | Status: SHIPPED | OUTPATIENT
Start: 2023-11-17

## 2023-11-17 RX ORDER — FEBUXOSTAT 40 MG/1
TABLET, FILM COATED ORAL
Qty: 90 TABLET | Refills: 3 | Status: SHIPPED | OUTPATIENT
Start: 2023-11-17

## 2023-11-17 RX ORDER — METOPROLOL TARTRATE 50 MG/1
TABLET, FILM COATED ORAL
Qty: 180 TABLET | Refills: 3 | Status: SHIPPED | OUTPATIENT
Start: 2023-11-17

## 2023-11-20 RX ORDER — APIXABAN 5 MG/1
TABLET, FILM COATED ORAL
Qty: 180 TABLET | Refills: 0 | Status: SHIPPED | OUTPATIENT
Start: 2023-11-20

## 2023-11-27 ENCOUNTER — OFFICE VISIT (OUTPATIENT)
Dept: INTERNAL MEDICINE | Facility: CLINIC | Age: 83
End: 2023-11-27
Payer: MEDICARE

## 2023-11-27 VITALS
OXYGEN SATURATION: 97 % | HEART RATE: 64 BPM | HEIGHT: 66 IN | DIASTOLIC BLOOD PRESSURE: 62 MMHG | RESPIRATION RATE: 16 BRPM | WEIGHT: 218 LBS | TEMPERATURE: 97.6 F | SYSTOLIC BLOOD PRESSURE: 130 MMHG | BODY MASS INDEX: 35.03 KG/M2

## 2023-11-27 DIAGNOSIS — E79.0 HYPERURICEMIA: ICD-10-CM

## 2023-11-27 DIAGNOSIS — M19.90 ARTHRITIS: ICD-10-CM

## 2023-11-27 DIAGNOSIS — G72.0 STATIN MYOPATHY: Primary | ICD-10-CM

## 2023-11-27 DIAGNOSIS — T46.6X5A STATIN MYOPATHY: Primary | ICD-10-CM

## 2023-11-27 DIAGNOSIS — A77.0 RMSF (ROCKY MOUNTAIN SPOTTED FEVER): ICD-10-CM

## 2023-11-27 RX ORDER — ATORVASTATIN CALCIUM 10 MG/1
20 TABLET, FILM COATED ORAL DAILY
Qty: 90 TABLET | Refills: 3 | Status: SHIPPED | OUTPATIENT
Start: 2023-11-27

## 2023-11-27 NOTE — PROGRESS NOTES
Subjective     Patient ID: Gabriel Conley Jr. is a 83 y.o. male. Patient is here for management of multiple medical problems.     Chief Complaint   Patient presents with    RMSF    Hypertension     History of Present Illness   RMSF      Hypertension.  Muscle weakness.   Arthrits. Had to have help getting. Feels some better with co a 10. Will decrease Lipitor to 10mg.      CABG and stents.        The following portions of the patient's history were reviewed and updated as appropriate: allergies, current medications, past family history, past medical history, past social history, past surgical history and problem list.    Review of Systems    Current Outpatient Medications:     atorvastatin (LIPITOR) 10 MG tablet, Take 2 tablets by mouth Daily., Disp: 90 tablet, Rfl: 3    cholecalciferol (VITAMIN D3) 1000 units tablet, Take 1 tablet by mouth Daily., Disp: , Rfl:     Coenzyme Q10 100 MG tablet, Take 2 tablets by mouth Daily., Disp: 90 tablet, Rfl: 5    Diclofenac Sodium (VOLTAREN) 1 % gel gel, APPLY 4 G TOPICALLY TO THE APPROPRIATE AREA AS DIRECTED 4 (FOUR) TIMES A DAY AS NEEDED (PAIN)., Disp: 100 g, Rfl: 1    Eliquis 5 MG tablet tablet, TAKE 1 TABLET BY MOUTH TWICE A DAY, Disp: 180 tablet, Rfl: 0    febuxostat (ULORIC) 40 MG tablet, TAKE 1 TABLET BY MOUTH EVERY DAY, Disp: 90 tablet, Rfl: 3    fluocinonide (LIDEX) 0.05 % external solution, APPLY TO AFFECTED AREA AT BEDTIME FOR 2 WEEKS THEN OFF 2 WEEKS REPEAT AS NEEDED, Disp: , Rfl:     furosemide (LASIX) 20 MG tablet, Take 1 tablet by mouth 3 (Three) Times a Week., Disp: 30 tablet, Rfl: 11    ketoconazole (NIZORAL) 2 % shampoo, 1 APPLICATION EVERY OTHER DAY TOPICALLY LET THE SHAMPOO SOAK IN FOR 5 MIN AND RINSE OFF WELL., Disp: , Rfl:     KLOR-CON 20 MEQ CR tablet, TAKE 1 TABLET BY MOUTH EVERY DAY, Disp: 90 tablet, Rfl: 3    melatonin 5 MG tablet tablet, Take 1 tablet by mouth Every Night., Disp: , Rfl:     metoprolol tartrate (LOPRESSOR) 50 MG tablet, TAKE 1 TABLET BY  "MOUTH TWICE A DAY, Disp: 180 tablet, Rfl: 3    Misc Natural Products (Osteo Bi-Flex Adv Double St) tablet, Take 2 tablets by mouth 2 (Two) Times a Day., Disp: 180 each, Rfl: 3    nitroglycerin (NITROSTAT) 0.4 MG SL tablet, Place 1 tablet under the tongue Every 5 (Five) Minutes As Needed., Disp: , Rfl:     omeprazole (priLOSEC) 20 MG capsule, TAKE 1 CAPSULE BY MOUTH EVERY DAY, Disp: 90 capsule, Rfl: 3    telmisartan-hydrochlorothiazide (MICARDIS HCT) 80-25 MG per tablet, TAKE 1 TABLET BY MOUTH EVERY DAY, Disp: 90 tablet, Rfl: 0    Objective      Blood pressure 130/62, pulse 64, temperature 97.6 °F (36.4 °C), resp. rate 16, height 166.4 cm (65.5\"), weight 98.9 kg (218 lb), SpO2 97%.            Physical Exam     General Appearance:    Alert, cooperative, no distress, appears stated age   Head:    Normocephalic, without obvious abnormality, atraumatic   Eyes:    PERRL, conjunctiva/corneas clear, EOM's intact   Ears:    Normal TM's and external ear canals, both ears   Nose:   Nares normal, septum midline, mucosa normal, no drainage   or sinus tenderness   Throat:   Lips, mucosa, and tongue normal; teeth and gums normal   Neck:   Supple, symmetrical, trachea midline, no adenopathy;        thyroid:  No enlargement/tenderness/nodules; no carotid    bruit or JVD   Back:     Symmetric, no curvature, ROM normal, no CVA tenderness   Lungs:     Clear to auscultation bilaterally, respirations unlabored   Chest wall:    No tenderness or deformity   Heart:    Regular rate and rhythm, S1 and S2 normal, no murmur,        rub or gallop   Abdomen:     Soft, non-tender, bowel sounds active all four quadrants,     no masses, no organomegaly   Extremities:   Extremities normal, atraumatic, no cyanosis or edema   Pulses:   2+ and symmetric all extremities   Skin:   Skin color, texture, turgor normal, no rashes or lesions   Lymph nodes:   Cervical, supraclavicular, and axillary nodes normal   Neurologic:   CNII-XII intact. Normal strength, " sensation and reflexes       throughout      Results for orders placed or performed in visit on 07/24/23   Vitamin B12    Specimen: Blood   Result Value Ref Range    Vitamin B-12 419 211 - 946 pg/mL   Lipid Panel    Specimen: Blood   Result Value Ref Range    Total Cholesterol 192 0 - 200 mg/dL    Triglycerides 394 (H) 0 - 150 mg/dL    HDL Cholesterol 36 (L) 40 - 60 mg/dL    VLDL Cholesterol Anselmo 65 (H) 5 - 40 mg/dL    LDL Chol Calc (NIH) 91 0 - 100 mg/dL   Comprehensive Metabolic Panel    Specimen: Blood   Result Value Ref Range    Glucose 95 65 - 99 mg/dL    BUN 23 8 - 23 mg/dL    Creatinine 1.29 (H) 0.76 - 1.27 mg/dL    EGFR Result 55.7 (L) >60.0 mL/min/1.73    BUN/Creatinine Ratio 17.8 7.0 - 25.0    Sodium 141 136 - 145 mmol/L    Potassium 3.7 3.5 - 5.2 mmol/L    Chloride 102 98 - 107 mmol/L    Total CO2 27.8 22.0 - 29.0 mmol/L    Calcium 9.4 8.6 - 10.5 mg/dL    Total Protein 6.4 6.0 - 8.5 g/dL    Albumin 4.0 3.5 - 5.2 g/dL    Globulin 2.4 gm/dL    A/G Ratio 1.7 g/dL    Total Bilirubin 0.4 0.0 - 1.2 mg/dL    Alkaline Phosphatase 64 39 - 117 U/L    AST (SGOT) 16 1 - 40 U/L    ALT (SGPT) 16 1 - 41 U/L   TSH    Specimen: Blood   Result Value Ref Range    TSH 3.390 0.270 - 4.200 uIU/mL   PSA Screen    Specimen: Blood   Result Value Ref Range    PSA 9.130 (H) 0.000 - 4.000 ng/mL   Hemoglobin A1c    Specimen: Blood   Result Value Ref Range    Hemoglobin A1C 5.90 (H) 4.80 - 5.60 %   MicroAlbumin, Urine, Random - Urine, Clean Catch    Specimen: Urine, Clean Catch   Result Value Ref Range    Microalbumin, Urine 12.0 Not Estab. ug/mL   Uric Acid    Specimen: Blood   Result Value Ref Range    Uric Acid 5.4 3.4 - 7.0 mg/dL   [place] Spotted Fever, IgM    Specimen: Blood   Result Value Ref Range    RMSF IgM 3.16 (H) 0.00 - 0.89 index   Sedimentation Rate    Specimen: Blood   Result Value Ref Range    Sed Rate 34 (H) 0 - 20 mm/hr   C-reactive Protein    Specimen: Blood   Result Value Ref Range    C-Reactive Protein  <0.30 0.00 - 0.50 mg/dL   Rheumatoid Factor    Specimen: Blood   Result Value Ref Range    RA Latex Turbid <10.0 <14.0 IU/mL   Cyclic Citrul Peptide Antibody, IgG / IgA    Specimen: Blood   Result Value Ref Range    CCP Antibodies IgG/IgA 40 (H) 0 - 19 units   CBC & Differential    Specimen: Blood   Result Value Ref Range    WBC 7.95 3.40 - 10.80 10*3/mm3    RBC 4.82 4.14 - 5.80 10*6/mm3    Hemoglobin 14.6 13.0 - 17.7 g/dL    Hematocrit 42.7 37.5 - 51.0 %    MCV 88.6 79.0 - 97.0 fL    MCH 30.3 26.6 - 33.0 pg    MCHC 34.2 31.5 - 35.7 g/dL    RDW 14.1 12.3 - 15.4 %    Platelets 140 140 - 450 10*3/mm3    Neutrophil Rel % 46.3 42.7 - 76.0 %    Lymphocyte Rel % 38.5 19.6 - 45.3 %    Monocyte Rel % 9.7 5.0 - 12.0 %    Eosinophil Rel % 4.4 0.3 - 6.2 %    Basophil Rel % 0.8 0.0 - 1.5 %    Neutrophils Absolute 3.69 1.70 - 7.00 10*3/mm3    Lymphocytes Absolute 3.06 0.70 - 3.10 10*3/mm3    Monocytes Absolute 0.77 0.10 - 0.90 10*3/mm3    Eosinophils Absolute 0.35 0.00 - 0.40 10*3/mm3    Basophils Absolute 0.06 0.00 - 0.20 10*3/mm3    Immature Granulocyte Rel % 0.3 0.0 - 0.5 %    Immature Grans Absolute 0.02 0.00 - 0.05 10*3/mm3         Assessment & Plan     On osteo bi flex. Not much help.    Diagnoses and all orders for this visit:    1. Statin myopathy (Primary)  -     [place] Spotted Fever, IgM  -     Celiac Comprehensive Panel  -     Comprehensive Metabolic Panel  -     Vitamin B12  -     CBC & Differential  -     Myoglobin, Serum    2. 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 Arthritis  -     [place] Spotted Fever, IgM  -     Celiac Comprehensive Panel  -     Comprehensive Metabolic Panel  -     Vitamin B12  -     CBC & Differential  -     Myoglobin, Serum    4. Hyperuricemia  -     Uric acid  -     Comprehensive Metabolic  Panel  -     Vitamin B12  -     CBC & Differential  -     Myoglobin, Serum    Other orders  -     atorvastatin (LIPITOR) 10 MG tablet; Take 2 tablets by mouth Daily.  Dispense: 90 tablet; Refill: 3      Return in about 3 months (around 2/27/2024).          There are no Patient Instructions on file for this visit.     Frank Torrez MD    Assessment & Plan

## 2023-12-04 ENCOUNTER — OFFICE VISIT (OUTPATIENT)
Dept: CARDIOLOGY | Facility: CLINIC | Age: 83
End: 2023-12-04
Payer: MEDICARE

## 2023-12-04 VITALS
DIASTOLIC BLOOD PRESSURE: 72 MMHG | RESPIRATION RATE: 18 BRPM | SYSTOLIC BLOOD PRESSURE: 120 MMHG | OXYGEN SATURATION: 98 % | WEIGHT: 223 LBS | BODY MASS INDEX: 35.84 KG/M2 | HEART RATE: 65 BPM | HEIGHT: 66 IN

## 2023-12-04 DIAGNOSIS — E78.5 DYSLIPIDEMIA: ICD-10-CM

## 2023-12-04 DIAGNOSIS — I48.21 PERMANENT ATRIAL FIBRILLATION: ICD-10-CM

## 2023-12-04 DIAGNOSIS — I25.10 TRIPLE VESSEL CORONARY ARTERY DISEASE: ICD-10-CM

## 2023-12-04 DIAGNOSIS — I10 ESSENTIAL HYPERTENSION: ICD-10-CM

## 2023-12-04 DIAGNOSIS — R01.1 HEART MURMUR: Primary | ICD-10-CM

## 2023-12-04 DIAGNOSIS — N18.2 CKD (CHRONIC KIDNEY DISEASE) STAGE 2, GFR 60-89 ML/MIN: ICD-10-CM

## 2023-12-04 PROCEDURE — 3074F SYST BP LT 130 MM HG: CPT | Performed by: INTERNAL MEDICINE

## 2023-12-04 PROCEDURE — 3078F DIAST BP <80 MM HG: CPT | Performed by: INTERNAL MEDICINE

## 2023-12-04 PROCEDURE — 99214 OFFICE O/P EST MOD 30 MIN: CPT | Performed by: INTERNAL MEDICINE

## 2023-12-04 NOTE — PROGRESS NOTES
[facility] Cardiology Office Follow Up Note    Gabriel Conley Jr.  [id number]  2023    Primary Care Provider: Frank Torrez MD    Chief Complaint: Routine follow-up    History of Present Illness:   Mr. Gabriel Conley Jr. is a 83y.o. male who presents to the Cardiology Clinic for regular follow-up.  Mr. Conley has a past medical history which is significant for hypertension, dyslipidemia, stage II chronic kidney disease, and GERD his past medical history is also significant for peripheral vascular disease, with prior left subclavian artery stenting in .  He has a past cardiac history which is significant for suspected permanent atrial fibrillation and multivessel coronary artery disease reportedly diagnosed on coronary angiography in  in [place].  At that time, he subsequently underwent a four-vessel CABG. today, the patient reports he is doing well from a cardiac standpoint.  He denies any recent episodes of exertional chest pain or anginal symptoms.  He has not had any recent episodes of palpitations.  He continues to tolerate Eliquis without significant bleeding or bruising.  No other new complaints today.     Past Cardiac Testin. Last Coronary Angio:                1. In [place], revealed multivessel disease  2. Prior Stress Testin. MPS 2008: Reportedly unremarkable with normal perfusion, LVEF 65%  3. Last Echo: 2019              1.  Normal LV systolic function, LVEF 55-60%              2.  Normal left ventricular diastolic filling pattern.              3.  Normal right ventricular size and systolic function.              4.  Mild left atrial dilation.              5.  Trace mitral regurgitation.              6.  Trace tricuspid regurgitation.  4. Prior Holter Monitor: None       Review of Systems:   Review of Systems   Constitutional:  Negative for activity change, appetite change, chills, diaphoresis, fatigue, fever, unexpected weight  gain and unexpected weight loss.   Eyes:  Positive for visual disturbance. Negative for blurred vision and double vision.   Respiratory:  Negative for cough, chest tightness, shortness of breath and wheezing.    Cardiovascular:  Negative for chest pain, palpitations and leg swelling.   Gastrointestinal:  Negative for abdominal pain, anal bleeding, blood in stool and GERD.   Endocrine: Negative for cold intolerance and heat intolerance.   Genitourinary:  Negative for hematuria.   Neurological:  Negative for dizziness, syncope, weakness and light-headedness.   Hematological:  Does not bruise/bleed easily.   Psychiatric/Behavioral:  Negative for depressed mood and stress. The patient is not nervous/anxious.        I have reviewed and/or updated the patient's past medical, past surgical, family, social history, problem list and allergies as appropriate.     Medications:     Current Outpatient Medications:     atorvastatin (LIPITOR) 10 MG tablet, Take 2 tablets by mouth Daily. (Patient taking differently: Take 1 tablet by mouth Daily. Pt taking one half tablet daily), Disp: 90 tablet, Rfl: 3    cholecalciferol (VITAMIN D3) 1000 units tablet, Take 1 tablet by mouth Daily., Disp: , Rfl:     Coenzyme Q10 100 MG tablet, Take 2 tablets by mouth Daily., Disp: 90 tablet, Rfl: 5    Diclofenac Sodium (VOLTAREN) 1 % gel gel, APPLY 4 G TOPICALLY TO THE APPROPRIATE AREA AS DIRECTED 4 (FOUR) TIMES A DAY AS NEEDED (PAIN)., Disp: 100 g, Rfl: 1    Eliquis 5 MG tablet tablet, TAKE 1 TABLET BY MOUTH TWICE A DAY, Disp: 180 tablet, Rfl: 0    febuxostat (ULORIC) 40 MG tablet, TAKE 1 TABLET BY MOUTH EVERY DAY, Disp: 90 tablet, Rfl: 3    fluocinonide (LIDEX) 0.05 % external solution, APPLY TO AFFECTED AREA AT BEDTIME FOR 2 WEEKS THEN OFF 2 WEEKS REPEAT AS NEEDED, Disp: , Rfl:     furosemide (LASIX) 20 MG tablet, Take 1 tablet by mouth 3 (Three) Times a Week., Disp: 30 tablet, Rfl: 11    ketoconazole (NIZORAL) 2 % shampoo, 1 APPLICATION EVERY  "OTHER DAY TOPICALLY LET THE SHAMPOO SOAK IN FOR 5 MIN AND RINSE OFF WELL., Disp: , Rfl:     KLOR-CON 20 MEQ CR tablet, TAKE 1 TABLET BY MOUTH EVERY DAY, Disp: 90 tablet, Rfl: 3    melatonin 5 MG tablet tablet, Take 1 tablet by mouth Every Night., Disp: , Rfl:     metoprolol tartrate (LOPRESSOR) 50 MG tablet, TAKE 1 TABLET BY MOUTH TWICE A DAY, Disp: 180 tablet, Rfl: 3    Misc Natural Products (Osteo Bi-Flex Adv Double St) tablet, Take 2 tablets by mouth 2 (Two) Times a Day., Disp: 180 each, Rfl: 3    nitroglycerin (NITROSTAT) 0.4 MG SL tablet, Place 1 tablet under the tongue Every 5 (Five) Minutes As Needed., Disp: , Rfl:     omeprazole (priLOSEC) 20 MG capsule, TAKE 1 CAPSULE BY MOUTH EVERY DAY, Disp: 90 capsule, Rfl: 3    telmisartan-hydrochlorothiazide (MICARDIS HCT) 80-25 MG per tablet, TAKE 1 TABLET BY MOUTH EVERY DAY, Disp: 90 tablet, Rfl: 0    Physical Exam:  Vital Signs:   Vitals:    12/04/23 1331   BP: 120/72   BP Location: Left arm   Patient Position: Sitting   Pulse: 65   Resp: 18   SpO2: 98%   Weight: 101 kg (223 lb)   Height: 166.4 cm (65.5\")       Physical Exam  Constitutional:       General: He is not in acute distress.     Appearance: Normal appearance. He is not diaphoretic.   HENT:      Head: Normocephalic and atraumatic.   Cardiovascular:      Rate and Rhythm: Normal rate. Rhythm irregular.      Heart sounds: Murmur heard.   Pulmonary:      Effort: Pulmonary effort is normal. No respiratory distress.      Breath sounds: Normal breath sounds. No stridor. No wheezing, rhonchi or rales.   Abdominal:      General: Bowel sounds are normal. There is no distension.      Palpations: Abdomen is soft.      Tenderness: There is no abdominal tenderness. There is no guarding or rebound.   Musculoskeletal:         General: No swelling. Normal range of motion.      Cervical back: Neck supple. No tenderness.   Skin:     General: Skin is warm and dry.   Neurological:      General: No focal deficit present.      " Mental Status: He is alert and oriented to person, place, and time.   Psychiatric:         Mood and Affect: Mood normal.         Behavior: Behavior normal.         Results Review:   I reviewed the patient's new clinical results.        Assessment / Plan:     1.  Multivessel coronary artery disease  -- Status post four-vessel CABG in 2003  -- No chest pain or exertional anginal symptoms  -- Continue statin (myalgia with higher intensity statin)  -- No longer on aspirin given anticoagulated with Eliquis  --Follow-up in 1 year, sooner if required     2.  Permanent atrial fibrillation  -- Remains asymptomatic, no symptoms to suggest RVR  --Continue metoprolol for rate control  --Continue Eliquis for CVA prophylaxis     3.  Essential hypertension  --BP remains adequately controlled     4.  Dyslipidemia  -- Lipid profile in 7/23 showed LDL 91, HDL 36, triglycerides 394  --Continue statin  --Consider adding fibrate if triglycerides continue to be upward trending     5.  Stage II CKD  -- Creatinine 1.29 at baseline in 7/23    6.  Cardiac murmur  -- Repeat echocardiogram for surveillance    Follow Up:   Return in about 1 year (around 12/4/2024).      Thank you for allowing me to participate in the care of your patient. Please to not hesitate to contact me with additional questions or concerns.     GIL Savage MD  Interventional Cardiology   12/04/2023  13:26 EST

## 2024-02-12 DIAGNOSIS — I10 ESSENTIAL HYPERTENSION: ICD-10-CM

## 2024-02-12 RX ORDER — TELMISARTAN AND HYDROCHLORTHIAZIDE 80; 25 MG/1; MG/1
TABLET ORAL
Qty: 90 TABLET | Refills: 2 | Status: SHIPPED | OUTPATIENT
Start: 2024-02-12

## 2024-02-12 RX ORDER — ATORVASTATIN CALCIUM 20 MG/1
TABLET, FILM COATED ORAL
Qty: 90 TABLET | Refills: 0 | OUTPATIENT
Start: 2024-02-12

## 2024-02-26 DIAGNOSIS — I48.21 PERMANENT ATRIAL FIBRILLATION: ICD-10-CM

## 2024-02-26 RX ORDER — APIXABAN 5 MG/1
TABLET, FILM COATED ORAL
Qty: 180 TABLET | Refills: 1 | Status: SHIPPED | OUTPATIENT
Start: 2024-02-26

## 2024-03-14 ENCOUNTER — OFFICE VISIT (OUTPATIENT)
Dept: INTERNAL MEDICINE | Facility: CLINIC | Age: 84
End: 2024-03-14
Payer: MEDICARE

## 2024-03-14 ENCOUNTER — HOSPITAL ENCOUNTER (OUTPATIENT)
Dept: GENERAL RADIOLOGY | Facility: HOSPITAL | Age: 84
Discharge: HOME | End: 2024-03-14
Admitting: INTERNAL MEDICINE
Payer: MEDICARE

## 2024-03-14 VITALS
HEART RATE: 69 BPM | SYSTOLIC BLOOD PRESSURE: 138 MMHG | DIASTOLIC BLOOD PRESSURE: 68 MMHG | TEMPERATURE: 97 F | RESPIRATION RATE: 16 BRPM | WEIGHT: 226 LBS | OXYGEN SATURATION: 96 % | HEIGHT: 66 IN | BODY MASS INDEX: 36.32 KG/M2

## 2024-03-14 DIAGNOSIS — E11.00 TYPE 2 DIABETES MELLITUS WITH HYPEROSMOLARITY WITHOUT COMA, WITHOUT LONG-TERM CURRENT USE OF INSULIN: Primary | ICD-10-CM

## 2024-03-14 DIAGNOSIS — E78.2 MIXED HYPERLIPIDEMIA: ICD-10-CM

## 2024-03-14 DIAGNOSIS — G89.29 CHRONIC PAIN OF LEFT ANKLE: ICD-10-CM

## 2024-03-14 DIAGNOSIS — M25.372 ANKLE INSTABILITY, LEFT: ICD-10-CM

## 2024-03-14 DIAGNOSIS — E55.9 VITAMIN D DEFICIENCY: ICD-10-CM

## 2024-03-14 DIAGNOSIS — R22.2 CHEST WALL MASS: ICD-10-CM

## 2024-03-14 DIAGNOSIS — M25.572 CHRONIC PAIN OF LEFT ANKLE: ICD-10-CM

## 2024-03-14 DIAGNOSIS — I48.21 PERMANENT ATRIAL FIBRILLATION: ICD-10-CM

## 2024-03-14 LAB
EXPIRATION DATE: ABNORMAL
HBA1C MFR BLD: 5.9 % (ref 4.5–5.7)
Lab: ABNORMAL

## 2024-03-14 PROCEDURE — 73610 X-RAY EXAM OF ANKLE: CPT

## 2024-03-14 NOTE — PROGRESS NOTES
Subjective     Patient ID: Gabriel Conley Jr. is a 83 y.o. male. Patient is here for management of multiple medical problems.     Chief Complaint   Patient presents with    Statin Myopathy    Fall     History of Present Illness   Statin myopathy.,      Recent fall. X2. 2 weeks ago. Got caught on root of tree. .       The following portions of the patient's history were reviewed and updated as appropriate: allergies, current medications, past family history, past medical history, past social history, past surgical history and problem list.    Review of Systems    Current Outpatient Medications:     atorvastatin (LIPITOR) 10 MG tablet, Take 2 tablets by mouth Daily. (Patient taking differently: Take 1 tablet by mouth Daily. Pt taking one half tablet daily), Disp: 90 tablet, Rfl: 3    cholecalciferol (VITAMIN D3) 1000 units tablet, Take 1 tablet by mouth Daily., Disp: , Rfl:     Coenzyme Q10 100 MG tablet, Take 2 tablets by mouth Daily., Disp: 90 tablet, Rfl: 5    Diclofenac Sodium (VOLTAREN) 1 % gel gel, APPLY 4 G TOPICALLY TO THE APPROPRIATE AREA AS DIRECTED 4 (FOUR) TIMES A DAY AS NEEDED (PAIN)., Disp: 100 g, Rfl: 1    Eliquis 5 MG tablet tablet, TAKE 1 TABLET BY MOUTH TWICE A DAY, Disp: 180 tablet, Rfl: 1    febuxostat (ULORIC) 40 MG tablet, TAKE 1 TABLET BY MOUTH EVERY DAY, Disp: 90 tablet, Rfl: 3    fluocinonide (LIDEX) 0.05 % external solution, APPLY TO AFFECTED AREA AT BEDTIME FOR 2 WEEKS THEN OFF 2 WEEKS REPEAT AS NEEDED, Disp: , Rfl:     furosemide (LASIX) 20 MG tablet, Take 1 tablet by mouth 3 (Three) Times a Week., Disp: 30 tablet, Rfl: 11    ketoconazole (NIZORAL) 2 % shampoo, 1 APPLICATION EVERY OTHER DAY TOPICALLY LET THE SHAMPOO SOAK IN FOR 5 MIN AND RINSE OFF WELL., Disp: , Rfl:     KLOR-CON 20 MEQ CR tablet, TAKE 1 TABLET BY MOUTH EVERY DAY, Disp: 90 tablet, Rfl: 3    melatonin 5 MG tablet tablet, Take 1 tablet by mouth Every Night., Disp: , Rfl:     metoprolol tartrate (LOPRESSOR) 50 MG tablet, TAKE 1  "TABLET BY MOUTH TWICE A DAY, Disp: 180 tablet, Rfl: 3    Misc Natural Products (Osteo Bi-Flex Adv Double St) tablet, Take 2 tablets by mouth 2 (Two) Times a Day., Disp: 180 each, Rfl: 3    nitroglycerin (NITROSTAT) 0.4 MG SL tablet, Place 1 tablet under the tongue Every 5 (Five) Minutes As Needed., Disp: , Rfl:     omeprazole (priLOSEC) 20 MG capsule, TAKE 1 CAPSULE BY MOUTH EVERY DAY, Disp: 90 capsule, Rfl: 3    telmisartan-hydrochlorothiazide (MICARDIS HCT) 80-25 MG per tablet, TAKE 1 TABLET BY MOUTH EVERY DAY, Disp: 90 tablet, Rfl: 2    Objective      Blood pressure 138/68, pulse 69, temperature 97 °F (36.1 °C), resp. rate 16, height 166.4 cm (65.5\"), weight 103 kg (226 lb), SpO2 96%.            Physical Exam     General Appearance:    Alert, cooperative, no distress, appears stated age   Head:    Normocephalic, without obvious abnormality, atraumatic   Eyes:    PERRL, conjunctiva/corneas clear, EOM's intact   Ears:    Normal TM's and external ear canals, both ears   Nose:   Nares normal, septum midline, mucosa normal, no drainage   or sinus tenderness   Throat:   Lips, mucosa, and tongue normal; teeth and gums normal   Neck:   Supple, symmetrical, trachea midline, no adenopathy;        thyroid:  No enlargement/tenderness/nodules; no carotid    bruit or JVD   Back:     Symmetric, no curvature, ROM normal, no CVA tenderness   Lungs:     Clear to auscultation bilaterally, respirations unlabored   Chest wall:    No tenderness or deformity   Heart:    Regular rate and rhythm, S1 and S2 normal, no murmur,        rub or gallop   Abdomen:     Soft, non-tender, bowel sounds active all four quadrants,     no masses, no organomegaly   Extremities:   Extremities normal, atraumatic, no cyanosis or edema   Pulses:   2+ and symmetric all extremities   Skin:   Skin color, texture, turgor normal, no rashes or lesions   Lymph nodes:   Cervical, supraclavicular, and axillary nodes normal   Neurologic:   CNII-XII intact. Normal " strength, sensation and reflexes       throughout      Results for orders placed or performed in visit on 03/14/24   POC Glycosylated Hemoglobin (Hb A1C)    Specimen: Blood   Result Value Ref Range    Hemoglobin A1C 5.9 (A) 4.5 - 5.7 %    Lot Number [id number]     Expiration Date 07/30/2025          Assessment & Plan     Labs from nov. Not done.  Ha1c today with good control.         Stop ensure. Likely contributing to leg swelling.   Diagnoses and all orders for this visit:    1. Type 2 diabetes mellitus with hyperosmolarity without coma, without long-term current use of insulin (Primary)  -     POC Glycosylated Hemoglobin (Hb A1C)    2. Permanent atrial fibrillation    3. Vitamin D deficiency    4. Mixed hyperlipidemia    5. BMI 36.0-36.9,adult    6. Chest wall mass  -     Ambulatory Referral to General Surgery  -     Ambulatory Referral to Podiatry    7. Chronic pain of left ankle  -     XR Ankle 3+ View Left; Future  -     Ambulatory Referral to Podiatry    8. Ankle instability, left      Return in about 6 months (around 9/14/2024).          There are no Patient Instructions on file for this visit.     Frank Torrez MD    Assessment & Plan

## 2024-03-18 RX ORDER — POTASSIUM CHLORIDE 1500 MG/1
TABLET, EXTENDED RELEASE ORAL
Qty: 90 TABLET | Refills: 3 | Status: SHIPPED | OUTPATIENT
Start: 2024-03-18

## 2024-03-26 NOTE — PROGRESS NOTES
Patient: Gabriel Conley Jr.    YOB: 1940    Date: 04/01/2024    Primary Care Provider: Frank Torrez MD    Chief Complaint   Patient presents with    Mass     Chest wall       SUBJECTIVE:    History of present illness:  I saw the patient in the office today for the evaluation and treatment for a chest wall mass.  Patient concerned about having discomfort on chest wall and right arm.    The following portions of the patient's history were reviewed and updated as appropriate: allergies, current medications, past family history, past medical history, past social history, past surgical history and problem list.      Review of Systems   Constitutional:  Negative for chills, fever and unexpected weight change.   HENT:  Negative for trouble swallowing and voice change.    Eyes:  Negative for visual disturbance.   Respiratory:  Negative for apnea, cough, chest tightness, shortness of breath and wheezing.    Cardiovascular:  Negative for chest pain, palpitations and leg swelling.   Gastrointestinal:  Negative for abdominal distention, abdominal pain, anal bleeding, blood in stool, constipation, diarrhea, nausea, rectal pain and vomiting.   Endocrine: Negative for cold intolerance and heat intolerance.   Genitourinary:  Negative for difficulty urinating, dysuria, flank pain, scrotal swelling and testicular pain.   Musculoskeletal:  Negative for back pain, gait problem and joint swelling.   Skin:  Negative for color change, rash and wound.   Neurological:  Negative for dizziness, syncope, speech difficulty, weakness, numbness and headaches.   Hematological:  Negative for adenopathy. Does not bruise/bleed easily.   Psychiatric/Behavioral:  Negative for confusion. The patient is not nervous/anxious.        Allergies:  Allergies   Allergen Reactions    Hydrocodone Delirium     NAUSEA AND VOMITING      Xarelto [Rivaroxaban] Other (See Comments)     Bleeding gums       Medications:    Current Outpatient  Medications:     atorvastatin (LIPITOR) 10 MG tablet, Take 2 tablets by mouth Daily. (Patient taking differently: Take 1 tablet by mouth Daily. Pt taking one half tablet daily), Disp: 90 tablet, Rfl: 3    cholecalciferol (VITAMIN D3) 1000 units tablet, Take 1 tablet by mouth Daily., Disp: , Rfl:     Coenzyme Q10 100 MG tablet, Take 2 tablets by mouth Daily., Disp: 90 tablet, Rfl: 5    Diclofenac Sodium (VOLTAREN) 1 % gel gel, APPLY 4 G TOPICALLY TO THE APPROPRIATE AREA AS DIRECTED 4 (FOUR) TIMES A DAY AS NEEDED (PAIN)., Disp: 100 g, Rfl: 1    Eliquis 5 MG tablet tablet, TAKE 1 TABLET BY MOUTH TWICE A DAY, Disp: 180 tablet, Rfl: 1    febuxostat (ULORIC) 40 MG tablet, TAKE 1 TABLET BY MOUTH EVERY DAY, Disp: 90 tablet, Rfl: 3    furosemide (LASIX) 20 MG tablet, Take 1 tablet by mouth 3 (Three) Times a Week., Disp: 30 tablet, Rfl: 11    KLOR-CON 20 MEQ CR tablet, TAKE 1 TABLET BY MOUTH EVERY DAY, Disp: 90 tablet, Rfl: 3    metoprolol tartrate (LOPRESSOR) 50 MG tablet, TAKE 1 TABLET BY MOUTH TWICE A DAY, Disp: 180 tablet, Rfl: 3    Misc Natural Products (Osteo Bi-Flex Adv Double St) tablet, Take 2 tablets by mouth 2 (Two) Times a Day., Disp: 180 each, Rfl: 3    nitroglycerin (NITROSTAT) 0.4 MG SL tablet, Place 1 tablet under the tongue Every 5 (Five) Minutes As Needed., Disp: , Rfl:     omeprazole (priLOSEC) 20 MG capsule, TAKE 1 CAPSULE BY MOUTH EVERY DAY, Disp: 90 capsule, Rfl: 3    telmisartan-hydrochlorothiazide (MICARDIS HCT) 80-25 MG per tablet, TAKE 1 TABLET BY MOUTH EVERY DAY, Disp: 90 tablet, Rfl: 2    fluocinonide (LIDEX) 0.05 % external solution, APPLY TO AFFECTED AREA AT BEDTIME FOR 2 WEEKS THEN OFF 2 WEEKS REPEAT AS NEEDED (Patient not taking: Reported on 4/1/2024), Disp: , Rfl:     ketoconazole (NIZORAL) 2 % shampoo, 1 APPLICATION EVERY OTHER DAY TOPICALLY LET THE SHAMPOO SOAK IN FOR 5 MIN AND RINSE OFF WELL. (Patient not taking: Reported on 4/1/2024), Disp: , Rfl:     melatonin 5 MG tablet tablet, Take 1  "tablet by mouth Every Night. (Patient not taking: Reported on 4/1/2024), Disp: , Rfl:     History:  Past Medical History:   Diagnosis Date    Acid reflux     Acid reflux     Arthritis     Atrial fibrillation     Cataract     BILATERAL    Coronary artery disease     Glaucoma     Heart disease     Heart murmur     History of nuclear stress test 2015    DR. AMBROSE-\"IT WAS OK\"    [place] (hard of hearing)     Kidney stone     Macular degeneration     Melena 9/9/2016    Sleep apnea     NO CPAP    Wears glasses        Past Surgical History:   Procedure Laterality Date    CARDIAC CATHETERIZATION      X2    CARDIAC SURGERY      CATARACT EXTRACTION Bilateral     COLONOSCOPY  2000    COLONOSCOPY N/A 3/13/2018    Procedure: COLONOSCOPY WITH POLYPECTOMY;  Surgeon: Ifeanyi Conley MD;  Location: [facility] ENDOSCOPY;  Service: Gastroenterology    CORONARY ARTERY BYPASS GRAFT  2002     Coronary Artery Triple Arterial Bypass Graft    CORONARY STENT PLACEMENT      X2    ENDOSCOPY      INGUINAL HERNIA REPAIR Left     ROOT CANAL  09/22/2019    ROOT CANAL  02/08/2020       Family History   Problem Relation Age of Onset    Stroke Other     Hypertension Other     Diabetes Other     Arthritis Other     Heart attack Other     Hypertension Mother     Cancer Mother     Arthritis Mother     Heart attack Mother     Stroke Father        Social History     Tobacco Use    Smoking status: Never    Smokeless tobacco: Never   Vaping Use    Vaping status: Never Used   Substance Use Topics    Alcohol use: No    Drug use: No        OBJECTIVE:    Vital Signs:   Vitals:    04/01/24 1246   BP: 126/62   Pulse: 90   Temp: 98.2 °F (36.8 °C)   SpO2: 95%   Weight: 103 kg (226 lb)   Height: 166.4 cm (65.51\")       Physical Exam:   General Appearance:    Alert, cooperative, in no acute distress   Head:    Normocephalic, without obvious abnormality, atraumatic   Eyes:            Lids and lashes normal, conjunctivae and sclerae normal, no   icterus, no pallor, " corneas clear, PERRLA   Ears:    Ears appear intact with no abnormalities noted   Throat:   No oral lesions, no thrush, oral mucosa moist   Neck:   No adenopathy, supple, trachea midline, no thyromegaly, no   carotid bruit, no JVD   Lungs:     Clear to auscultation,respirations regular, even and                  unlabored    Heart:    Regular rhythm and normal rate, normal S1 and S2, no            murmur, no gallop, no rub, no click   Chest Wall:    No abnormalities observed.  Questionable fullness left chest wall.  Unable to palpate a definite lipoma.   Abdomen:     Normal bowel sounds, no masses, no organomegaly, soft        non-tender, non-distended, no guarding, no rebound                tenderness   Extremities:   Moves all extremities well, no edema, no cyanosis, no             redness   Pulses:   Pulses palpable and equal bilaterally   Skin:   No bleeding, bruising or rash   Lymph nodes:   No palpable adenopathy   Neurologic:   Cranial nerves 2 - 12 grossly intact, sensation intact, DTR       present and equal bilaterally     Results Review:   I reviewed the patient's new clinical results.    Review of Systems was reviewed and confirmed as accurate as documented by the MA.    ASSESSMENT/PLAN:    1. Chest wall mass    2. Lipoma of torso        Patient reassured, ultrasound does not identify a lipoma on the left chest wall.  Small lipoma in the right arm is insignificant.  Patient reassured and will follow-up with me as needed.    I discussed the patients findings and my recommendations with patient        Electronically signed by Marlee Wei MD  04/01/24

## 2024-04-01 ENCOUNTER — OFFICE VISIT (OUTPATIENT)
Dept: SURGERY | Facility: CLINIC | Age: 84
End: 2024-04-01
Payer: MEDICARE

## 2024-04-01 VITALS
TEMPERATURE: 98.2 F | BODY MASS INDEX: 36.32 KG/M2 | HEART RATE: 90 BPM | DIASTOLIC BLOOD PRESSURE: 62 MMHG | OXYGEN SATURATION: 95 % | HEIGHT: 66 IN | WEIGHT: 226 LBS | SYSTOLIC BLOOD PRESSURE: 126 MMHG

## 2024-04-01 DIAGNOSIS — R22.2 CHEST WALL MASS: Primary | ICD-10-CM

## 2024-04-01 DIAGNOSIS — D17.1 LIPOMA OF TORSO: ICD-10-CM

## 2024-04-01 PROCEDURE — 1159F MED LIST DOCD IN RCRD: CPT | Performed by: SURGERY

## 2024-04-01 PROCEDURE — 1160F RVW MEDS BY RX/DR IN RCRD: CPT | Performed by: SURGERY

## 2024-04-01 PROCEDURE — 3078F DIAST BP <80 MM HG: CPT | Performed by: SURGERY

## 2024-04-01 PROCEDURE — 99203 OFFICE O/P NEW LOW 30 MIN: CPT | Performed by: SURGERY

## 2024-04-01 PROCEDURE — 3074F SYST BP LT 130 MM HG: CPT | Performed by: SURGERY

## 2024-04-03 ENCOUNTER — PATIENT ROUNDING (BHMG ONLY) (OUTPATIENT)
Dept: SURGERY | Facility: CLINIC | Age: 84
End: 2024-04-03
Payer: MEDICARE

## 2024-04-03 NOTE — PROGRESS NOTES
April 3, 2024    Hello, may I speak with Gabriel Conley Jr.?    My name is ISAAC DEL TORO    I am  with [facility] GENERAL SURGERY  [address]  [place] [address] [phone].    Before we get started may I verify your date of birth? 1940    I am calling to officially welcome you to our practice and ask about your recent visit. Is this a good time to talk? yes    Tell me about your visit with us. What things went well?  EVERYTHING WAS GOOD, PERFECT, HAPPY.       We're always looking for ways to make our patients' experiences even better. Do you have recommendations on ways we may improve?  no    Overall were you satisfied with your first visit to our practice? yes       I appreciate you taking the time to speak with me today. Is there anything else I can do for you? no      Thank you, and have a great day.

## 2024-04-05 RX ORDER — ATORVASTATIN CALCIUM 10 MG/1
20 TABLET, FILM COATED ORAL DAILY
Qty: 180 TABLET | Refills: 3 | Status: SHIPPED | OUTPATIENT
Start: 2024-04-05

## 2024-08-31 DIAGNOSIS — I48.21 PERMANENT ATRIAL FIBRILLATION: ICD-10-CM

## 2024-08-31 DIAGNOSIS — I10 ESSENTIAL HYPERTENSION: ICD-10-CM

## 2024-09-03 RX ORDER — TELMISARTAN AND HYDROCHLORTHIAZIDE 80; 25 MG/1; MG/1
TABLET ORAL
Qty: 90 TABLET | Refills: 2 | Status: SHIPPED | OUTPATIENT
Start: 2024-09-03

## 2024-09-03 RX ORDER — APIXABAN 5 MG/1
TABLET, FILM COATED ORAL
Qty: 180 TABLET | Refills: 1 | Status: SHIPPED | OUTPATIENT
Start: 2024-09-03

## 2024-09-07 DIAGNOSIS — K27.9 PUD (PEPTIC ULCER DISEASE): ICD-10-CM

## 2024-09-07 DIAGNOSIS — K21.9 GASTROESOPHAGEAL REFLUX DISEASE WITHOUT ESOPHAGITIS: ICD-10-CM

## 2024-09-07 DIAGNOSIS — K44.9 HERNIA, HIATAL: ICD-10-CM

## 2024-11-27 DIAGNOSIS — I10 PRIMARY HYPERTENSION: ICD-10-CM

## 2024-11-27 RX ORDER — METOPROLOL TARTRATE 50 MG
TABLET ORAL
Qty: 180 TABLET | Refills: 3 | Status: SHIPPED | OUTPATIENT
Start: 2024-11-27

## 2024-12-05 ENCOUNTER — OFFICE VISIT (OUTPATIENT)
Dept: CARDIOLOGY | Facility: CLINIC | Age: 84
End: 2024-12-05
Payer: MEDICARE

## 2024-12-05 VITALS
BODY MASS INDEX: 37.12 KG/M2 | WEIGHT: 222.8 LBS | DIASTOLIC BLOOD PRESSURE: 70 MMHG | HEIGHT: 65 IN | HEART RATE: 84 BPM | SYSTOLIC BLOOD PRESSURE: 130 MMHG | OXYGEN SATURATION: 96 %

## 2024-12-05 DIAGNOSIS — I25.10 TRIPLE VESSEL CORONARY ARTERY DISEASE: ICD-10-CM

## 2024-12-05 DIAGNOSIS — I35.0 AORTIC STENOSIS, MILD: ICD-10-CM

## 2024-12-05 DIAGNOSIS — E78.5 DYSLIPIDEMIA: ICD-10-CM

## 2024-12-05 DIAGNOSIS — N18.2 CKD (CHRONIC KIDNEY DISEASE) STAGE 2, GFR 60-89 ML/MIN: ICD-10-CM

## 2024-12-05 DIAGNOSIS — I48.21 PERMANENT ATRIAL FIBRILLATION: Primary | ICD-10-CM

## 2024-12-05 DIAGNOSIS — I10 ESSENTIAL HYPERTENSION: ICD-10-CM

## 2024-12-05 NOTE — PROGRESS NOTES
[facility] Cardiology Office Follow Up Note    Gabriel Conley Jr.  [id number]  2024    Primary Care Provider: Frank Torrez MD    Chief Complaint: Routine follow-up    History of Present Illness:   Mr. Gabriel Conley Jr. is a 84y.o. male who presents to the Cardiology Clinic for regular follow-up.  Mr. Conley has a past medical history which is significant for hypertension, dyslipidemia, stage II chronic kidney disease, and GERD his past medical history is also significant for peripheral vascular disease, with prior left subclavian artery stenting in .  He has a past cardiac history which is significant for permanent atrial fibrillation and multivessel coronary artery disease reportedly diagnosed on coronary angiography in  in [place].  At that time, he subsequently underwent a four-vessel CABG. today, the patient reports he continues to do well from a cardiac standpoint.  He remains active, without chest pain or exertional chest discomfort.  His atrial fibrillation remains asymptomatic.  No recent episodes of palpitations.  He is tolerating Eliquis without significant bleeding or bruising.  No specific complaints.     Past Cardiac Testin. Last Coronary Angio:                1. In [place], revealed multivessel disease  2. Prior Stress Testin. MPS : Reportedly unremarkable with normal perfusion, LVEF 65%  3. Last Echo:            1.  Normal left ventricular size and systolic function, LVEF 55-60%.  2.  Mild concentric LVH.  3.  Indeterminate diastolic filling pattern.  4.  Normal right ventricular size and systolic function.  5.  Moderately increased left atrial volume index.  6.  Moderate calcification of the aortic valve with mild aortic stenosis.  7.  Trace to mild tricuspid regurgitation.  4. Prior Holter Monitor: None    Review of Systems:   Review of Systems   Constitutional:  Negative for activity change, appetite change, chills,  diaphoresis, fatigue, fever, unexpected weight gain and unexpected weight loss.   Eyes:  Negative for blurred vision and double vision.   Respiratory:  Negative for cough, chest tightness, shortness of breath and wheezing.    Cardiovascular:  Negative for chest pain, palpitations and leg swelling.   Gastrointestinal:  Negative for abdominal pain, anal bleeding, blood in stool and GERD.   Endocrine: Negative for cold intolerance and heat intolerance.   Genitourinary:  Negative for hematuria.   Neurological:  Negative for dizziness, syncope, weakness and light-headedness.   Hematological:  Does not bruise/bleed easily.   Psychiatric/Behavioral:  Negative for depressed mood and stress. The patient is not nervous/anxious.        I have reviewed and/or updated the patient's past medical, past surgical, family, social history, problem list and allergies as appropriate.     Medications:     Current Outpatient Medications:     atorvastatin (LIPITOR) 10 MG tablet, TAKE 2 TABLETS BY MOUTH EVERY DAY, Disp: 180 tablet, Rfl: 3    cholecalciferol (VITAMIN D3) 1000 units tablet, Take 1 tablet by mouth Daily., Disp: , Rfl:     Coenzyme Q10 100 MG tablet, Take 2 tablets by mouth Daily., Disp: 90 tablet, Rfl: 5    Diclofenac Sodium (VOLTAREN) 1 % gel gel, APPLY 4 G TOPICALLY TO THE APPROPRIATE AREA AS DIRECTED 4 (FOUR) TIMES A DAY AS NEEDED (PAIN)., Disp: 100 g, Rfl: 1    Eliquis 5 MG tablet tablet, TAKE 1 TABLET BY MOUTH TWICE A DAY, Disp: 180 tablet, Rfl: 1    febuxostat (ULORIC) 40 MG tablet, TAKE 1 TABLET BY MOUTH EVERY DAY, Disp: 90 tablet, Rfl: 3    furosemide (LASIX) 20 MG tablet, Take 1 tablet by mouth 3 (Three) Times a Week., Disp: 30 tablet, Rfl: 11    KLOR-CON 20 MEQ CR tablet, TAKE 1 TABLET BY MOUTH EVERY DAY, Disp: 90 tablet, Rfl: 3    metoprolol tartrate (LOPRESSOR) 50 MG tablet, TAKE 1 TABLET BY MOUTH TWICE A DAY, Disp: 180 tablet, Rfl: 3    Misc Natural Products (Osteo Bi-Flex Adv Double St) tablet, Take 2 tablets by  "mouth 2 (Two) Times a Day., Disp: 180 each, Rfl: 3    nitroglycerin (NITROSTAT) 0.4 MG SL tablet, Place 1 tablet under the tongue Every 5 (Five) Minutes As Needed., Disp: , Rfl:     omeprazole (priLOSEC) 20 MG capsule, TAKE 1 CAPSULE BY MOUTH EVERY DAY, Disp: 90 capsule, Rfl: 3    telmisartan-hydrochlorothiazide (MICARDIS HCT) 80-25 MG per tablet, TAKE 1 TABLET BY MOUTH EVERY DAY, Disp: 90 tablet, Rfl: 2    fluocinonide (LIDEX) 0.05 % external solution, APPLY TO AFFECTED AREA AT BEDTIME FOR 2 WEEKS THEN OFF 2 WEEKS REPEAT AS NEEDED (Patient not taking: Reported on 12/5/2024), Disp: , Rfl:     ketoconazole (NIZORAL) 2 % shampoo, 1 APPLICATION EVERY OTHER DAY TOPICALLY LET THE SHAMPOO SOAK IN FOR 5 MIN AND RINSE OFF WELL. (Patient not taking: Reported on 12/5/2024), Disp: , Rfl:     melatonin 5 MG tablet tablet, Take 1 tablet by mouth Every Night. (Patient not taking: Reported on 12/5/2024), Disp: , Rfl:     Physical Exam:  Vital Signs:   Vitals:    12/05/24 1304   BP: 130/70   BP Location: Right arm   Patient Position: Sitting   Cuff Size: Adult   Pulse: 84   SpO2: 96%   Weight: 101 kg (222 lb 12.8 oz)   Height: 165.1 cm (65\")       Physical Exam  Constitutional:       General: He is not in acute distress.     Appearance: He is obese. He is not diaphoretic.   HENT:      Head: Normocephalic and atraumatic.   Cardiovascular:      Rate and Rhythm: Normal rate and regular rhythm.      Heart sounds: No murmur heard.  Pulmonary:      Effort: Pulmonary effort is normal. No respiratory distress.      Breath sounds: Normal breath sounds. No stridor. No wheezing, rhonchi or rales.   Abdominal:      General: Bowel sounds are normal. There is no distension.      Palpations: Abdomen is soft.      Tenderness: There is no abdominal tenderness. There is no guarding or rebound.   Musculoskeletal:         General: No swelling. Normal range of motion.      Cervical back: Neck supple. No tenderness.   Skin:     General: Skin is warm and " dry.   Neurological:      General: No focal deficit present.      Mental Status: He is alert and oriented to person, place, and time.   Psychiatric:         Mood and Affect: Mood normal.         Behavior: Behavior normal.         Results Review:   I reviewed the patient's new clinical results.        Assessment / Plan:     1.  Multivessel coronary artery disease  -- Status post four-vessel CABG in 2003  -- Remains active without exertional anginal symptoms  -- Continue statin at current dose given history of myalgias with high intensity dosing  --Follow-up in 1 year, sooner if required     2.  Permanent atrial fibrillation  -- No palpitations, remains asymptomatic  --Continue metoprolol for rate control  --Continue Eliquis for CVA prophylaxis     3.  Essential hypertension  --BP adequately controlled     4.  Dyslipidemia  -- Lipid profile in 7/23 showed LDL 91, HDL 36, triglycerides 394  --Continue statin     5.  Stage II CKD  -- Creatinine 1.29 at baseline in 7/23     6.  Aortic stenosis  -- Mild on recent echocardiogram  -- Surveillance echocardiogram in 1-2 years        Follow Up:   Return in about 1 year (around 12/5/2025).      Thank you for allowing me to participate in the care of your patient. Please to not hesitate to contact me with additional questions or concerns.     GIL Savage MD  Interventional Cardiology   12/05/2024  12:59 EST

## 2024-12-23 RX ORDER — FEBUXOSTAT 40 MG/1
TABLET, FILM COATED ORAL
Qty: 90 TABLET | Refills: 3 | Status: SHIPPED | OUTPATIENT
Start: 2024-12-23

## 2024-12-30 DIAGNOSIS — E11.00 TYPE 2 DIABETES MELLITUS WITH HYPEROSMOLARITY WITHOUT COMA, WITHOUT LONG-TERM CURRENT USE OF INSULIN: Primary | ICD-10-CM

## 2024-12-30 DIAGNOSIS — E78.2 MIXED HYPERLIPIDEMIA: ICD-10-CM

## 2024-12-30 DIAGNOSIS — Z12.5 PROSTATE CANCER SCREENING: ICD-10-CM

## 2024-12-30 DIAGNOSIS — Z00.00 ROUTINE GENERAL MEDICAL EXAMINATION AT A HEALTH CARE FACILITY: ICD-10-CM

## 2024-12-30 DIAGNOSIS — I10 PRIMARY HYPERTENSION: ICD-10-CM

## 2024-12-30 DIAGNOSIS — E79.0 HYPERURICEMIA: ICD-10-CM

## 2025-01-24 ENCOUNTER — TELEPHONE (OUTPATIENT)
Dept: INTERNAL MEDICINE | Facility: CLINIC | Age: 85
End: 2025-01-24
Payer: MEDICARE

## 2025-01-24 NOTE — TELEPHONE ENCOUNTER
Caller: TRUJANNET    Relationship: Emergency Contact    Best call back number: [phone]     What form or medical record are you requesting: OFFICE NOTES SIGNED BY DR EVERETT    Who is requesting this form or medical record from you: [facility]    How would you like to receive the form or medical records (pick-up, mail, fax): FAX  If fax, what is the fax number: PHONE NUMBER -[phone] - ashley  If mail, what is the address:   If pick-up, provide patient with address and location details    Timeframe paperwork needed: AS SOON AS POSSIBLE    Additional notes:

## 2025-01-27 NOTE — TELEPHONE ENCOUNTER
[facility] CALLED TO FOLLOW UP ON THIS REQUEST.    THEY NEED CURRENT MEDICATION LIST SIGNED BY PROVIDER. ALSO NEEDS HEALTH AND PHYSICAL, VACCINES HE'S HAD AND SO ON. ALL INFORMATION WE HAVE ON HIM.    THEY WILL FAX THE FORM TO THE OFFICE OF WHAT IS NEEDED.    FAX: [phone]

## 2025-02-27 RX ORDER — POTASSIUM CHLORIDE 1500 MG/1
TABLET, EXTENDED RELEASE ORAL
Qty: 90 TABLET | Refills: 3 | Status: SHIPPED | OUTPATIENT
Start: 2025-02-27

## 2025-02-27 NOTE — TELEPHONE ENCOUNTER
Rx Refill Note  Requested Prescriptions     Pending Prescriptions Disp Refills    Klor-Con M20 20 MEQ CR tablet [Pharmacy Med Name: KLOR-CON M20 TABLET] 90 tablet 3     Sig: TAKE 1 TABLET BY MOUTH EVERY DAY      Last office visit with prescribing clinician: 3/14/2024   Last telemedicine visit with prescribing clinician: Visit date not found   Next office visit with prescribing clinician: Visit date not found                         Would you like a call back once the refill request has been completed: [] Yes [] No    If the office needs to give you a call back, can they leave a voicemail: [] Yes [] No    Laya Patel, [facility]  02/27/25, 07:09 EST

## 2025-03-01 DIAGNOSIS — I48.21 PERMANENT ATRIAL FIBRILLATION: ICD-10-CM

## 2025-03-03 RX ORDER — APIXABAN 5 MG/1
TABLET, FILM COATED ORAL
Qty: 180 TABLET | Refills: 1 | OUTPATIENT
Start: 2025-03-03

## 2025-03-18 DIAGNOSIS — I48.21 PERMANENT ATRIAL FIBRILLATION: ICD-10-CM

## 2025-03-18 RX ORDER — APIXABAN 5 MG/1
5 TABLET, FILM COATED ORAL 2 TIMES DAILY
Qty: 60 TABLET | Refills: 0 | Status: SHIPPED | OUTPATIENT
Start: 2025-03-18 | End: 2025-04-17

## 2025-03-18 NOTE — TELEPHONE ENCOUNTER
Patient needs CBC drawn per hospital protocol.  Order in, he can go by the lab as soon as able.  Courtesy 30 day refill given.    Electronically signed by MARY Faith, 03/18/25, 2:11 PM EDT.

## 2025-04-07 DIAGNOSIS — I48.21 PERMANENT ATRIAL FIBRILLATION: ICD-10-CM

## 2025-04-07 NOTE — TELEPHONE ENCOUNTER
Caller: YONI HOLLOWAY    Relationship: Provider    Best call back number: [phone], [phone]    Requested Prescriptions:   Requested Prescriptions     Pending Prescriptions Disp Refills    Eliquis 5 MG tablet tablet 60 tablet 0     Sig: Take 1 tablet by mouth 2 (Two) Times a Day for 30 days.        Pharmacy where request should be sent: [id number] - [place], [place] - [address]  - [phone]  - [phone] FX     Last office visit with prescribing clinician: 12/5/2024   Last telemedicine visit with prescribing clinician: Visit date not found   Next office visit with prescribing clinician: 12/2/2025     Additional details provided by patient: PT HAS HIS LABS DONE AND ARE IN HIS CHART FOR REFILLS     Does the patient have less than a 3 day supply:  [] Yes  [x] No    Would you like a call back once the refill request has been completed: [] Yes [x] No    If the office needs to give you a call back, can they leave a voicemail: [] Yes [x] No    Blanche Ashley Rep   04/07/25 10:04 EDT

## 2025-04-14 RX ORDER — APIXABAN 5 MG/1
5 TABLET, FILM COATED ORAL 2 TIMES DAILY
Qty: 180 TABLET | Refills: 3 | Status: SHIPPED | OUTPATIENT
Start: 2025-04-14 | End: 2026-04-09

## (undated) DEVICE — ENDOGATOR AUXILIARY WATER JET CONNECTOR: Brand: ENDOGATOR

## (undated) DEVICE — SNAR POLYP SENSATION STDOVL 27 240 BX40

## (undated) DEVICE — Device

## (undated) DEVICE — FRCP BIOP COLD ENDOJAW ALLGTR W/NDL 2.8X2300MM BLU